# Patient Record
Sex: MALE | Race: WHITE | NOT HISPANIC OR LATINO
[De-identification: names, ages, dates, MRNs, and addresses within clinical notes are randomized per-mention and may not be internally consistent; named-entity substitution may affect disease eponyms.]

---

## 2017-02-13 ENCOUNTER — RX RENEWAL (OUTPATIENT)
Age: 67
End: 2017-02-13

## 2017-04-20 ENCOUNTER — APPOINTMENT (OUTPATIENT)
Dept: DERMATOLOGY | Facility: CLINIC | Age: 67
End: 2017-04-20

## 2017-07-11 ENCOUNTER — APPOINTMENT (OUTPATIENT)
Dept: INTERNAL MEDICINE | Facility: CLINIC | Age: 67
End: 2017-07-11

## 2017-07-11 ENCOUNTER — NON-APPOINTMENT (OUTPATIENT)
Age: 67
End: 2017-07-11

## 2017-07-11 VITALS
HEIGHT: 70 IN | BODY MASS INDEX: 22.9 KG/M2 | WEIGHT: 160 LBS | SYSTOLIC BLOOD PRESSURE: 108 MMHG | DIASTOLIC BLOOD PRESSURE: 60 MMHG | HEART RATE: 68 BPM | RESPIRATION RATE: 12 BRPM

## 2017-07-11 DIAGNOSIS — Z11.59 ENCOUNTER FOR SCREENING FOR OTHER VIRAL DISEASES: ICD-10-CM

## 2017-07-11 DIAGNOSIS — Z92.29 PERSONAL HISTORY OF OTHER DRUG THERAPY: ICD-10-CM

## 2017-07-13 LAB
ALBUMIN SERPL ELPH-MCNC: 4.3 G/DL
ALP BLD-CCNC: 61 U/L
ALT SERPL-CCNC: 20 U/L
ANION GAP SERPL CALC-SCNC: 16 MMOL/L
APPEARANCE: CLEAR
AST SERPL-CCNC: 21 U/L
BACTERIA: NEGATIVE
BASOPHILS # BLD AUTO: 0.02 K/UL
BASOPHILS NFR BLD AUTO: 0.4 %
BILIRUB SERPL-MCNC: 1 MG/DL
BILIRUBIN URINE: NEGATIVE
BLOOD URINE: NEGATIVE
BUN SERPL-MCNC: 25 MG/DL
CALCIUM SERPL-MCNC: 9.4 MG/DL
CHLORIDE SERPL-SCNC: 102 MMOL/L
CHOLEST SERPL-MCNC: 202 MG/DL
CHOLEST/HDLC SERPL: 2.3 RATIO
CO2 SERPL-SCNC: 24 MMOL/L
COLOR: YELLOW
CREAT SERPL-MCNC: 1.48 MG/DL
EOSINOPHIL # BLD AUTO: 0.08 K/UL
EOSINOPHIL NFR BLD AUTO: 1.4 %
GLUCOSE QUALITATIVE U: NORMAL MG/DL
GLUCOSE SERPL-MCNC: 103 MG/DL
HCT VFR BLD CALC: 41.4 %
HDLC SERPL-MCNC: 86 MG/DL
HGB BLD-MCNC: 14.2 G/DL
HYALINE CASTS: 1 /LPF
IMM GRANULOCYTES NFR BLD AUTO: 0.2 %
KETONES URINE: NEGATIVE
LDLC SERPL CALC-MCNC: 102 MG/DL
LEUKOCYTE ESTERASE URINE: NEGATIVE
LYMPHOCYTES # BLD AUTO: 1.29 K/UL
LYMPHOCYTES NFR BLD AUTO: 23.3 %
MAN DIFF?: NORMAL
MCHC RBC-ENTMCNC: 32.2 PG
MCHC RBC-ENTMCNC: 34.3 GM/DL
MCV RBC AUTO: 93.9 FL
MICROSCOPIC-UA: NORMAL
MONOCYTES # BLD AUTO: 0.6 K/UL
MONOCYTES NFR BLD AUTO: 10.8 %
NEUTROPHILS # BLD AUTO: 3.54 K/UL
NEUTROPHILS NFR BLD AUTO: 63.9 %
NITRITE URINE: NEGATIVE
PH URINE: 5
PLATELET # BLD AUTO: 281 K/UL
POTASSIUM SERPL-SCNC: 4.9 MMOL/L
PROT SERPL-MCNC: 6.6 G/DL
PROTEIN URINE: NEGATIVE MG/DL
RBC # BLD: 4.41 M/UL
RBC # FLD: 13 %
RED BLOOD CELLS URINE: 1 /HPF
RPR SER-TITR: NORMAL
SODIUM SERPL-SCNC: 142 MMOL/L
SPECIFIC GRAVITY URINE: 1.02
SQUAMOUS EPITHELIAL CELLS: 0 /HPF
TRIGL SERPL-MCNC: 69 MG/DL
TSH SERPL-ACNC: 1.29 UIU/ML
UROBILINOGEN URINE: NORMAL MG/DL
VIT B12 SERPL-MCNC: 665 PG/ML
WBC # FLD AUTO: 5.54 K/UL
WHITE BLOOD CELLS URINE: 0 /HPF

## 2017-07-20 ENCOUNTER — RX RENEWAL (OUTPATIENT)
Age: 67
End: 2017-07-20

## 2017-11-13 ENCOUNTER — APPOINTMENT (OUTPATIENT)
Dept: DERMATOLOGY | Facility: CLINIC | Age: 67
End: 2017-11-13
Payer: MEDICARE

## 2017-11-13 PROCEDURE — 17003 DESTRUCT PREMALG LES 2-14: CPT

## 2017-11-13 PROCEDURE — 99214 OFFICE O/P EST MOD 30 MIN: CPT | Mod: 25

## 2017-11-13 PROCEDURE — 17000 DESTRUCT PREMALG LESION: CPT

## 2018-01-10 ENCOUNTER — APPOINTMENT (OUTPATIENT)
Dept: INTERNAL MEDICINE | Facility: CLINIC | Age: 68
End: 2018-01-10
Payer: MEDICARE

## 2018-01-10 VITALS
OXYGEN SATURATION: 98 % | BODY MASS INDEX: 23.19 KG/M2 | DIASTOLIC BLOOD PRESSURE: 80 MMHG | HEIGHT: 70 IN | HEART RATE: 68 BPM | SYSTOLIC BLOOD PRESSURE: 120 MMHG | WEIGHT: 162 LBS

## 2018-01-10 PROCEDURE — 36415 COLL VENOUS BLD VENIPUNCTURE: CPT

## 2018-01-10 PROCEDURE — 99214 OFFICE O/P EST MOD 30 MIN: CPT | Mod: 25

## 2018-01-11 ENCOUNTER — RESULT REVIEW (OUTPATIENT)
Age: 68
End: 2018-01-11

## 2018-01-11 LAB
ALBUMIN SERPL ELPH-MCNC: 4.4 G/DL
ALP BLD-CCNC: 56 U/L
ALT SERPL-CCNC: 21 U/L
ANION GAP SERPL CALC-SCNC: 13 MMOL/L
AST SERPL-CCNC: 20 U/L
BASOPHILS # BLD AUTO: 0.03 K/UL
BASOPHILS NFR BLD AUTO: 0.5 %
BILIRUB SERPL-MCNC: 1 MG/DL
BUN SERPL-MCNC: 20 MG/DL
CALCIUM SERPL-MCNC: 9.4 MG/DL
CHLORIDE SERPL-SCNC: 100 MMOL/L
CHOLEST SERPL-MCNC: 215 MG/DL
CHOLEST/HDLC SERPL: 2.7 RATIO
CO2 SERPL-SCNC: 29 MMOL/L
CREAT SERPL-MCNC: 1.29 MG/DL
EOSINOPHIL # BLD AUTO: 0.06 K/UL
EOSINOPHIL NFR BLD AUTO: 1 %
GLUCOSE SERPL-MCNC: 93 MG/DL
HBA1C MFR BLD HPLC: 5.2 %
HCT VFR BLD CALC: 43.7 %
HDLC SERPL-MCNC: 80 MG/DL
HGB BLD-MCNC: 15.5 G/DL
IMM GRANULOCYTES NFR BLD AUTO: 0.2 %
LDLC SERPL CALC-MCNC: 116 MG/DL
LYMPHOCYTES # BLD AUTO: 1.36 K/UL
LYMPHOCYTES NFR BLD AUTO: 23.4 %
MAN DIFF?: NORMAL
MCHC RBC-ENTMCNC: 32.7 PG
MCHC RBC-ENTMCNC: 35.5 GM/DL
MCV RBC AUTO: 92.2 FL
MONOCYTES # BLD AUTO: 0.51 K/UL
MONOCYTES NFR BLD AUTO: 8.8 %
NEUTROPHILS # BLD AUTO: 3.85 K/UL
NEUTROPHILS NFR BLD AUTO: 66.1 %
PLATELET # BLD AUTO: 281 K/UL
POTASSIUM SERPL-SCNC: 4.4 MMOL/L
PROT SERPL-MCNC: 6.8 G/DL
RBC # BLD: 4.74 M/UL
RBC # FLD: 12.4 %
SODIUM SERPL-SCNC: 142 MMOL/L
TRIGL SERPL-MCNC: 94 MG/DL
WBC # FLD AUTO: 5.82 K/UL

## 2018-01-17 ENCOUNTER — RESULT REVIEW (OUTPATIENT)
Age: 68
End: 2018-01-17

## 2018-01-17 LAB
BSA DERIVED: 1.73 M2
CREAT 24H UR-MCNC: 1.4 G/24 H
CREAT 24H UR-MCNC: 1.4 G/24 H
CREAT ?TM UR-MCNC: 77 MG/DL
CREAT ?TM UR-MCNC: 77 MG/DL
CREAT CL 24H UR+SERPL-VRATE: 67 ML/MIN
CREAT SERPL-MCNC: 1.41 MG/DL
PROT 24H UR-MRATE: <4 MG/DL
PROT ?TM UR-MCNC: 24 HR
PROT ?TM UR-MCNC: 24 HR
PROT UR-MCNC: <71 MG/24 H
SPECIMEN VOL 24H UR: 1775 ML
SPECIMEN VOL 24H UR: 1775 ML

## 2018-06-15 ENCOUNTER — RX RENEWAL (OUTPATIENT)
Age: 68
End: 2018-06-15

## 2018-07-27 ENCOUNTER — TRANSCRIPTION ENCOUNTER (OUTPATIENT)
Age: 68
End: 2018-07-27

## 2018-07-27 ENCOUNTER — INPATIENT (INPATIENT)
Facility: HOSPITAL | Age: 68
LOS: 0 days | Discharge: HOME | DRG: 287 | End: 2018-07-27
Attending: INTERNAL MEDICINE | Admitting: INTERNAL MEDICINE
Payer: COMMERCIAL

## 2018-07-27 VITALS
TEMPERATURE: 99 F | RESPIRATION RATE: 18 BRPM | OXYGEN SATURATION: 97 % | SYSTOLIC BLOOD PRESSURE: 162 MMHG | HEART RATE: 105 BPM | DIASTOLIC BLOOD PRESSURE: 100 MMHG

## 2018-07-27 VITALS
DIASTOLIC BLOOD PRESSURE: 63 MMHG | SYSTOLIC BLOOD PRESSURE: 128 MMHG | OXYGEN SATURATION: 100 % | RESPIRATION RATE: 18 BRPM | HEART RATE: 63 BPM

## 2018-07-27 DIAGNOSIS — R07.9 CHEST PAIN, UNSPECIFIED: ICD-10-CM

## 2018-07-27 LAB
ALBUMIN SERPL ELPH-MCNC: 4.2 G/DL — SIGNIFICANT CHANGE UP (ref 3.3–5.2)
ALP SERPL-CCNC: 51 U/L — SIGNIFICANT CHANGE UP (ref 40–120)
ALT FLD-CCNC: 18 U/L — SIGNIFICANT CHANGE UP
ANION GAP SERPL CALC-SCNC: 12 MMOL/L — SIGNIFICANT CHANGE UP (ref 5–17)
APTT BLD: 30.4 SEC — SIGNIFICANT CHANGE UP (ref 27.5–37.4)
AST SERPL-CCNC: 21 U/L — SIGNIFICANT CHANGE UP
BASOPHILS # BLD AUTO: 0 K/UL — SIGNIFICANT CHANGE UP (ref 0–0.2)
BASOPHILS NFR BLD AUTO: 0.4 % — SIGNIFICANT CHANGE UP (ref 0–2)
BILIRUB SERPL-MCNC: 0.8 MG/DL — SIGNIFICANT CHANGE UP (ref 0.4–2)
BUN SERPL-MCNC: 20 MG/DL — SIGNIFICANT CHANGE UP (ref 8–20)
CALCIUM SERPL-MCNC: 9.1 MG/DL — SIGNIFICANT CHANGE UP (ref 8.6–10.2)
CHLORIDE SERPL-SCNC: 107 MMOL/L — SIGNIFICANT CHANGE UP (ref 98–107)
CO2 SERPL-SCNC: 23 MMOL/L — SIGNIFICANT CHANGE UP (ref 22–29)
CREAT SERPL-MCNC: 1.24 MG/DL — SIGNIFICANT CHANGE UP (ref 0.5–1.3)
EOSINOPHIL # BLD AUTO: 0 K/UL — SIGNIFICANT CHANGE UP (ref 0–0.5)
EOSINOPHIL NFR BLD AUTO: 0.7 % — SIGNIFICANT CHANGE UP (ref 0–5)
GLUCOSE SERPL-MCNC: 94 MG/DL — SIGNIFICANT CHANGE UP (ref 70–115)
HCT VFR BLD CALC: 42.2 % — SIGNIFICANT CHANGE UP (ref 42–52)
HGB BLD-MCNC: 14.3 G/DL — SIGNIFICANT CHANGE UP (ref 14–18)
INR BLD: 0.99 RATIO — SIGNIFICANT CHANGE UP (ref 0.88–1.16)
LYMPHOCYTES # BLD AUTO: 1 K/UL — SIGNIFICANT CHANGE UP (ref 1–4.8)
LYMPHOCYTES # BLD AUTO: 17.8 % — LOW (ref 20–55)
MCHC RBC-ENTMCNC: 32.1 PG — HIGH (ref 27–31)
MCHC RBC-ENTMCNC: 33.9 G/DL — SIGNIFICANT CHANGE UP (ref 32–36)
MCV RBC AUTO: 94.8 FL — HIGH (ref 80–94)
MONOCYTES # BLD AUTO: 0.6 K/UL — SIGNIFICANT CHANGE UP (ref 0–0.8)
MONOCYTES NFR BLD AUTO: 11.4 % — HIGH (ref 3–10)
NEUTROPHILS # BLD AUTO: 3.7 K/UL — SIGNIFICANT CHANGE UP (ref 1.8–8)
NEUTROPHILS NFR BLD AUTO: 69.5 % — SIGNIFICANT CHANGE UP (ref 37–73)
NT-PROBNP SERPL-SCNC: 102 PG/ML — SIGNIFICANT CHANGE UP (ref 0–300)
PLATELET # BLD AUTO: 265 K/UL — SIGNIFICANT CHANGE UP (ref 150–400)
POTASSIUM SERPL-MCNC: 4.4 MMOL/L — SIGNIFICANT CHANGE UP (ref 3.5–5.3)
POTASSIUM SERPL-SCNC: 4.4 MMOL/L — SIGNIFICANT CHANGE UP (ref 3.5–5.3)
PROT SERPL-MCNC: 6.5 G/DL — LOW (ref 6.6–8.7)
PROTHROM AB SERPL-ACNC: 10.9 SEC — SIGNIFICANT CHANGE UP (ref 9.8–12.7)
RBC # BLD: 4.45 M/UL — LOW (ref 4.6–6.2)
RBC # FLD: 12.6 % — SIGNIFICANT CHANGE UP (ref 11–15.6)
SODIUM SERPL-SCNC: 142 MMOL/L — SIGNIFICANT CHANGE UP (ref 135–145)
TROPONIN T SERPL-MCNC: <0.01 NG/ML — SIGNIFICANT CHANGE UP (ref 0–0.06)
WBC # BLD: 5.3 K/UL — SIGNIFICANT CHANGE UP (ref 4.8–10.8)
WBC # FLD AUTO: 5.3 K/UL — SIGNIFICANT CHANGE UP (ref 4.8–10.8)

## 2018-07-27 PROCEDURE — 71045 X-RAY EXAM CHEST 1 VIEW: CPT | Mod: 26

## 2018-07-27 PROCEDURE — 93010 ELECTROCARDIOGRAM REPORT: CPT

## 2018-07-27 PROCEDURE — 99152 MOD SED SAME PHYS/QHP 5/>YRS: CPT

## 2018-07-27 PROCEDURE — 99285 EMERGENCY DEPT VISIT HI MDM: CPT

## 2018-07-27 PROCEDURE — 93458 L HRT ARTERY/VENTRICLE ANGIO: CPT | Mod: 26

## 2018-07-27 PROCEDURE — 99284 EMERGENCY DEPT VISIT MOD MDM: CPT

## 2018-07-27 RX ORDER — RAMIPRIL 5 MG
40 CAPSULE ORAL
Qty: 0 | Refills: 0 | COMMUNITY

## 2018-07-27 RX ORDER — ASPIRIN/CALCIUM CARB/MAGNESIUM 324 MG
324 TABLET ORAL ONCE
Qty: 0 | Refills: 0 | Status: COMPLETED | OUTPATIENT
Start: 2018-07-27 | End: 2018-07-27

## 2018-07-27 RX ORDER — ATORVASTATIN CALCIUM 80 MG/1
80 TABLET, FILM COATED ORAL ONCE
Qty: 0 | Refills: 0 | Status: COMPLETED | OUTPATIENT
Start: 2018-07-27 | End: 2018-07-27

## 2018-07-27 RX ADMIN — ATORVASTATIN CALCIUM 80 MILLIGRAM(S): 80 TABLET, FILM COATED ORAL at 12:45

## 2018-07-27 RX ADMIN — Medication 324 MILLIGRAM(S): at 11:29

## 2018-07-27 NOTE — CONSULT NOTE ADULT - ASSESSMENT
68 yo male with HTN, new onset of CP, angina with exertion and today chest pain with minimal activity. Currently CP free.  We will check labs and CXR  He received aspirin already.   Plan for cardiac cath today. Spoke with Dr. Alonzo who will perform the procedure.  Lipitor 80mg x1 now.

## 2018-07-27 NOTE — DISCHARGE NOTE ADULT - PATIENT PORTAL LINK FT
You can access the [facility] Patient Portal, offered by [facility], by registering with the following website: [email or web address]

## 2018-07-27 NOTE — DISCHARGE NOTE ADULT - MEDICATION SUMMARY - MEDICATIONS TO TAKE
I will START or STAY ON the medications listed below when I get home from the hospital:    quinapril 40 mg oral tablet  -- 1 tab(s) by mouth once a day  -- Indication: For HTN (hypertension)

## 2018-07-27 NOTE — DISCHARGE NOTE ADULT - CARE PLAN
Principal Discharge DX:	Chest pain, unspecified type  Goal:	Maintain Cardiac Health  Assessment and plan of treatment:	Continue present medications follow up with Cardiologist in 2 weeks   Restricted use with no heavy lifting of affected arm for 48 hours.  No submerging the arm in water for 48 hours.  You may start showering today.  Call your doctor for any bleeding, swelling, loss of sensation in the hand or fingers, or fingers turning blue.  If heavy bleeding or large lumps form, hold pressure at the spot and come to the Emergency Room.

## 2018-07-27 NOTE — ED ADULT NURSE NOTE - OBJECTIVE STATEMENT
Patient stating chest pressure that started yesterday, intermittent, had 1 episode last night which lasted 15 minutes but resolved. Last episode was this morning, mid-sternal and non-radiating. Denies any SOB. Family at bedside, to go up to cathlab

## 2018-07-27 NOTE — CONSULT NOTE ADULT - SUBJECTIVE AND OBJECTIVE BOX
Patient is a 67y old  Male who presents with a chief complaint of chest discomfort.   Chris is well known to me. He has a history of hypertension and has a strong family history for premature CAD.  He exercises a few times per week. He developed epigastric discomfort at peak exercise. After resting 2 minutes symptoms resolved. There was no other associated symptoms or radiation of the pain. No lightheadeness, syncope or presyncope. He has never had such a pain before.   He walked to his car this am and felt slight discomfort again, went to gym and decided not to exercise. Currently CP free.     PAST MEDICAL & SURGICAL HISTORY:  As above.     Allergies:  No Known Allergies    MEDICATIONS  (STANDING):  Accupril    FAMILY HISTORY:  Father with MI at age 50.    SOCIAL HISTORY:  no tobacco or drug use. occasional etoh use.     REVIEW OF SYSTEMS:  CONSTITUTIONAL: No fever, weight loss, or fatigue  EYES: No eye pain, visual disturbances, or discharge  ENMT:  No difficulty hearing, tinnitus, vertigo; No sinus or throat pain  NECK: No pain or stiffness  RESPIRATORY: No cough, wheezing, chills or hemoptysis; No Shortness of Breath  CARDIOVASCULAR: See HPI.  GASTROINTESTINAL: No abdominal or epigastric pain. No nausea, vomiting, or hematemesis; No diarrhea or constipation. No melena or hematochezia.  GENITOURINARY: No dysuria, frequency, hematuria, or incontinence  NEUROLOGICAL: No headaches, memory loss, loss of strength, numbness, or tremors  SKIN: No itching, burning, rashes, or lesions   LYMPH Nodes: No enlarged glands  ENDOCRINE: No heat or cold intolerance; No hair loss  MUSCULOSKELETAL: No joint pain or swelling; No muscle, back, or extremity pain  PSYCHIATRIC: No depression, anxiety, mood swings, or difficulty sleeping  HEME/LYMPH: No easy bruising, or bleeding gums  ALLERY AND IMMUNOLOGIC: No hives or eczema	    Vital Signs Last 24 Hrs  T(C): 37.4 (27 Jul 2018 11:06), Max: 37.4 (27 Jul 2018 11:06)  T(F): 99.3 (27 Jul 2018 11:06), Max: 99.3 (27 Jul 2018 11:06)  HR: 76 (27 Jul 2018 11:06) (76 - 76)  BP: 126/73 (27 Jul 2018 11:06) (126/73 - 126/73)  RR: 20 (27 Jul 2018 11:06) (20 - 20)  SpO2: 100% (27 Jul 2018 11:06) (100% - 100%)    PHYSICAL EXAM:  Appearance: Normal, well nourished	  HEENT:   Normal oral mucosa, PERRL, EOMI, sclera non-icteric	  Lymphatic: No cervical lymphadenopathy  Cardiovascular: Normal S1 S2, No JVD, No cardiac murmurs, No carotid bruits, No peripheral edema  Respiratory: Lungs clear to auscultation	  Psychiatry: A & O x 3, Mood & affect appropriate  Gastrointestinal:  Soft, Non-tender, + BS, no bruits	  Skin: No rashes, No ecchymoses, No cyanosis  Neurologic: Grossly non-focal with full strength in all four extremities  Extremities: Normal range of motion, No clubbing, cyanosis or edema  Vascular: Peripheral pulses palpable 2+ bilaterally    ECG: NSR, 68 BPM, 1st degree AV block, no st/t changes     LABS: pending    CXR: Pending

## 2018-07-27 NOTE — ED PROVIDER NOTE - OBJECTIVE STATEMENT
68 y/o M with PMHx of HTN presents to ED c/o chest pressure onset yesterday. Notes pressure is intermittent, had 1 episode last night which lasted 15 minutes but resolved. Last episode was this morning, mid-sternal and non-radiating, with no associated symptoms. Denies fever. No diaphoresis. Denies HA or neck pain. No cough or sob. No abdominal pain, N/V/D or constipation. No urinary f/u/d. No back pain. No head trauma, LOC, visual changes, motor or sensory deficits. Denies recent travel or sick contacts. Family hx of early cardiac death, father had MI at 58. No further acute complaints at this time.    Cardiologist: Dr. Castro

## 2018-07-27 NOTE — DISCHARGE NOTE ADULT - HOSPITAL COURSE
67 y old  Male who presents with a chief complaint of chest discomfort. PMH includes hypertension and has a strong family history for premature CAD.  He exercises a few times per week. He developed chest pressure with exertion and exercise for several  minutes two times . Second incident associated with  radiation down left arm . Called his Cardiologist Dr Barrow  was recommend to go to  ER . Pt took asa at home was evaluated  in ED by Dr Castor and sent  to Cardiac cath   Currently pain free     First troponin negative (27 Jul 2018 15:40)  s/p Cardiac cath via RRA   angina class III s/p cath nonobstructive disease   Chest  x ray reported as normal by Radiologist   Reviewed  with Dr Castro and Dr Alonzo    continue present medications   -VS, labs, diet, activity as per post cath orders  -Encourage PO fluids  -Plan of care D/W pt. and MD  -Post cath instructions reviewed with pt., pt. verbalizes and understands instructions  -Follow-up with attending

## 2018-07-27 NOTE — ED STATDOCS - PROGRESS NOTE DETAILS
68 yo male pmashwini strauss comes to ed with chest pain on exertion while at the gym x 2 days; pt seen by Dr Castro of Cardiology for evaluation of chest pain ; pt to be scheduled for cardiac cath by Dr Alonzo

## 2018-07-27 NOTE — PROGRESS NOTE ADULT - SUBJECTIVE AND OBJECTIVE BOX
Nurse Practitioner Progress note:     HPI:  67 y old  Male who presents with a chief complaint of chest discomfort. PMH includes hypertension and has a strong family history for premature CAD.  He exercises a few times per week. He developed chest pressure with exertion and exercise for several  minutes two times . Second incident associated with  radiation down left arm . Called his Cardiologist Dr Barrow  was recommend to go to  ER . Pt took asa at home was evaluated  in ED by Dr Castro and sent  to Cardiac cath   Currently pain free     First troponin negative (27 Jul 2018 15:40)  s/p Cardiac cath via RRA           T(C): 36.7 (07-27-18 @ 12:48), Max: 37.4 (07-27-18 @ 11:06)  HR: 60 (07-27-18 @ 14:20) (60 - 76)  BP: 148/71 (07-27-18 @ 15:15) (116/55 - 148/71)  RR: 16 (07-27-18 @ 15:15) (16 - 20)  SpO2: 100% (07-27-18 @ 15:15) (100% - 100%)      PHYSICAL EXAM:  Neurologic: Non-focal, AxOx3.  No neuro deficits  Vascular: Peripheral pulses palpable 2+ bilaterally  Procedure Site: RRA band removed fingers warm and mobile good cap refill       PROCEDURE RESULTS:  s/p LHC   normal cors with EF 60 %     ASSESSMENT/PLAN:   angina class III s/p cath nonobstructive disease   Reviewed  with Dr Castro and Dr ramirez   continue present medications 	  -VS, labs, diet, activity as per post cath orders  -Encourage PO fluids  -Plan of care D/W pt. and MD  -Post cath instructions reviewed with pt., pt. verbalizes and understands instructions  -Follow-up with attending   -Ambulate after bedrest   - Discharge  home if stable vital and wrist

## 2018-07-27 NOTE — H&P PST ADULT - PROBLEM SELECTOR PLAN 1
PRE-PROCEDURE ASSESSMENT  [facility]   -Patient seen and examined  -Labs reviewed  -Pre-procedure teaching completed with patient and family  -Informed consent witnessed  -Questions answered

## 2018-07-27 NOTE — H&P PST ADULT - PROBLEM SELECTOR PLAN 1
PRE-PROCEDURE ASSESSMENT  [facility]   -Patient seen and examined  -Labs reviewed  -Pre-procedure teaching completed with patient and family  -Informed consent witnessed  -Questions answered

## 2018-07-27 NOTE — ED PROVIDER NOTE - MEDICAL DECISION MAKING DETAILS
Concern for ACS, contacted Dr. Castro (patient's cardiologist), who has not seen patient in office in over 4 years. Will admit for catheterization procedure today. Concern for ACS, contacted Dr. Castro (patient's cardiologist),. Will admit for catheterization procedure today.

## 2018-07-27 NOTE — H&P PST ADULT - DOES THIS PATIENT HAVE A HISTORY OF OR HAS BEEN DX WITH HEART FAILURE?
Patient needs to schedule nurse only visit for mantoux.  Instructions for scheduling appointment were left for daughter DimpleAlina Bower CMA February 1, 2017 1:10 PM    
Reason for Call:  Other call back    Detailed comments: PT daughter called and wants to know if pt has had TB test    Phone Number Patient can be reached at: Other phone number:  [phone]    Best Time: anytime    Can we leave a detailed message on this number? YES    Call taken on 2/1/2017 at 11:45 AM by Heena Ariza      
Schedule nurse only for PPD today or next week  Pt will need to return in 2 days after ppd placed to have this read by one of the nurses so can't come in tomorrow.  
Tana,     Patient needs to have TB test to move into senior living community.  Please advise.     Thank you,   LINNEA Bower CMA February 1, 2017 12:19 PM    
no
no

## 2018-07-27 NOTE — DISCHARGE NOTE ADULT - CARE PROVIDER_API CALL
Luis Alberto Castro), Cardiovascular Disease  [address] [place] [address]  Phone: [phone]  Fax: [phone]

## 2018-07-27 NOTE — H&P PST ADULT - ASSESSMENT
Angina class 3 with exertion and concerning FH and HTn   presents to CCL
Angina class 3 with exertion and concerning FH and HTn   presents to CCL

## 2018-08-05 PROCEDURE — 93005 ELECTROCARDIOGRAM TRACING: CPT

## 2018-08-05 PROCEDURE — 71045 X-RAY EXAM CHEST 1 VIEW: CPT

## 2018-08-05 PROCEDURE — C1769: CPT

## 2018-08-05 PROCEDURE — 82550 ASSAY OF CK (CPK): CPT

## 2018-08-05 PROCEDURE — 85730 THROMBOPLASTIN TIME PARTIAL: CPT

## 2018-08-05 PROCEDURE — C1894: CPT

## 2018-08-05 PROCEDURE — 99152 MOD SED SAME PHYS/QHP 5/>YRS: CPT

## 2018-08-05 PROCEDURE — 84484 ASSAY OF TROPONIN QUANT: CPT

## 2018-08-05 PROCEDURE — 80053 COMPREHEN METABOLIC PANEL: CPT

## 2018-08-05 PROCEDURE — 85610 PROTHROMBIN TIME: CPT

## 2018-08-05 PROCEDURE — 99285 EMERGENCY DEPT VISIT HI MDM: CPT

## 2018-08-05 PROCEDURE — C1887: CPT

## 2018-08-05 PROCEDURE — 36415 COLL VENOUS BLD VENIPUNCTURE: CPT

## 2018-08-05 PROCEDURE — 83880 ASSAY OF NATRIURETIC PEPTIDE: CPT

## 2018-08-05 PROCEDURE — 93458 L HRT ARTERY/VENTRICLE ANGIO: CPT

## 2018-08-05 PROCEDURE — 85027 COMPLETE CBC AUTOMATED: CPT

## 2018-08-07 ENCOUNTER — APPOINTMENT (OUTPATIENT)
Dept: INTERNAL MEDICINE | Facility: CLINIC | Age: 68
End: 2018-08-07
Payer: MEDICARE

## 2018-08-07 VITALS
HEART RATE: 70 BPM | DIASTOLIC BLOOD PRESSURE: 80 MMHG | RESPIRATION RATE: 11 BRPM | HEIGHT: 70 IN | SYSTOLIC BLOOD PRESSURE: 125 MMHG | BODY MASS INDEX: 22.48 KG/M2 | WEIGHT: 157 LBS

## 2018-08-07 DIAGNOSIS — Z85.828 PERSONAL HISTORY OF OTHER MALIGNANT NEOPLASM OF SKIN: ICD-10-CM

## 2018-08-07 PROBLEM — I10 ESSENTIAL (PRIMARY) HYPERTENSION: Chronic | Status: ACTIVE | Noted: 2018-07-27

## 2018-08-07 PROCEDURE — G0438: CPT

## 2018-08-07 PROCEDURE — 36415 COLL VENOUS BLD VENIPUNCTURE: CPT

## 2018-08-07 NOTE — ASSESSMENT
[FreeTextEntry1] : 67-year-old male currently medically stable with controlled hypertension on present medications without any indication of active medical issues.\par \par The patient is status post substernal chest pain with cardiac catheterization improving he has no CAD.\par \par Patient is to continue present medications and supplements along with his good diet and exercise.\par \par Colonoscopy August 2016 with followup in 5 years\par Urology followup as scheduled\par \par Prevnar vaccine and shingles vaccine discussed. Patient again declines\par \par Followup in 6 months

## 2018-08-07 NOTE — HEALTH RISK ASSESSMENT
[Good] : ~his/her~ current health as good [Very Good] : ~his/her~  mood as very good [No falls in past year] : Patient reported no falls in the past year [0] : 2) Feeling down, depressed, or hopeless: Not at all (0) [None] : None [With Significant Other] : lives with significant other [# of Members in Household ___] :  household currently consist of [unfilled] member(s) [Retired] : retired [College] : College [] :  [# Of Children ___] : has [unfilled] children [Sexually Active] : sexually active [Feels Safe at Home] : Feels safe at home [Fully functional (bathing, dressing, toileting, transferring, walking, feeding)] : Fully functional (bathing, dressing, toileting, transferring, walking, feeding) [Fully functional (using the telephone, shopping, preparing meals, housekeeping, doing laundry, using] : Fully functional and needs no help or supervision to perform IADLs (using the telephone, shopping, preparing meals, housekeeping, doing laundry, using transportation, managing medications and managing finances) [Smoke Detector] : smoke detector [Carbon Monoxide Detector] : carbon monoxide detector [Seat Belt] :  uses seat belt [Discussed at today's visit] : Advance Directives Discussed at today's visit [] : No [de-identified] : occ [[id number]] : 0 [Change in mental status noted] : No change in mental status noted [Reports changes in hearing] : Reports no changes in hearing [Reports changes in vision] : Reports no changes in vision [Reports changes in dental health] : Reports no changes in dental health [Guns at Home] : no guns at home [Sunscreen] : does not use sunscreen [de-identified] : recommended sunscreen

## 2018-08-07 NOTE — PHYSICAL EXAM
[General Appearance - Alert] : alert [General Appearance - In No Acute Distress] : in no acute distress [Sclera] : the sclera and conjunctiva were normal [PERRL With Normal Accommodation] : pupils were equal in size, round, and reactive to light [Extraocular Movements] : extraocular movements were intact [Outer Ear] : the ears and nose were normal in appearance [Oropharynx] : the oropharynx was normal [Neck Appearance] : the appearance of the neck was normal [Neck Cervical Mass (___cm)] : no neck mass was observed [Jugular Venous Distention Increased] : there was no jugular-venous distention [Thyroid Diffuse Enlargement] : the thyroid was not enlarged [Thyroid Nodule] : there were no palpable thyroid nodules [Auscultation Breath Sounds / Voice Sounds] : lungs were clear to auscultation bilaterally [Heart Rate And Rhythm] : heart rate was normal and rhythm regular [Heart Sounds] : normal S1 and S2 [Heart Sounds Gallop] : no gallops [Murmurs] : no murmurs [Heart Sounds Pericardial Friction Rub] : no pericardial rub [Arterial Pulses Carotid] : carotid pulses were normal with no bruits [Abdominal Aorta] : the abdominal aorta was normal [Arterial Pulses Femoral] : femoral pulses were normal without bruits [Full Pulse] : the pedal pulses are present [Edema] : there was no peripheral edema [Veins - Varicosity Changes] : there were no varicosital changes [Bowel Sounds] : normal bowel sounds [Abdomen Soft] : soft [Abdomen Tenderness] : non-tender [Abdomen Mass (___ Cm)] : no abdominal mass palpated [Cervical Lymph Nodes Enlarged Posterior Bilaterally] : posterior cervical [Cervical Lymph Nodes Enlarged Anterior Bilaterally] : anterior cervical [Supraclavicular Lymph Nodes Enlarged Bilaterally] : supraclavicular [Axillary Lymph Nodes Enlarged Bilaterally] : axillary [Femoral Lymph Nodes Enlarged Bilaterally] : femoral [Inguinal Lymph Nodes Enlarged Bilaterally] : inguinal [No Spinal Tenderness] : no spinal tenderness [Abnormal Walk] : normal gait [Nail Clubbing] : no clubbing  or cyanosis of the fingernails [Musculoskeletal - Swelling] : no joint swelling seen [Motor Tone] : muscle strength and tone were normal [Skin Color & Pigmentation] : normal skin color and pigmentation [Skin Turgor] : normal skin turgor [] : no rash [Cranial Nerves] : cranial nerves 2-12 were intact [No Focal Deficits] : no focal deficits [Oriented To Time, Place, And Person] : oriented to person, place, and time [Impaired Insight] : insight and judgment were intact [Affect] : the affect was normal [FreeTextEntry1] : By urology [Over the Past 2 Weeks, Have You Felt Down, Depressed, or Hopeless?] : 1.) Over the past 2 weeks, have you felt down, depressed, or hopeless? No [Over the Past 2 Weeks, Have You Felt Little Interest or Pleasure Doing Things?] : 2.) Over the past 2 weeks, have you felt little interest or pleasure doing things? No

## 2018-08-07 NOTE — COUNSELING
[None] : None [Good understanding] : Patient has a good understanding of lifestyle changes and the steps needed to achieve self management goals [de-identified] : continue diet and exercise

## 2018-08-07 NOTE — HISTORY OF PRESENT ILLNESS
[FreeTextEntry1] : 67-year-old male whose only chronic medical issues hypertension for which he takes Quinipril and BPH with erectile dysfunction for which he follows with urologist Dr. Santiago presents for his yearly physical.\par \par The patient does run a mildly elevated creatinine with 24-hour urine creatinine clearance January 2018= 67 mL/minute.\par \par The patient did have an episode about one week ago while he was in the gym he had substernal chest pain and pressure that lasted about 15 minutes while he was doing physical activity.\par The symptoms resolved on the ROM.\par No associated symptoms including radiation of pain, diaphoresis, nausea, dizziness, or syncope.\par The patient went home and now the direction of his wife went to the ED and was admitted and seen by cardiology.\par Cardiac enzyme was negative.\par Cardiac catheterization was normal with no CAD.\par The patient has since felt fine including back at the gym with no recurrence of his symptoms.\par \par He follows a good diet and exercises regularly and his weight is stable.\par \par At last years physical the patient reported his wife felt his memory was decreased but the patient did not feel so and feels this is elective issue. He feels no change in has been no worsening of his memory.\par

## 2018-08-08 LAB
ALBUMIN SERPL ELPH-MCNC: 4.7 G/DL
ALP BLD-CCNC: 57 U/L
ALT SERPL-CCNC: 20 U/L
ANION GAP SERPL CALC-SCNC: 12 MMOL/L
APPEARANCE: CLEAR
AST SERPL-CCNC: 21 U/L
BACTERIA: NEGATIVE
BASOPHILS # BLD AUTO: 0.04 K/UL
BASOPHILS NFR BLD AUTO: 0.5 %
BILIRUB SERPL-MCNC: 0.7 MG/DL
BILIRUBIN URINE: NEGATIVE
BLOOD URINE: ABNORMAL
BUN SERPL-MCNC: 28 MG/DL
CALCIUM SERPL-MCNC: 9.8 MG/DL
CHLORIDE SERPL-SCNC: 104 MMOL/L
CHOLEST SERPL-MCNC: 212 MG/DL
CHOLEST/HDLC SERPL: 2.2 RATIO
CO2 SERPL-SCNC: 24 MMOL/L
COLOR: YELLOW
CREAT SERPL-MCNC: 1.42 MG/DL
EOSINOPHIL # BLD AUTO: 0.05 K/UL
EOSINOPHIL NFR BLD AUTO: 0.7 %
GLUCOSE QUALITATIVE U: NEGATIVE MG/DL
GLUCOSE SERPL-MCNC: 89 MG/DL
HCT VFR BLD CALC: 44.6 %
HDLC SERPL-MCNC: 98 MG/DL
HGB BLD-MCNC: 14.9 G/DL
HYALINE CASTS: 0 /LPF
IMM GRANULOCYTES NFR BLD AUTO: 0 %
KETONES URINE: NEGATIVE
LDLC SERPL CALC-MCNC: 103 MG/DL
LEUKOCYTE ESTERASE URINE: NEGATIVE
LYMPHOCYTES # BLD AUTO: 1.47 K/UL
LYMPHOCYTES NFR BLD AUTO: 19.3 %
MAN DIFF?: NORMAL
MCHC RBC-ENTMCNC: 32.5 PG
MCHC RBC-ENTMCNC: 33.4 GM/DL
MCV RBC AUTO: 97.2 FL
MICROSCOPIC-UA: NORMAL
MONOCYTES # BLD AUTO: 0.66 K/UL
MONOCYTES NFR BLD AUTO: 8.7 %
NEUTROPHILS # BLD AUTO: 5.39 K/UL
NEUTROPHILS NFR BLD AUTO: 70.8 %
NITRITE URINE: NEGATIVE
PH URINE: 5
PLATELET # BLD AUTO: 321 K/UL
POTASSIUM SERPL-SCNC: 4.8 MMOL/L
PROT SERPL-MCNC: 7 G/DL
PROTEIN URINE: NEGATIVE MG/DL
RBC # BLD: 4.59 M/UL
RBC # FLD: 12.9 %
RED BLOOD CELLS URINE: 1 /HPF
SODIUM SERPL-SCNC: 140 MMOL/L
SPECIFIC GRAVITY URINE: 1.02
SQUAMOUS EPITHELIAL CELLS: 0 /HPF
TRIGL SERPL-MCNC: 57 MG/DL
UROBILINOGEN URINE: NEGATIVE MG/DL
WBC # FLD AUTO: 7.61 K/UL
WHITE BLOOD CELLS URINE: 0 /HPF

## 2018-08-09 ENCOUNTER — RESULT REVIEW (OUTPATIENT)
Age: 68
End: 2018-08-09

## 2018-11-13 ENCOUNTER — APPOINTMENT (OUTPATIENT)
Dept: DERMATOLOGY | Facility: CLINIC | Age: 68
End: 2018-11-13
Payer: MEDICARE

## 2018-11-13 PROCEDURE — 17000 DESTRUCT PREMALG LESION: CPT

## 2018-11-13 PROCEDURE — 99213 OFFICE O/P EST LOW 20 MIN: CPT | Mod: 25

## 2018-12-04 ENCOUNTER — RX RENEWAL (OUTPATIENT)
Age: 68
End: 2018-12-04

## 2019-02-05 ENCOUNTER — APPOINTMENT (OUTPATIENT)
Dept: INTERNAL MEDICINE | Facility: CLINIC | Age: 69
End: 2019-02-05

## 2019-02-05 DIAGNOSIS — R73.01 IMPAIRED FASTING GLUCOSE: ICD-10-CM

## 2019-02-18 NOTE — ED PROVIDER NOTE - HISTORY ATTESTATION, MLM
Last 5 Patient Entered Readings                                      Current 30 Day Average: 134/81     Recent Readings 2/11/2019 2/10/2019 2/7/2019 2/6/2019 2/5/2019    SBP (mmHg) 142 138 131 135 139    DBP (mmHg) 85 77 84 78 81    Pulse 87 92 83 83 85          HPI:  Called patient to follow up. She reports she was out of valsartan for a few days between prescriptions, states she has now picked up prescription. States she has been working more and exercising less. Patient denies hypotensive s/sx (lightheadedness, dizziness, nausea, fatigue); patient denies hypertensive s/sx (SOB, CP, severe headaches, changes in vision).     Assessment:  Reviewed recent readings. Per 2017 ACC/ AHA HTN guidelines (goal of BP < 130/80), current 30-day average needs to be addressed more thoroughly today.     Plan:  Continue current medication regimen. Suspect BP will improve with resumption of valsartan. Reminded patient I can assist with prescriptions for BP medications. Will notify health  of decreased exercise. I will continue to monitor regularly and will follow-up in 4 to 6 weeks, sooner if blood pressure begins to trend upward or downward.     Current medication regimen:  Hypertension Medications             amLODIPine (NORVASC) 5 MG tablet Take 1 tablet (5 mg total) by mouth once daily.    hydroCHLOROthiazide (HYDRODIURIL) 25 MG tablet Take 1 tablet (25 mg total) by mouth once daily.    metoprolol succinate (TOPROL-XL) 100 MG 24 hr tablet TAKE ONE TABLET BY MOUTH ONCE DAILY    valsartan (DIOVAN) 320 MG tablet TAKE 1 TABLET BY MOUTH ONCE DAILY          Patient denies having questions or concerns. Patient has my contact information and knows to call with any concerns or clinical changes.    
I have reviewed and confirmed nurses' notes...

## 2019-02-25 ENCOUNTER — RX RENEWAL (OUTPATIENT)
Age: 69
End: 2019-02-25

## 2019-03-01 ENCOUNTER — APPOINTMENT (OUTPATIENT)
Dept: INTERNAL MEDICINE | Facility: CLINIC | Age: 69
End: 2019-03-01
Payer: MEDICARE

## 2019-03-01 VITALS
DIASTOLIC BLOOD PRESSURE: 80 MMHG | BODY MASS INDEX: 22.9 KG/M2 | WEIGHT: 160 LBS | HEART RATE: 74 BPM | HEIGHT: 70 IN | OXYGEN SATURATION: 98 % | SYSTOLIC BLOOD PRESSURE: 122 MMHG

## 2019-03-01 PROCEDURE — 99214 OFFICE O/P EST MOD 30 MIN: CPT | Mod: 25

## 2019-03-01 PROCEDURE — 36415 COLL VENOUS BLD VENIPUNCTURE: CPT

## 2019-03-01 NOTE — HISTORY OF PRESENT ILLNESS
[FreeTextEntry1] : Patient presents for followup on hypertension/hyperlipidemia/med check. Patient is currently fasting.Patient is currently on quinapril for hypertension and on red yeast rice for hyperlipidemia.\par -Patient is complaining of memory difficulties lately, his wife notices an issue also. Patient states" it's not that bad".

## 2019-03-01 NOTE — PHYSICAL EXAM
[General Appearance - In No Acute Distress] : in no acute distress [] : no respiratory distress [Respiration, Rhythm And Depth] : normal respiratory rhythm and effort [Auscultation Breath Sounds / Voice Sounds] : lungs were clear to auscultation bilaterally [Heart Rate And Rhythm] : heart rate was normal and rhythm regular [Affect] : the affect was normal [Mood] : the mood was normal [No Focal Deficits] : no focal deficits [de-identified] : MMJOHN 27/30, missed 1/3 recall and confused o and r in the word WORLD

## 2019-03-01 NOTE — ASSESSMENT
[FreeTextEntry1] : Labs will be sent out/ further recommendations will be made based on lab results. Patient advised to continue present medications with diet/exercise and specialist followup.Mind activities encouraged i.e. word searches. Offered patient neurology evaluation, brain imaging or medication which he currently declines but will report any worsening. Patient will return to the office for complete physical exam in Aug\par \par \par \par discussed shingrix/Prevnar/flu vaccine=declines\par colonoscopy was 8/16 and was normal-followup in 5years-Dr. Stevens\par specialists include\par 1. cardiology prn-Dr. Ruano-2013\par 2. urologyQyear-Dr. Goldberg\par 3.dermatology q. year-Dr. Agustin\par 4.ophthalmology prn-Dr. Mcclelland\par 24-hour urine done 1/2018-container given to recheck protein/cc\par Hep C screening in past was negative\par \par \par \par \par

## 2019-03-05 ENCOUNTER — RESULT REVIEW (OUTPATIENT)
Age: 69
End: 2019-03-05

## 2019-03-05 LAB
ALBUMIN SERPL ELPH-MCNC: 4.7 G/DL
ALP BLD-CCNC: 59 U/L
ALT SERPL-CCNC: 18 U/L
ANION GAP SERPL CALC-SCNC: 21 MMOL/L
AST SERPL-CCNC: 19 U/L
BASOPHILS # BLD AUTO: 0.03 K/UL
BASOPHILS NFR BLD AUTO: 0.5 %
BILIRUB SERPL-MCNC: 0.6 MG/DL
BUN SERPL-MCNC: 20 MG/DL
CALCIUM SERPL-MCNC: 9.9 MG/DL
CHLORIDE SERPL-SCNC: 105 MMOL/L
CHOLEST SERPL-MCNC: 231 MG/DL
CHOLEST/HDLC SERPL: 2.7 RATIO
CO2 SERPL-SCNC: 19 MMOL/L
CREAT SERPL-MCNC: 1.25 MG/DL
EOSINOPHIL # BLD AUTO: 0.1 K/UL
EOSINOPHIL NFR BLD AUTO: 1.8 %
GLUCOSE SERPL-MCNC: 102 MG/DL
HCT VFR BLD CALC: 47.1 %
HDLC SERPL-MCNC: 86 MG/DL
HGB BLD-MCNC: 15.4 G/DL
IMM GRANULOCYTES NFR BLD AUTO: 0.2 %
LDLC SERPL CALC-MCNC: 124 MG/DL
LYMPHOCYTES # BLD AUTO: 1.44 K/UL
LYMPHOCYTES NFR BLD AUTO: 26 %
MAN DIFF?: NORMAL
MCHC RBC-ENTMCNC: 32.2 PG
MCHC RBC-ENTMCNC: 32.7 GM/DL
MCV RBC AUTO: 98.5 FL
MONOCYTES # BLD AUTO: 0.47 K/UL
MONOCYTES NFR BLD AUTO: 8.5 %
NEUTROPHILS # BLD AUTO: 3.49 K/UL
NEUTROPHILS NFR BLD AUTO: 63 %
PLATELET # BLD AUTO: 260 K/UL
POTASSIUM SERPL-SCNC: 4.3 MMOL/L
PROT SERPL-MCNC: 7 G/DL
RBC # BLD: 4.78 M/UL
RBC # FLD: 12.3 %
RPR SER-TITR: NORMAL
SODIUM SERPL-SCNC: 145 MMOL/L
TRIGL SERPL-MCNC: 106 MG/DL
TSH SERPL-ACNC: 1.34 UIU/ML
VIT B12 SERPL-MCNC: 995 PG/ML
WBC # FLD AUTO: 5.54 K/UL

## 2019-08-06 ENCOUNTER — NON-APPOINTMENT (OUTPATIENT)
Age: 69
End: 2019-08-06

## 2019-08-06 ENCOUNTER — APPOINTMENT (OUTPATIENT)
Dept: INTERNAL MEDICINE | Facility: CLINIC | Age: 69
End: 2019-08-06
Payer: MEDICARE

## 2019-08-06 ENCOUNTER — FORM ENCOUNTER (OUTPATIENT)
Age: 69
End: 2019-08-06

## 2019-08-06 VITALS
RESPIRATION RATE: 12 BRPM | WEIGHT: 154 LBS | DIASTOLIC BLOOD PRESSURE: 80 MMHG | BODY MASS INDEX: 22.05 KG/M2 | HEART RATE: 76 BPM | HEIGHT: 70 IN | SYSTOLIC BLOOD PRESSURE: 120 MMHG

## 2019-08-06 DIAGNOSIS — K63.5 POLYP OF COLON: ICD-10-CM

## 2019-08-06 DIAGNOSIS — N40.0 BENIGN PROSTATIC HYPERPLASIA WITHOUT LOWER URINARY TRACT SYMPMS: ICD-10-CM

## 2019-08-06 PROCEDURE — 93000 ELECTROCARDIOGRAM COMPLETE: CPT

## 2019-08-06 PROCEDURE — G0439: CPT

## 2019-08-06 PROCEDURE — 36415 COLL VENOUS BLD VENIPUNCTURE: CPT

## 2019-08-06 PROCEDURE — G0444 DEPRESSION SCREEN ANNUAL: CPT | Mod: 59

## 2019-08-06 PROCEDURE — G0442 ANNUAL ALCOHOL SCREEN 15 MIN: CPT | Mod: 59

## 2019-08-06 NOTE — ASSESSMENT
[FreeTextEntry1] : 68-year-old male currently medically stable with controlled hypertension on present medications without any indication of active medical issues.\par \par Patient with memory difficulties which are relatively mild but persistent therefore recommend MRI of the brain and neurology evaluation which the patient is reluctant to do. Referrals given for both\par \par The patient is status post substernal chest pain July 2018 with cardiac catheterization improving he has no CAD.\par \par Patient is to continue present medications and supplements along with his good diet and exercise.\par \par Colonoscopy August 2016 with followup in 5 years\par Urology followup as scheduled\par \par Prevnar vaccine and shingles vaccine discussed. Patient again declines\par \par Followup in 6 months

## 2019-08-06 NOTE — HEALTH RISK ASSESSMENT
[Good] : ~his/her~ current health as good [Very Good] : ~his/her~  mood as very good [Yes] : Yes [2 - 3 times a week (3 pts)] : 2 - 3  times a week (3 points) [1 or 2 (0 pts)] : 1 or 2 (0 points) [Never (0 pts)] : Never (0 points) [No falls in past year] : Patient reported no falls in the past year [0] : 2) Feeling down, depressed, or hopeless: Not at all (0) [None] : None [With Significant Other] : lives with significant other [# of Members in Household ___] :  household currently consist of [unfilled] member(s) [Retired] : retired [] :  [College] : College [Sexually Active] : sexually active [# Of Children ___] : has [unfilled] children [Feels Safe at Home] : Feels safe at home [Fully functional (using the telephone, shopping, preparing meals, housekeeping, doing laundry, using] : Fully functional and needs no help or supervision to perform IADLs (using the telephone, shopping, preparing meals, housekeeping, doing laundry, using transportation, managing medications and managing finances) [Fully functional (bathing, dressing, toileting, transferring, walking, feeding)] : Fully functional (bathing, dressing, toileting, transferring, walking, feeding) [Smoke Detector] : smoke detector [Carbon Monoxide Detector] : carbon monoxide detector [Seat Belt] :  uses seat belt [Reviewed no changes] : Reviewed no changes [Discussed at today's visit] : Advance Directives Discussed at today's visit [Designated Healthcare Proxy] : Designated healthcare proxy [Name: ___] : Health Care Proxy's Name: [unfilled]  [] : No [No] : In the past 12 months have you used drugs other than those required for medical reasons? No [Audit-CScore] : 3 [de-identified] : exercises [de-identified] : good [[id number]] : 0 [Change in mental status noted] : No change in mental status noted [Reports changes in hearing] : Reports no changes in hearing [Reports changes in vision] : Reports no changes in vision [Reports changes in dental health] : Reports no changes in dental health [Guns at Home] : no guns at home [Sunscreen] : does not use sunscreen [FreeTextEntry2] :  [de-identified] : recommended sunscreen [AdvancecareDate] : 08/19

## 2019-08-06 NOTE — HISTORY OF PRESENT ILLNESS
[FreeTextEntry1] : 68-year-old male whose only chronic medical issues hypertension for which he takes Quinipril and BPH with erectile dysfunction for which he follows with urologist Dr. Santiago presents for his yearly physical.\par \par The patient does run a mildly elevated creatinine with 24-hour urine creatinine clearance January 2018= 67 mL/minute.\par \par July 2018 the patient had chest pain while at the gym therefore went to the ER with evaluation showing negative cardiac enzymes.\par Cardiac catheterization was normal with no CAD.\par No issues since.\par \par He follows a good diet and exercises regularly and his weight is stable.\par \par For the past 2 years the patient's wife has been stating the patient has some memory difficulties which the patient feels she over states it.\par He does admit he feels his memory has gotten worse.\par Last blood work B12, RPR and TSH were checked all of which were normal/negative.\par

## 2019-08-06 NOTE — COUNSELING
[None] : None [Good understanding] : Patient has a good understanding of lifestyle changes and the steps needed to achieve self management goals [de-identified] : continue diet and exercise

## 2019-08-06 NOTE — PHYSICAL EXAM
[General Appearance - Alert] : alert [Sclera] : the sclera and conjunctiva were normal [General Appearance - In No Acute Distress] : in no acute distress [PERRL With Normal Accommodation] : pupils were equal in size, round, and reactive to light [Extraocular Movements] : extraocular movements were intact [Outer Ear] : the ears and nose were normal in appearance [Oropharynx] : the oropharynx was normal [Neck Cervical Mass (___cm)] : no neck mass was observed [Neck Appearance] : the appearance of the neck was normal [Thyroid Diffuse Enlargement] : the thyroid was not enlarged [Jugular Venous Distention Increased] : there was no jugular-venous distention [Thyroid Nodule] : there were no palpable thyroid nodules [Auscultation Breath Sounds / Voice Sounds] : lungs were clear to auscultation bilaterally [Heart Rate And Rhythm] : heart rate was normal and rhythm regular [Heart Sounds] : normal S1 and S2 [Heart Sounds Gallop] : no gallops [Heart Sounds Pericardial Friction Rub] : no pericardial rub [Murmurs] : no murmurs [Abdominal Aorta] : the abdominal aorta was normal [Arterial Pulses Carotid] : carotid pulses were normal with no bruits [Arterial Pulses Femoral] : femoral pulses were normal without bruits [Full Pulse] : the pedal pulses are present [Veins - Varicosity Changes] : there were no varicosital changes [Edema] : there was no peripheral edema [Abdomen Soft] : soft [Bowel Sounds] : normal bowel sounds [Abdomen Tenderness] : non-tender [Abdomen Mass (___ Cm)] : no abdominal mass palpated [Cervical Lymph Nodes Enlarged Posterior Bilaterally] : posterior cervical [Cervical Lymph Nodes Enlarged Anterior Bilaterally] : anterior cervical [Supraclavicular Lymph Nodes Enlarged Bilaterally] : supraclavicular [Inguinal Lymph Nodes Enlarged Bilaterally] : inguinal [Femoral Lymph Nodes Enlarged Bilaterally] : femoral [Axillary Lymph Nodes Enlarged Bilaterally] : axillary [No Spinal Tenderness] : no spinal tenderness [Abnormal Walk] : normal gait [Musculoskeletal - Swelling] : no joint swelling seen [Nail Clubbing] : no clubbing  or cyanosis of the fingernails [Skin Color & Pigmentation] : normal skin color and pigmentation [Motor Tone] : muscle strength and tone were normal [] : no rash [Skin Turgor] : normal skin turgor [No Focal Deficits] : no focal deficits [Cranial Nerves] : cranial nerves 2-12 were intact [Oriented To Time, Place, And Person] : oriented to person, place, and time [Affect] : the affect was normal [Impaired Insight] : insight and judgment were intact [FreeTextEntry1] : By urology [Over the Past 2 Weeks, Have You Felt Down, Depressed, or Hopeless?] : 1.) Over the past 2 weeks, have you felt down, depressed, or hopeless? No [Over the Past 2 Weeks, Have You Felt Little Interest or Pleasure Doing Things?] : 2.) Over the past 2 weeks, have you felt little interest or pleasure doing things? No

## 2019-08-07 ENCOUNTER — RESULT REVIEW (OUTPATIENT)
Age: 69
End: 2019-08-07

## 2019-08-07 ENCOUNTER — OUTPATIENT (OUTPATIENT)
Dept: OUTPATIENT SERVICES | Facility: HOSPITAL | Age: 69
LOS: 1 days | End: 2019-08-07
Payer: COMMERCIAL

## 2019-08-07 ENCOUNTER — APPOINTMENT (OUTPATIENT)
Dept: MRI IMAGING | Facility: CLINIC | Age: 69
End: 2019-08-07
Payer: MEDICARE

## 2019-08-07 DIAGNOSIS — Z00.00 ENCOUNTER FOR GENERAL ADULT MEDICAL EXAMINATION WITHOUT ABNORMAL FINDINGS: ICD-10-CM

## 2019-08-07 DIAGNOSIS — R41.3 OTHER AMNESIA: ICD-10-CM

## 2019-08-07 LAB
ALBUMIN SERPL ELPH-MCNC: 4.5 G/DL
ALP BLD-CCNC: 65 U/L
ALT SERPL-CCNC: 22 U/L
ANION GAP SERPL CALC-SCNC: 15 MMOL/L
APPEARANCE: CLEAR
AST SERPL-CCNC: 23 U/L
BACTERIA: ABNORMAL
BASOPHILS # BLD AUTO: 0.05 K/UL
BASOPHILS NFR BLD AUTO: 0.7 %
BILIRUB SERPL-MCNC: 0.4 MG/DL
BILIRUBIN URINE: NEGATIVE
BLOOD URINE: NEGATIVE
BUN SERPL-MCNC: 24 MG/DL
CALCIUM SERPL-MCNC: 9.7 MG/DL
CHLORIDE SERPL-SCNC: 106 MMOL/L
CHOLEST SERPL-MCNC: 200 MG/DL
CHOLEST/HDLC SERPL: 2.3 RATIO
CO2 SERPL-SCNC: 22 MMOL/L
COLOR: YELLOW
CREAT SERPL-MCNC: 1.45 MG/DL
EOSINOPHIL # BLD AUTO: 0.09 K/UL
EOSINOPHIL NFR BLD AUTO: 1.3 %
GLUCOSE QUALITATIVE U: NEGATIVE
GLUCOSE SERPL-MCNC: 97 MG/DL
HCT VFR BLD CALC: 42.7 %
HDLC SERPL-MCNC: 88 MG/DL
HGB BLD-MCNC: 14.3 G/DL
HYALINE CASTS: 0 /LPF
IMM GRANULOCYTES NFR BLD AUTO: 0.3 %
KETONES URINE: NEGATIVE
LDLC SERPL CALC-MCNC: 100 MG/DL
LEUKOCYTE ESTERASE URINE: NEGATIVE
LYMPHOCYTES # BLD AUTO: 1.54 K/UL
LYMPHOCYTES NFR BLD AUTO: 21.9 %
MAN DIFF?: NORMAL
MCHC RBC-ENTMCNC: 33.1 PG
MCHC RBC-ENTMCNC: 33.5 GM/DL
MCV RBC AUTO: 98.8 FL
MICROSCOPIC-UA: NORMAL
MONOCYTES # BLD AUTO: 0.69 K/UL
MONOCYTES NFR BLD AUTO: 9.8 %
NEUTROPHILS # BLD AUTO: 4.65 K/UL
NEUTROPHILS NFR BLD AUTO: 66 %
NITRITE URINE: NEGATIVE
PH URINE: 5.5
PLATELET # BLD AUTO: 280 K/UL
POTASSIUM SERPL-SCNC: 4.9 MMOL/L
PROT SERPL-MCNC: 6.6 G/DL
PROTEIN URINE: NEGATIVE
RBC # BLD: 4.32 M/UL
RBC # FLD: 12.6 %
RED BLOOD CELLS URINE: 1 /HPF
SODIUM SERPL-SCNC: 143 MMOL/L
SPECIFIC GRAVITY URINE: 1.02
SQUAMOUS EPITHELIAL CELLS: 2 /HPF
TRIGL SERPL-MCNC: 59 MG/DL
URIC ACID CRYSTALS: ABNORMAL
URINE COMMENTS: NORMAL
UROBILINOGEN URINE: NORMAL
WBC # FLD AUTO: 7.04 K/UL
WHITE BLOOD CELLS URINE: 1 /HPF

## 2019-08-07 PROCEDURE — 70551 MRI BRAIN STEM W/O DYE: CPT | Mod: 26

## 2019-08-07 PROCEDURE — 70551 MRI BRAIN STEM W/O DYE: CPT

## 2019-08-09 ENCOUNTER — RESULT REVIEW (OUTPATIENT)
Age: 69
End: 2019-08-09

## 2019-08-13 ENCOUNTER — APPOINTMENT (OUTPATIENT)
Dept: NEUROLOGY | Facility: CLINIC | Age: 69
End: 2019-08-13
Payer: MEDICARE

## 2019-08-13 VITALS
WEIGHT: 155 LBS | DIASTOLIC BLOOD PRESSURE: 70 MMHG | SYSTOLIC BLOOD PRESSURE: 118 MMHG | BODY MASS INDEX: 22.19 KG/M2 | HEIGHT: 70 IN

## 2019-08-13 PROCEDURE — 99204 OFFICE O/P NEW MOD 45 MIN: CPT

## 2019-08-13 NOTE — CONSULT LETTER
[Dear  ___] : Dear  [unfilled], [Consult Letter:] : I had the pleasure of evaluating your patient, [unfilled]. [Please see my note below.] : Please see my note below. [Consult Closing:] : Thank you very much for allowing me to participate in the care of this patient.  If you have any questions, please do not hesitate to contact me. [FreeTextEntry3] : Geo Mina M.D., Ph.D. DPN-N\par [facility]\par Neurology at [place]\par Medical Director of Stroke Services\par [facility]\par  [Sincerely,] : Sincerely,

## 2019-08-13 NOTE — DATA REVIEWED
[de-identified] : He had an MRI of his brain on August 7, 2019. A chance to review the images. There was mild small vessel ischemic changes. There was no evidence for acute stroke mass or bleed. There is generalized atrophy proportion to age. 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

## 2019-08-13 NOTE — ASSESSMENT
[FreeTextEntry1] : This is a 68-year-old man with progressive memory problems. He also is continent anxiety. His wife also states that he has some sort of increased oral response. At this time I would like to work him up with her repeating and labs for reversible causes of dementia. I would like to check an EEG to look for nonclinical seizure activity. Also to look for any characteristic changes of prion diseases. I would like to start a statin Lipitor 10 mg as he has a borderline elevated LDL although his HDL is excellent at 88. He did showed microvascular ischemic changes on the MRI and I would like to keep these to a minimum in the future. I will also send her for neuropsychologic testing to further define which domains of memory are deficient. I will see him back in 6 weeks, sooner should the need arise.

## 2019-08-13 NOTE — REVIEW OF SYSTEMS
[Anxiety] : anxiety [As Noted in HPI] : as noted in HPI [Memory Lapses or Loss] : memory loss [Confused or Disoriented] : confusion [Negative] : Heme/Lymph

## 2019-08-13 NOTE — PHYSICAL EXAM
[Person] : oriented to person [Place] : oriented to place [Time] : oriented to time [Remote Intact] : remote memory intact [Short Term Intact] : short term memory impaired [Registration Intact] : recent registration memory intact [Span Intact] : the attention span was normal [Concentration Intact] : normal concentrating ability [Visual Intact] : visual attention was ~T not ~L decreased [Repeating Phrases] : no difficulty repeating a phrase [Naming Objects] : no difficulty naming common objects [Fluency] : fluency intact [Comprehension] : comprehension intact [Past History] : adequate knowledge of personal past history [Current Events] : adequate knowledge of current events [Cranial Nerves Oculomotor (III)] : extraocular motion intact [Cranial Nerves Optic (II)] : visual acuity intact bilaterally,  visual fields full to confrontation, pupils equal round and reactive to light [Cranial Nerves Facial (VII)] : face symmetrical [Cranial Nerves Trigeminal (V)] : facial sensation intact symmetrically [Cranial Nerves Vestibulocochlear (VIII)] : hearing was intact bilaterally [Cranial Nerves Glossopharyngeal (IX)] : tongue and palate midline [Cranial Nerves Accessory (XI - Cranial And Spinal)] : head turning and shoulder shrug symmetric [Cranial Nerves Hypoglossal (XII)] : there was no tongue deviation with protrusion [Motor Strength] : muscle strength was normal in all four extremities [No Muscle Atrophy] : normal bulk in all four extremities [Paresis Pronator Drift Right-Sided] : no pronator drift on the right [Paresis Pronator Drift Left-Sided] : no pronator drift on the left [Sensation Tactile Decrease] : light touch was intact [Sensation Pain / Temperature Decrease] : pain and temperature was intact [Proprioception] : proprioception was intact [Sensation Vibration Decrease] : vibration was intact [Balance] : balance was intact [Past-pointing] : there was no past-pointing [Tremor] : no tremor present [2+] : Ankle jerk left 2+ [FreeTextEntry4] : 0/3 recall 2/3 with prompts [Sclera] : the sclera and conjunctiva were normal [Extraocular Movements] : extraocular movements were intact [PERRL With Normal Accommodation] : pupils were equal in size, round, reactive to light, with normal accommodation [Optic Disc Abnormality] : the optic disc were normal in size and color [No APD] : no afferent pupillary defect [Full Visual Field] : full visual field [No DENEEN] : no internuclear ophthalmoplegia [Papilledema Of Both Eyes] : no papilledema [Edema] : there was no peripheral edema [Abnormal Walk] : normal gait [Motor Tone] : muscle strength and tone were normal [Involuntary Movements] : no involuntary movements were seen

## 2019-08-13 NOTE — HISTORY OF PRESENT ILLNESS
[FreeTextEntry1] : Initial office visit August 13, 2019:\par This is a 68-year-old man who presents today for a violation of memory loss. He is here with his wife who helps provide collateral history. He's been having memory loss for at least a year if not longer. He is forgetful of what he told to him. He forgets conversations. Times were disco blank. He is having minimal difficulty with some keeping some of the finances but overall he appears to be doing that task fairly well. He is not getting lost her leaving the oven. He does have problems finding things at home especially the ones that he keeps in the same place every time period and his wife states that he gets increasingly stressed over not finding them. He does have an area as well especially over new situations. His wife also reports a decreased sleep may be averaging 4 or 5 hours per night. He had an MRI done which we refer to below. He is here today for neurologic evaluation.

## 2019-08-15 LAB
FOLATE SERPL-MCNC: >20 NG/ML
TSH SERPL-ACNC: 1.45 UIU/ML
VIT B12 SERPL-MCNC: 965 PG/ML

## 2019-08-19 LAB
METHYLMALONATE SERPL-SCNC: 280 NMOL/L
RPR SER-TITR: NORMAL

## 2019-09-03 ENCOUNTER — APPOINTMENT (OUTPATIENT)
Dept: PSYCHIATRY | Facility: CLINIC | Age: 69
End: 2019-09-03
Payer: MEDICARE

## 2019-09-03 PROCEDURE — 90791 PSYCH DIAGNOSTIC EVALUATION: CPT

## 2019-09-05 ENCOUNTER — APPOINTMENT (OUTPATIENT)
Dept: INTERNAL MEDICINE | Facility: CLINIC | Age: 69
End: 2019-09-05
Payer: MEDICARE

## 2019-09-05 ENCOUNTER — APPOINTMENT (OUTPATIENT)
Dept: NEUROLOGY | Facility: CLINIC | Age: 69
End: 2019-09-05
Payer: MEDICARE

## 2019-09-05 VITALS
SYSTOLIC BLOOD PRESSURE: 120 MMHG | BODY MASS INDEX: 22.19 KG/M2 | WEIGHT: 155 LBS | OXYGEN SATURATION: 90 % | HEIGHT: 70 IN | DIASTOLIC BLOOD PRESSURE: 75 MMHG | HEART RATE: 77 BPM

## 2019-09-05 PROCEDURE — 95819 EEG AWAKE AND ASLEEP: CPT

## 2019-09-05 PROCEDURE — 36415 COLL VENOUS BLD VENIPUNCTURE: CPT

## 2019-09-05 PROCEDURE — 99213 OFFICE O/P EST LOW 20 MIN: CPT | Mod: 25

## 2019-09-05 PROCEDURE — 93040 RHYTHM ECG WITH REPORT: CPT

## 2019-09-05 NOTE — PHYSICAL EXAM
[] : no respiratory distress [General Appearance - In No Acute Distress] : in no acute distress [Respiration, Rhythm And Depth] : normal respiratory rhythm and effort [Auscultation Breath Sounds / Voice Sounds] : lungs were clear to auscultation bilaterally [Affect] : the affect was normal [Heart Rate And Rhythm] : heart rate was normal and rhythm regular [Mood] : the mood was normal

## 2019-09-06 ENCOUNTER — RX CHANGE (OUTPATIENT)
Age: 69
End: 2019-09-06

## 2019-09-06 ENCOUNTER — RESULT REVIEW (OUTPATIENT)
Age: 69
End: 2019-09-06

## 2019-09-06 LAB
ANION GAP SERPL CALC-SCNC: 11 MMOL/L
BUN SERPL-MCNC: 20 MG/DL
CALCIUM SERPL-MCNC: 9.4 MG/DL
CHLORIDE SERPL-SCNC: 107 MMOL/L
CO2 SERPL-SCNC: 27 MMOL/L
CREAT SERPL-MCNC: 1.38 MG/DL
GLUCOSE SERPL-MCNC: 102 MG/DL
POTASSIUM SERPL-SCNC: 4.2 MMOL/L
SODIUM SERPL-SCNC: 145 MMOL/L

## 2019-09-06 RX ORDER — ATORVASTATIN CALCIUM 10 MG/1
10 TABLET, FILM COATED ORAL DAILY
Qty: 30 | Refills: 5 | Status: DISCONTINUED | COMMUNITY
Start: 2019-08-13 | End: 2019-09-06

## 2019-09-06 NOTE — HISTORY OF PRESENT ILLNESS
[FreeTextEntry1] : Patient presents for followup on hypertension/repeat labs.Patient is currently on quinapril for hypertension And was found to have BUN/creatinine elevated at 24/1.4 with previous blood work. Abnormal results discussed with patient and questions answered\par -Newly on Lipitor/aspirin as MRI showed microvascular changes  \par -Patient saw neuropsychiatry and had extensive testing done, results are pending

## 2019-09-06 NOTE — ASSESSMENT
[FreeTextEntry1] : Labs will be sent out/ further recommendations will be made based on lab results. Patient advised to continue present medications with diet/exercise and specialist followup. RTO as sched for reg check\par \par \par discussed shingrix/Prevnar/flu vaccine=declines\par colonoscopy was 8/16 and was normal-followup in 5years-Dr. Stevens\par specialists include\par 1. cardiology prn-Dr. Ruano\par 2. urologyQyear-Dr. Goldberg\par 3.dermatology q. year-Dr. Agustin\par 4.ophthalmology prn-Dr. Mcclelland\par 5.Neuro-Dr. Mina\par 6.Neuro-psych-Dr. Galaviz\par 24-hour urine done 1/2018-container given to recheck protein/cc\par Hep C screening in past was negative\par Cath 7/2018\par MRI brain 7/2019\par \par \par \par \par

## 2019-09-09 ENCOUNTER — LABORATORY RESULT (OUTPATIENT)
Age: 69
End: 2019-09-09

## 2019-09-10 ENCOUNTER — RESULT REVIEW (OUTPATIENT)
Age: 69
End: 2019-09-10

## 2019-09-10 LAB
BSA DERIVED: 1.73 M2
CREAT 24H UR-MCNC: 1.6 G/24 H
CREAT 24H UR-MCNC: 1.6 G/24 H
CREAT ?TM UR-MCNC: 67 MG/DL
CREAT ?TM UR-MCNC: 67 MG/DL
CREAT CL 24H UR+SERPL-VRATE: 81 ML/MIN
PROT 24H UR-MRATE: 4 MG/DL
PROT ?TM UR-MCNC: 24 HR
PROT ?TM UR-MCNC: 24 HR
PROT UR-MCNC: 93 MG/24 H
SPECIMEN VOL 24H UR: 2325 ML
SPECIMEN VOL 24H UR: 2325 ML

## 2019-09-24 ENCOUNTER — APPOINTMENT (OUTPATIENT)
Dept: PSYCHIATRY | Facility: CLINIC | Age: 69
End: 2019-09-24
Payer: MEDICARE

## 2019-09-24 PROCEDURE — 96132 NRPSYC TST EVAL PHYS/QHP 1ST: CPT

## 2019-10-21 ENCOUNTER — RX CHANGE (OUTPATIENT)
Age: 69
End: 2019-10-21

## 2019-10-21 RX ORDER — DONEPEZIL HYDROCHLORIDE 10 MG/1
10 TABLET ORAL
Qty: 30 | Refills: 5 | Status: DISCONTINUED | COMMUNITY
Start: 2019-09-27 | End: 2019-10-21

## 2019-10-25 ENCOUNTER — RX RENEWAL (OUTPATIENT)
Age: 69
End: 2019-10-25

## 2019-11-04 ENCOUNTER — APPOINTMENT (OUTPATIENT)
Dept: NEUROLOGY | Facility: CLINIC | Age: 69
End: 2019-11-04
Payer: MEDICARE

## 2019-11-04 VITALS
HEIGHT: 70 IN | SYSTOLIC BLOOD PRESSURE: 140 MMHG | DIASTOLIC BLOOD PRESSURE: 80 MMHG | WEIGHT: 158 LBS | BODY MASS INDEX: 22.62 KG/M2

## 2019-11-04 PROCEDURE — 99213 OFFICE O/P EST LOW 20 MIN: CPT

## 2019-11-04 RX ORDER — DONEPEZIL HYDROCHLORIDE 10 MG/1
10 TABLET ORAL
Qty: 90 | Refills: 2 | Status: DISCONTINUED | COMMUNITY
Start: 2019-10-21 | End: 2019-11-04

## 2019-11-04 RX ORDER — DONEPEZIL HYDROCHLORIDE 5 MG/1
5 TABLET ORAL
Qty: 30 | Refills: 2 | Status: DISCONTINUED | COMMUNITY
Start: 2019-09-27 | End: 2019-11-04

## 2019-11-04 NOTE — CONSULT LETTER
[Dear  ___] : Dear  [unfilled], [Courtesy Letter:] : I had the pleasure of seeing your patient, [unfilled], in my office today. [Please see my note below.] : Please see my note below. [Consult Closing:] : Thank you very much for allowing me to participate in the care of this patient.  If you have any questions, please do not hesitate to contact me. [Sincerely,] : Sincerely, [FreeTextEntry3] : Geo Mina M.D., Ph.D. DPN-N\par [facility]\par Neurology at [place]\par Medical Director of Stroke Services\par [facility]\par

## 2019-11-04 NOTE — HISTORY OF PRESENT ILLNESS
[FreeTextEntry1] : Initial office visit August 13, 2019:\par This is a 68-year-old man who presents today for a violation of memory loss. He is here with his wife who helps provide collateral history. He's been having memory loss for at least a year if not longer. He is forgetful of what he told to him. He forgets conversations. Times were disco blank. He is having minimal difficulty with some keeping some of the finances but overall he appears to be doing that task fairly well. He is not getting lost her leaving the oven. He does have problems finding things at home especially the ones that he keeps in the same place every time period and his wife states that he gets increasingly stressed over not finding them. He does have an area as well especially over new situations. His wife also reports a decreased sleep may be averaging 4 or 5 hours per night. He had an MRI done which we refer to below. He is here today for neurologic evaluation.\par \par Followup November 4, 2019:\par This is a 69-year-old man who presents today for neurologic followup of memory loss. He has seen neuropsychology and may have mild cognitive impairment. His MRI did show enlarged ventricles but there was a significant amount of atrophy. He does not have other signs or symptoms consistent with normal pressure hydrocephalus. Neuropsychology wants to followup in 12 months. He had been prescribed donepezil. He had GI symptoms and stopped taking it. He is here today for routine neurologic followup.

## 2019-11-04 NOTE — PHYSICAL EXAM
[Person] : oriented to person [Place] : oriented to place [Time] : oriented to time [Short Term Intact] : short term memory impaired [Remote Intact] : remote memory intact [Registration Intact] : recent registration memory intact [Span Intact] : the attention span was normal [Concentration Intact] : normal concentrating ability [Visual Intact] : visual attention was ~T not ~L decreased [Naming Objects] : no difficulty naming common objects [Repeating Phrases] : no difficulty repeating a phrase [Fluency] : fluency intact [Comprehension] : comprehension intact [Current Events] : adequate knowledge of current events [Past History] : adequate knowledge of personal past history [Cranial Nerves Optic (II)] : visual acuity intact bilaterally,  visual fields full to confrontation, pupils equal round and reactive to light [Cranial Nerves Oculomotor (III)] : extraocular motion intact [Cranial Nerves Trigeminal (V)] : facial sensation intact symmetrically [Cranial Nerves Facial (VII)] : face symmetrical [Cranial Nerves Vestibulocochlear (VIII)] : hearing was intact bilaterally [Cranial Nerves Glossopharyngeal (IX)] : tongue and palate midline [Cranial Nerves Accessory (XI - Cranial And Spinal)] : head turning and shoulder shrug symmetric [Cranial Nerves Hypoglossal (XII)] : there was no tongue deviation with protrusion [Motor Tone] : muscle tone was normal in all four extremities [Motor Strength] : muscle strength was normal in all four extremities [Involuntary Movements] : no involuntary movements were seen [No Muscle Atrophy] : normal bulk in all four extremities [Paresis Pronator Drift Right-Sided] : no pronator drift on the right [Paresis Pronator Drift Left-Sided] : no pronator drift on the left [Sensation Tactile Decrease] : light touch was intact [Sensation Pain / Temperature Decrease] : pain and temperature was intact [Sensation Vibration Decrease] : vibration was intact [Proprioception] : proprioception was intact [Abnormal Walk] : normal gait [Balance] : balance was intact [Tremor] : no tremor present [Coordination - Dysmetria Impaired Finger-to-Nose Bilateral] : not present [2+] : Patella left 2+ [FreeTextEntry4] : 0/3 recall 2/3 with prompts [Sclera] : the sclera and conjunctiva were normal [PERRL With Normal Accommodation] : pupils were equal in size, round, reactive to light, with normal accommodation [Extraocular Movements] : extraocular movements were intact [No APD] : no afferent pupillary defect [No DENEEN] : no internuclear ophthalmoplegia [Full Visual Field] : full visual field

## 2019-11-04 NOTE — ASSESSMENT
[FreeTextEntry1] : This is a 69-year-old man with cognitive impairment/dementia. At this time I will start Namenda. I will titrate from 5 mg up to 10 mg twice a day over the course of the next month. After that he will be on a standing dose of 10 mg twice a day. Donepezil was discontinued secondary to GI symptoms. I will see him back in 2 months for followup, sooner should the need arise.

## 2019-11-11 ENCOUNTER — APPOINTMENT (OUTPATIENT)
Dept: ORTHOPEDIC SURGERY | Facility: CLINIC | Age: 69
End: 2019-11-11
Payer: MEDICARE

## 2019-11-11 VITALS
HEART RATE: 65 BPM | HEIGHT: 70 IN | WEIGHT: 158 LBS | SYSTOLIC BLOOD PRESSURE: 121 MMHG | BODY MASS INDEX: 22.62 KG/M2 | DIASTOLIC BLOOD PRESSURE: 79 MMHG

## 2019-11-11 DIAGNOSIS — E78.00 PURE HYPERCHOLESTEROLEMIA, UNSPECIFIED: ICD-10-CM

## 2019-11-11 PROCEDURE — 99203 OFFICE O/P NEW LOW 30 MIN: CPT | Mod: 25

## 2019-11-11 PROCEDURE — 20610 DRAIN/INJ JOINT/BURSA W/O US: CPT | Mod: RT

## 2019-11-11 PROCEDURE — 73030 X-RAY EXAM OF SHOULDER: CPT | Mod: RT

## 2019-11-11 NOTE — DISCUSSION/SUMMARY
[de-identified] : The patient presents with acute on chronic right  shoulder pain for 2 weeks. The patient has tried conservative measures of treatment including rest. The patient has positive  testing on clinical evaluation that  consistent with rotator cuff tendinitis possible tear. I discussed the role of the rotator cuff in shoulder function including dynamic stability and range of motion, as well as pathology that causes shoulder pain including the proximal biceps tendon, subacromial impingement, bursitis and rotator cuff dysfunction/tendinopathy. On clinical evaluation, I believe that the patient's primary concern is the due to cuff tendinitis possible tear.\par \par Nonoperative modalities of treatment include physical therapy for range of motion therapy, rotator cuff strengthening/scapular stabilization, NSAID's (if able), and subacromial corticosteroid injection. Surgical intervention would include arthroscopic rotator cuff repair for tears that are not amenable to surgical nonoperative treatment or fail a trial of nonoperative management.\par \par Considering the patient's presentation, I've recommended a trial of nonoperative treatment that includes cortisone injection, physical therapy and an MRI. I will call the patient with the results . If they have any questions or concerns, I can be available at any time for further discussion.\par \par

## 2019-11-11 NOTE — HISTORY OF PRESENT ILLNESS
[Stable] : stable [6] : an average pain level of 6/10 [8] : a maximum pain level of 8/10 [Constant] : ~He/She~ states the symptoms seem to be constant [None] : No relieving factors are noted [NSAIDs] : relieved by nonsteroidal anti-inflammatory drugs [de-identified] : DARIUS is a 69 year old male who presents today for right shoulder pain.  His pain is sharp in nature.  He denies history of trauma or injury to the right UE in the past.  Patient sustained a fall on the tip of his shoulder while on a cruise several weeks ago.  He endorsed immediate pain to his GH joint.  While on the ship he obtained x-rays that were negative for fracture or dislocation.  He was administered an IM antiinflammatory injection.  He denies SC joint pain although he has a deformity present.  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 \par \par Currently, he has pain with arm abduction past 90 degrees.  Patient reports crepitus with arm elevation.  He endorses mild pain with reaching behind his head.  He denies numbness/tingling to the UE. Patient takes Aleve PRN.  He has not received prior treatment for this issue.\par  [de-identified] : lying down, activity in general [de-identified] : A

## 2019-11-11 NOTE — PROCEDURE
[de-identified] : Injection: Right shoulder (Subacromial).\par Indication: RTC tendonitis\par \par A discussion was had with the patient regarding this procedure and all questions were answered. All risks, benefits and alternatives were discussed. These included but were not limited to bleeding, infection, and allergic reaction. Alcohol was used to clean the skin, and betadine was used to sterilize and prep the area in the posterior aspect of the right shoulder. Ethyl chloride spray was then used as a topical anesthetic. A 22-gauge needle was used to inject 3cc 1% xylocaine, 2cc 0.25% bupivacaine and 1cc of 40mg/mL triamcinolone acetonide into the right subacromial space. A sterile bandage was then applied. The patient tolerated the procedure well and there were no complications.\par

## 2019-11-13 ENCOUNTER — APPOINTMENT (OUTPATIENT)
Dept: DERMATOLOGY | Facility: CLINIC | Age: 69
End: 2019-11-13
Payer: MEDICARE

## 2019-11-13 PROCEDURE — 99214 OFFICE O/P EST MOD 30 MIN: CPT | Mod: 25

## 2019-11-13 PROCEDURE — 17000 DESTRUCT PREMALG LESION: CPT

## 2019-11-14 ENCOUNTER — FORM ENCOUNTER (OUTPATIENT)
Age: 69
End: 2019-11-14

## 2019-11-15 ENCOUNTER — OUTPATIENT (OUTPATIENT)
Dept: OUTPATIENT SERVICES | Facility: HOSPITAL | Age: 69
LOS: 1 days | End: 2019-11-15
Payer: COMMERCIAL

## 2019-11-15 ENCOUNTER — APPOINTMENT (OUTPATIENT)
Dept: MRI IMAGING | Facility: CLINIC | Age: 69
End: 2019-11-15
Payer: MEDICARE

## 2019-11-15 DIAGNOSIS — Z00.00 ENCOUNTER FOR GENERAL ADULT MEDICAL EXAMINATION WITHOUT ABNORMAL FINDINGS: ICD-10-CM

## 2019-11-15 PROCEDURE — 73221 MRI JOINT UPR EXTREM W/O DYE: CPT | Mod: 26,RT

## 2019-11-15 PROCEDURE — 73221 MRI JOINT UPR EXTREM W/O DYE: CPT

## 2019-11-22 ENCOUNTER — RESULT REVIEW (OUTPATIENT)
Age: 69
End: 2019-11-22

## 2019-11-27 ENCOUNTER — RX CHANGE (OUTPATIENT)
Age: 69
End: 2019-11-27

## 2019-12-05 ENCOUNTER — RX RENEWAL (OUTPATIENT)
Age: 69
End: 2019-12-05

## 2019-12-27 ENCOUNTER — APPOINTMENT (OUTPATIENT)
Dept: ORTHOPEDIC SURGERY | Facility: CLINIC | Age: 69
End: 2019-12-27
Payer: MEDICARE

## 2019-12-27 VITALS
WEIGHT: 158 LBS | SYSTOLIC BLOOD PRESSURE: 174 MMHG | BODY MASS INDEX: 22.62 KG/M2 | HEIGHT: 70 IN | HEART RATE: 66 BPM | DIASTOLIC BLOOD PRESSURE: 97 MMHG

## 2019-12-27 PROCEDURE — 99213 OFFICE O/P EST LOW 20 MIN: CPT

## 2020-01-02 ENCOUNTER — RX RENEWAL (OUTPATIENT)
Age: 70
End: 2020-01-02

## 2020-01-06 ENCOUNTER — APPOINTMENT (OUTPATIENT)
Dept: INTERNAL MEDICINE | Facility: CLINIC | Age: 70
End: 2020-01-06
Payer: MEDICARE

## 2020-01-06 ENCOUNTER — NON-APPOINTMENT (OUTPATIENT)
Age: 70
End: 2020-01-06

## 2020-01-06 VITALS
BODY MASS INDEX: 22.33 KG/M2 | RESPIRATION RATE: 12 BRPM | HEART RATE: 67 BPM | SYSTOLIC BLOOD PRESSURE: 120 MMHG | WEIGHT: 156 LBS | HEIGHT: 70 IN | OXYGEN SATURATION: 97 % | DIASTOLIC BLOOD PRESSURE: 70 MMHG

## 2020-01-06 PROCEDURE — 36415 COLL VENOUS BLD VENIPUNCTURE: CPT

## 2020-01-06 PROCEDURE — 99214 OFFICE O/P EST MOD 30 MIN: CPT | Mod: 25

## 2020-01-06 PROCEDURE — 93000 ELECTROCARDIOGRAM COMPLETE: CPT

## 2020-01-06 NOTE — PHYSICAL EXAM
[Normal Sclera/Conjunctiva] : normal sclera/conjunctiva [No Acute Distress] : no acute distress [Well-Appearing] : well-appearing [No JVD] : no jugular venous distention [No Accessory Muscle Use] : no accessory muscle use [No Respiratory Distress] : no respiratory distress  [Normal Rate] : normal rate  [Clear to Auscultation] : lungs were clear to auscultation bilaterally [Regular Rhythm] : with a regular rhythm [No Murmur] : no murmur heard [Normal S1, S2] : normal S1 and S2 [No Carotid Bruits] : no carotid bruits [No Edema] : there was no peripheral edema [Soft] : abdomen soft [Non Tender] : non-tender [No Masses] : no abdominal mass palpated [Non-distended] : non-distended [Normal Gait] : normal gait [No HSM] : no HSM [Normal Affect] : the affect was normal

## 2020-01-07 DIAGNOSIS — Z80.8 FAMILY HISTORY OF MALIGNANT NEOPLASM OF OTHER ORGANS OR SYSTEMS: ICD-10-CM

## 2020-01-07 LAB
ABO + RH PNL BLD: NORMAL
ANION GAP SERPL CALC-SCNC: 13 MMOL/L
APPEARANCE: CLEAR
APTT BLD: 31.3 SEC
BACTERIA: NEGATIVE
BASOPHILS # BLD AUTO: 0.07 K/UL
BASOPHILS NFR BLD AUTO: 0.7 %
BILIRUBIN URINE: NEGATIVE
BLOOD URINE: NEGATIVE
BUN SERPL-MCNC: 16 MG/DL
CALCIUM SERPL-MCNC: 10 MG/DL
CHLORIDE SERPL-SCNC: 102 MMOL/L
CO2 SERPL-SCNC: 26 MMOL/L
COLOR: YELLOW
CREAT SERPL-MCNC: 1.16 MG/DL
EOSINOPHIL # BLD AUTO: 0.1 K/UL
EOSINOPHIL NFR BLD AUTO: 1 %
GLUCOSE QUALITATIVE U: NEGATIVE
GLUCOSE SERPL-MCNC: 86 MG/DL
HCT VFR BLD CALC: 43.6 %
HGB BLD-MCNC: 14.8 G/DL
HYALINE CASTS: 0 /LPF
IMM GRANULOCYTES NFR BLD AUTO: 0.3 %
INR PPP: 0.97 RATIO
KETONES URINE: NEGATIVE
LEUKOCYTE ESTERASE URINE: NEGATIVE
LYMPHOCYTES # BLD AUTO: 1.83 K/UL
LYMPHOCYTES NFR BLD AUTO: 17.9 %
MAN DIFF?: NORMAL
MCHC RBC-ENTMCNC: 32.7 PG
MCHC RBC-ENTMCNC: 33.9 GM/DL
MCV RBC AUTO: 96.2 FL
MICROSCOPIC-UA: NORMAL
MONOCYTES # BLD AUTO: 0.73 K/UL
MONOCYTES NFR BLD AUTO: 7.1 %
NEUTROPHILS # BLD AUTO: 7.48 K/UL
NEUTROPHILS NFR BLD AUTO: 73 %
NITRITE URINE: NEGATIVE
PH URINE: 5
PLATELET # BLD AUTO: 303 K/UL
POTASSIUM SERPL-SCNC: 4.1 MMOL/L
PROTEIN URINE: NEGATIVE
PT BLD: 11 SEC
RBC # BLD: 4.53 M/UL
RBC # FLD: 12.5 %
RED BLOOD CELLS URINE: 0 /HPF
SODIUM SERPL-SCNC: 141 MMOL/L
SPECIFIC GRAVITY URINE: 1.01
SQUAMOUS EPITHELIAL CELLS: 0 /HPF
UROBILINOGEN URINE: NORMAL
WBC # FLD AUTO: 10.24 K/UL
WHITE BLOOD CELLS URINE: 0 /HPF

## 2020-01-07 NOTE — RESULTS/DATA
[] : results reviewed [de-identified] : WNL [de-identified] : WNL [de-identified] : WNL [de-identified] : NSR, first degree AV block

## 2020-01-07 NOTE — HISTORY OF PRESENT ILLNESS
[No Pertinent Cardiac History] : no history of aortic stenosis, atrial fibrillation, coronary artery disease, recent myocardial infarction, or implantable device/pacemaker [No Pertinent Pulmonary History] : no history of asthma, COPD, sleep apnea, or smoking [No Adverse Anesthesia Reaction] : no adverse anesthesia reaction in self or family member [(Patient denies any chest pain, claudication, dyspnea on exertion, orthopnea, palpitations or syncope)] : Patient denies any chest pain, claudication, dyspnea on exertion, orthopnea, palpitations or syncope [Chronic Anticoagulation] : no chronic anticoagulation [Chronic Kidney Disease] : no chronic kidney disease [Diabetes] : no diabetes [FreeTextEntry1] : right rotator cuff tear repair [FreeTextEntry2] : January 10, 2020 [FreeTextEntry3] : Dr Levi [FreeTextEntry4] : 69-year-old male presents for evaluation prior to right rotator cuff tear repair.\par \par Medical history includes hypertension, hyperlipidemia and BPH.\par Patient also with mild cognitive impairment for which he is on Namenda.\par \par No cardiopulmonary, hepatorenal, hematological or diabetes history.\par \par Overall the patient feels well without chest pain, palpitations, shortness of breath or edema.

## 2020-01-07 NOTE — ASSESSMENT
[Patient Optimized for Surgery] : Patient optimized for surgery [Modify anti-platelet treatment prior to procedure] : Modify anti-platelet treatment prior to procedure [No Further Testing Recommended] : no further testing recommended [Continue medications as is] : Continue current medications [As per surgery] : as per surgery [FreeTextEntry4] : 69-year-old male currently medically stable with controlled hypertension and no evidence of medical decompensation.\par Therefore patient currently medically stable and cleared for scheduled low risk procedure.\par Patient advised to discontinue aspirin as well as all supplements. [FreeTextEntry6] : Discontinue aspirin and all supplements

## 2020-01-08 ENCOUNTER — MESSAGE (OUTPATIENT)
Age: 70
End: 2020-01-08

## 2020-01-08 RX ORDER — ONDANSETRON 4 MG/1
4 TABLET ORAL
Qty: 42 | Refills: 0 | Status: COMPLETED | COMMUNITY
Start: 2020-01-08 | End: 2020-01-15

## 2020-01-08 RX ORDER — OXYCODONE 5 MG/1
5 TABLET ORAL
Qty: 30 | Refills: 0 | Status: COMPLETED | COMMUNITY
Start: 2020-01-08 | End: 2020-01-15

## 2020-01-09 ENCOUNTER — TRANSCRIPTION ENCOUNTER (OUTPATIENT)
Age: 70
End: 2020-01-09

## 2020-01-09 RX ORDER — SODIUM CHLORIDE 9 MG/ML
3 INJECTION INTRAMUSCULAR; INTRAVENOUS; SUBCUTANEOUS ONCE
Refills: 0 | Status: DISCONTINUED | OUTPATIENT
Start: 2020-01-10 | End: 2020-02-02

## 2020-01-09 NOTE — ED PROVIDER NOTE - CHPI ED SYMPTOMS NEG
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

## 2020-01-10 ENCOUNTER — APPOINTMENT (OUTPATIENT)
Dept: ORTHOPEDIC SURGERY | Facility: HOSPITAL | Age: 70
End: 2020-01-10

## 2020-01-10 ENCOUNTER — OUTPATIENT (OUTPATIENT)
Dept: OUTPATIENT SERVICES | Facility: HOSPITAL | Age: 70
LOS: 1 days | End: 2020-01-10
Payer: MEDICARE

## 2020-01-10 VITALS
OXYGEN SATURATION: 100 % | HEART RATE: 69 BPM | TEMPERATURE: 99 F | DIASTOLIC BLOOD PRESSURE: 77 MMHG | SYSTOLIC BLOOD PRESSURE: 130 MMHG | WEIGHT: 153 LBS | HEIGHT: 70 IN | RESPIRATION RATE: 16 BRPM

## 2020-01-10 VITALS
RESPIRATION RATE: 15 BRPM | TEMPERATURE: 98 F | HEART RATE: 77 BPM | DIASTOLIC BLOOD PRESSURE: 97 MMHG | SYSTOLIC BLOOD PRESSURE: 164 MMHG

## 2020-01-10 DIAGNOSIS — M75.101 UNSPECIFIED ROTATOR CUFF TEAR OR RUPTURE OF RIGHT SHOULDER, NOT SPECIFIED AS TRAUMATIC: ICD-10-CM

## 2020-01-10 DIAGNOSIS — M75.21 BICIPITAL TENDINITIS, RIGHT SHOULDER: ICD-10-CM

## 2020-01-10 DIAGNOSIS — S46.011A STRAIN OF MUSCLE(S) AND TENDON(S) OF THE ROTATOR CUFF OF RIGHT SHOULDER, INITIAL ENCOUNTER: ICD-10-CM

## 2020-01-10 PROCEDURE — 23430 REPAIR BICEPS TENDON: CPT | Mod: AS,RT

## 2020-01-10 PROCEDURE — 29827 SHO ARTHRS SRG RT8TR CUF RPR: CPT | Mod: RT

## 2020-01-10 PROCEDURE — C1713: CPT

## 2020-01-10 PROCEDURE — 23430 REPAIR BICEPS TENDON: CPT | Mod: RT

## 2020-01-10 PROCEDURE — 29823 SHO ARTHRS SRG XTNSV DBRDMT: CPT | Mod: AS,RT

## 2020-01-10 PROCEDURE — 29823 SHO ARTHRS SRG XTNSV DBRDMT: CPT | Mod: RT

## 2020-01-10 PROCEDURE — 29828 SHO ARTHRS SRG BICP TENODSIS: CPT | Mod: RT

## 2020-01-10 PROCEDURE — 29826 SHO ARTHRS SRG DECOMPRESSION: CPT | Mod: RT

## 2020-01-10 PROCEDURE — 29827 SHO ARTHRS SRG RT8TR CUF RPR: CPT | Mod: AS,RT

## 2020-01-10 RX ORDER — OXYCODONE HYDROCHLORIDE 5 MG/1
10 TABLET ORAL
Refills: 0 | Status: DISCONTINUED | OUTPATIENT
Start: 2020-01-10 | End: 2020-01-10

## 2020-01-10 RX ORDER — ACETAMINOPHEN 500 MG
1000 TABLET ORAL ONCE
Refills: 0 | Status: COMPLETED | OUTPATIENT
Start: 2020-01-10 | End: 2020-01-10

## 2020-01-10 RX ORDER — KETOROLAC TROMETHAMINE 30 MG/ML
30 SYRINGE (ML) INJECTION ONCE
Refills: 0 | Status: DISCONTINUED | OUTPATIENT
Start: 2020-01-10 | End: 2020-01-10

## 2020-01-10 RX ORDER — FENTANYL CITRATE 50 UG/ML
50 INJECTION INTRAVENOUS
Refills: 0 | Status: DISCONTINUED | OUTPATIENT
Start: 2020-01-10 | End: 2020-01-10

## 2020-01-10 RX ORDER — CEFAZOLIN SODIUM 1 G
2000 VIAL (EA) INJECTION ONCE
Refills: 0 | Status: DISCONTINUED | OUTPATIENT
Start: 2020-01-10 | End: 2020-01-10

## 2020-01-10 RX ORDER — ONDANSETRON 8 MG/1
4 TABLET, FILM COATED ORAL ONCE
Refills: 0 | Status: DISCONTINUED | OUTPATIENT
Start: 2020-01-10 | End: 2020-01-10

## 2020-01-10 RX ORDER — SODIUM CHLORIDE 9 MG/ML
1000 INJECTION, SOLUTION INTRAVENOUS
Refills: 0 | Status: DISCONTINUED | OUTPATIENT
Start: 2020-01-10 | End: 2020-01-10

## 2020-01-10 RX ORDER — CEFAZOLIN SODIUM 1 G
2 VIAL (EA) INJECTION
Qty: 0 | Refills: 0 | DISCHARGE
Start: 2020-01-10

## 2020-01-10 RX ORDER — OXYCODONE HYDROCHLORIDE 5 MG/1
5 TABLET ORAL
Refills: 0 | Status: DISCONTINUED | OUTPATIENT
Start: 2020-01-10 | End: 2020-01-10

## 2020-01-10 RX ADMIN — Medication 30 MILLIGRAM(S): at 11:08

## 2020-01-10 RX ADMIN — FENTANYL CITRATE 50 MICROGRAM(S): 50 INJECTION INTRAVENOUS at 11:48

## 2020-01-10 RX ADMIN — FENTANYL CITRATE 50 MICROGRAM(S): 50 INJECTION INTRAVENOUS at 11:33

## 2020-01-10 RX ADMIN — Medication 1000 MILLIGRAM(S): at 11:23

## 2020-01-10 RX ADMIN — FENTANYL CITRATE 50 MICROGRAM(S): 50 INJECTION INTRAVENOUS at 11:23

## 2020-01-10 RX ADMIN — Medication 400 MILLIGRAM(S): at 11:08

## 2020-01-10 RX ADMIN — SODIUM CHLORIDE 100 MILLILITER(S): 9 INJECTION, SOLUTION INTRAVENOUS at 11:23

## 2020-01-10 RX ADMIN — Medication 30 MILLIGRAM(S): at 11:23

## 2020-01-10 NOTE — ASU DISCHARGE PLAN (ADULT/PEDIATRIC) - CALL YOUR DOCTOR IF YOU HAVE ANY OF THE FOLLOWING:
Nausea and vomiting that does not stop/Numbness, tingling, color or temperature change to extremity/Pain not relieved by Medications Nausea and vomiting that does not stop/Pain not relieved by Medications/Numbness, tingling, color or temperature change to extremity/Bleeding that does not stop

## 2020-01-10 NOTE — H&P ADULT - NSHPPHYSICALEXAM_GEN_ALL_CORE
Physical Exam:  General: Well appearing, no acute distress, A&O  Neurologic: A&Ox3, No focal deficits  Head: NCAT without abrasions, lacerations, or ecchymosis to head, face, or scalp  Eyes: No scleral icterus, no gross abnormalities  Respiratory: Equal chest wall expansion bilaterally, no accessory muscle use  Lymphatic: No lymphadenopathy palpated  Skin: Warm and dry  Psychiatric: Normal mood and affect  Right Shoulder  ·	Inspection/Palpation: no tenderness, swelling , deformity at the SC joint with anterior subluxated clavicle which is chornic  ·	Range of Motion: no crepitus with ROM; Active FF 90; ER at side 35; IR to back pocket; Passive FF same  ·	Strength: forward elevation in scapular plane [4/5], internal rotation [4/5], external rotation [4/5], adduction [4/5] and abduction [4/5]  ·	Stability: no joint instability on provocative testing  ·	Tests: Crooks test positive, Neer positive, positive drop arm test secondary to pain, bear hug test positive, Napolean sign negative, cross arm adduction negative, lift off sign positive, hornblowers sign negative, speeds test negative, Yergason's test negative, no bicipital groove tenderness, Winter's Active Compression test negative, whipple test positive, bicep's load II test negative    Left Shoulder  ·	Inspection/Palpation: no tenderness, swelling or deformities  ·	Range of Motion: full and painless in all planes, no crepitus  ·	Strength: forward elevation in scapular plane 5/5, internal rotation 5/5, external rotation 5/5, adduction 5/5 and abduction 5/5  ·	Stability: no joint instability on provocative testing  Tests: Crooks test negative, Neer sign negative, negative drop arm test secondary to pain, bear hug test negative, Napolean sign negative, cross arm adduction negative, lift off sign positive, hornblowers sign negative, speeds test negative, Yergason's test negative, no bicipital groove tenderness, Winter's Active Compression test negative

## 2020-01-10 NOTE — ASU DISCHARGE PLAN (ADULT/PEDIATRIC) - CARE PROVIDER_API CALL
Yimi Tang (DO)  [facility]  [address]  [place] [address]  Phone: [phone]  Fax: [phone]  Follow Up Time:

## 2020-01-10 NOTE — BRIEF OPERATIVE NOTE - NSICDXBRIEFPROCEDURE_GEN_ALL_CORE_FT
PROCEDURES:  Decompression, shoulder, arthroscopic, with rotator cuff repair 10-Ramesh-2020 07:05:25  Yimi Tang

## 2020-01-10 NOTE — H&P ADULT - ASSESSMENT
Chris comes in for follow-up of his right shoulder. He states he feels improvement in function but still has significant pain in his shoulder. Exam shows significant weakness as well. He attempted conservative measures over the past 3 months with me and prior as well with only moderate improvements. I reviewed the MRI with both him and his wife today. After thorough discussion with them we discussed the alternatives of care as well as the advantages and disadvantages of surgical fixation. After thorough discussion the patient elects to defer all other conservative care and move forward with operative intervention. All risks, benefits and alternatives to the proposed surgical procedure, right shoulder arthroscopy with rotator cuff repair possible biceps tenodesis and subacromial decompression with acromioplasty, were discussed in great detail with the patient. Risks include but are not limited to pain, bleeding, infection, stiffness, , medical complications (including DVT, PE, MI), and risks of anesthesia. The patient expressed understanding and all questions were answered. The patient is electing to proceed, and will have the patient scheduled accordingly.

## 2020-01-10 NOTE — H&P ADULT - HISTORY OF PRESENT ILLNESS
DARIUS is a 69 year old male who presents today for right shoulder pain. His pain is sharp in nature. He denies history of trauma or injury to the right UE in the past. Patient sustained a fall on the tip of his shoulder while on a cruise several weeks ago. He endorsed immediate pain to his GH joint. While on the ship he obtained x-rays that were negative for fracture or dislocation. He was administered an IM antiinflammatory injection. He denies SC joint pain although he has a deformity present. 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     Currently, he has pain with arm abduction past 90 degrees. Patient reports crepitus with arm elevation. He endorses mild pain with reaching behind his head. He denies numbness/tingling to the UE. Patient takes Aleve PRN. He has not received prior treatment for this issue.       He states the symptoms are stable. Pain levels include a current pain level of 6/10, an average pain level of 6/10 and a maximum pain level of 8/10.   He states the symptoms seem to be constant.     Modifying factors -. lying down, activity in general. Relieving factors include relieved by nonsteroidal anti-inflammatory drugs. No relieving factors are noted. The injection offered him mild relief but now he is completely refractory to consrvative measures

## 2020-01-13 ENCOUNTER — APPOINTMENT (OUTPATIENT)
Dept: NEUROLOGY | Facility: CLINIC | Age: 70
End: 2020-01-13

## 2020-01-24 ENCOUNTER — APPOINTMENT (OUTPATIENT)
Dept: ORTHOPEDIC SURGERY | Facility: CLINIC | Age: 70
End: 2020-01-24
Payer: MEDICARE

## 2020-01-24 PROCEDURE — 73030 X-RAY EXAM OF SHOULDER: CPT | Mod: RT

## 2020-01-24 PROCEDURE — 99024 POSTOP FOLLOW-UP VISIT: CPT

## 2020-02-18 ENCOUNTER — APPOINTMENT (OUTPATIENT)
Dept: INTERNAL MEDICINE | Facility: CLINIC | Age: 70
End: 2020-02-18
Payer: MEDICARE

## 2020-02-18 VITALS
HEIGHT: 70 IN | SYSTOLIC BLOOD PRESSURE: 120 MMHG | BODY MASS INDEX: 22.05 KG/M2 | HEART RATE: 60 BPM | WEIGHT: 154 LBS | DIASTOLIC BLOOD PRESSURE: 80 MMHG | OXYGEN SATURATION: 97 %

## 2020-02-18 DIAGNOSIS — Z01.818 ENCOUNTER FOR OTHER PREPROCEDURAL EXAMINATION: ICD-10-CM

## 2020-02-18 DIAGNOSIS — Z01.812 ENCOUNTER FOR PREPROCEDURAL LABORATORY EXAMINATION: ICD-10-CM

## 2020-02-18 PROCEDURE — 36415 COLL VENOUS BLD VENIPUNCTURE: CPT

## 2020-02-18 PROCEDURE — 99213 OFFICE O/P EST LOW 20 MIN: CPT | Mod: 25

## 2020-02-18 RX ORDER — MEMANTINE HYDROCHLORIDE 5 MG-10 MG
28 X 5 MG & KIT ORAL
Qty: 1 | Refills: 0 | Status: DISCONTINUED | COMMUNITY
Start: 2019-11-04 | End: 2020-02-18

## 2020-02-18 NOTE — PHYSICAL EXAM
[] : no respiratory distress [General Appearance - In No Acute Distress] : in no acute distress [Heart Rate And Rhythm] : heart rate was normal and rhythm regular [Auscultation Breath Sounds / Voice Sounds] : lungs were clear to auscultation bilaterally [Respiration, Rhythm And Depth] : normal respiratory rhythm and effort [Affect] : the affect was normal [Mood] : the mood was normal

## 2020-02-18 NOTE — HISTORY OF PRESENT ILLNESS
[FreeTextEntry1] : Patient presents for followup on hypertension/hyperlipidemia/med check. Patient is currently fasting/no new issues.Patient is currently on quinapril for hypertension and on red yeast rice/lipitor for hyperlipidemia.\par -Status post right rotator cuff surgery, went well and currently doing PT

## 2020-02-18 NOTE — ASSESSMENT
[FreeTextEntry1] : Venipuncture done in our office, Labs sent out/ further recommendations will be made based on lab results. Patient advised to continue present medications with diet/exercise and specialist followup. RTO in aug for CP\par \par \par discussed shingrix/Prevnar/flu vaccine=declines\par colonoscopy was 8/16 and was normal-followup in 5years-Dr. Stevens\par specialists include\par 1. cardiology prn-Dr. Ruano\par 2. urologyQyear-Dr. Goldberg\par 3.dermatology q. year-Dr. Agustin\par 4.ophthalmology prn-Dr. Mcclelland\par 5.Neuro-Dr. Mina\par 6.Neuro-psych-Dr. Galaviz\par 7.Ortho-Dr. Tang\par 24-hour urine done 9/2019\par Hep C screening in past was negative\par Cath 7/2018\par MRI brain 7/2019\par \par \par \par \par

## 2020-02-20 LAB
ALBUMIN SERPL ELPH-MCNC: 4.5 G/DL
ALP BLD-CCNC: 71 U/L
ALT SERPL-CCNC: 22 U/L
ANION GAP SERPL CALC-SCNC: 16 MMOL/L
AST SERPL-CCNC: 21 U/L
BASOPHILS # BLD AUTO: 0.04 K/UL
BASOPHILS NFR BLD AUTO: 0.5 %
BILIRUB SERPL-MCNC: 0.9 MG/DL
BUN SERPL-MCNC: 21 MG/DL
CALCIUM SERPL-MCNC: 10.2 MG/DL
CHLORIDE SERPL-SCNC: 102 MMOL/L
CHOLEST SERPL-MCNC: 189 MG/DL
CHOLEST/HDLC SERPL: 2.3 RATIO
CO2 SERPL-SCNC: 24 MMOL/L
CREAT SERPL-MCNC: 1.18 MG/DL
EOSINOPHIL # BLD AUTO: 0.06 K/UL
EOSINOPHIL NFR BLD AUTO: 0.7 %
GLUCOSE SERPL-MCNC: 77 MG/DL
HCT VFR BLD CALC: 44.1 %
HDLC SERPL-MCNC: 83 MG/DL
HGB BLD-MCNC: 14.5 G/DL
IMM GRANULOCYTES NFR BLD AUTO: 0.2 %
LDLC SERPL CALC-MCNC: 93 MG/DL
LYMPHOCYTES # BLD AUTO: 1.67 K/UL
LYMPHOCYTES NFR BLD AUTO: 20 %
MAN DIFF?: NORMAL
MCHC RBC-ENTMCNC: 31.9 PG
MCHC RBC-ENTMCNC: 32.9 GM/DL
MCV RBC AUTO: 97.1 FL
MONOCYTES # BLD AUTO: 0.62 K/UL
MONOCYTES NFR BLD AUTO: 7.4 %
NEUTROPHILS # BLD AUTO: 5.96 K/UL
NEUTROPHILS NFR BLD AUTO: 71.2 %
PLATELET # BLD AUTO: 307 K/UL
POTASSIUM SERPL-SCNC: 4.3 MMOL/L
PROT SERPL-MCNC: 7 G/DL
RBC # BLD: 4.54 M/UL
RBC # FLD: 12.7 %
SODIUM SERPL-SCNC: 142 MMOL/L
TRIGL SERPL-MCNC: 63 MG/DL
WBC # FLD AUTO: 8.37 K/UL

## 2020-03-06 ENCOUNTER — APPOINTMENT (OUTPATIENT)
Dept: ORTHOPEDIC SURGERY | Facility: CLINIC | Age: 70
End: 2020-03-06
Payer: MEDICARE

## 2020-03-06 PROCEDURE — 99024 POSTOP FOLLOW-UP VISIT: CPT

## 2020-04-14 ENCOUNTER — APPOINTMENT (OUTPATIENT)
Dept: ORTHOPEDIC SURGERY | Facility: CLINIC | Age: 70
End: 2020-04-14
Payer: MEDICARE

## 2020-04-14 PROCEDURE — 99213 OFFICE O/P EST LOW 20 MIN: CPT | Mod: 95

## 2020-04-14 NOTE — PHYSICAL EXAM
[de-identified] : Physical Exam:\par General: Well appearing, no acute distress, A&O\par Neurologic: A&Ox3, No focal deficits\par Head: NCAT without abrasions, lacerations, or ecchymosis to head, face, or scalp\par Eyes: No scleral icterus, no gross abnormalities\par Respiratory: Equal chest wall expansion bilaterally, no accessory muscle use\par Skin: no erythema\par Psychiatric: Normal mood and affect\par \par Right shoulder: Forward flexion 160 degrees, external rotation 35 degrees, internal rotation to L5.  Abduction 140 degrees.  He does complain of some mild pain with terminal flexion and abduction.  Subjective weakness with isometric abduction forward flexion and external rotation against the wall.  Negative painful arc.  Negative drop arm side.  Negative Jennifer empty can test.  Negative Hornblower sign.  No external rotation lag noted.\par \par Left shoulder:Forward flexion 170 degrees, external rotation 45 degrees, internal rotation to L1.  Abduction 150 degrees.  Negative painful arc.  Negative drop arm side.  Negative Jennifer empty can test.  Negative Hornblower sign.  No external rotation lag noted.

## 2020-04-14 NOTE — HISTORY OF PRESENT ILLNESS
[Home] : at home, [unfilled] , at the time of the visit. 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  He is currently 13 weeks status post right shoulder arthroscopy with massive rotator cuff repair.  Overall he is feeling very well.  He denies pain in his shoulder.  He has almost complete range of motion of his right shoulder and only complains of pain with terminal forward flexion and abduction.  Is he does have subjective weakness although he feels his strength is improving daily.  He denies symptoms at night.  Denies inability to sleep.  He is not taking any medications.  At an average his pain is a 1 out of 10 with certain activities and 0 of 10 with no activity. [Pain Location] : pain [] : right shoulder [Stable] : stable [___ mths] : [unfilled] month(s) ago [1] : an average pain level of 1/10 [0] : a minimum pain level of 0/10 [2] : a maximum pain level of 2/10

## 2020-04-14 NOTE — DISCUSSION/SUMMARY
[de-identified] : Chris is a 69-year-old male status post right shoulder arthroscopy with rotator cuff repair.  Overall he is doing extremely well 3 months postop.  At this time given the COVID pandemic I recommend continued home exercise program.  One was given to him on the last visit.  He does have bands and a bar in order to perform these exercises.  He also has dumbbells.  He will continue to do home excise program until the COVID pandemic is resolved.  At that time we will see him back in the office and I will better assess where he has weakness.  This will dictate the necessity for further physical therapy versus continue home exercise program.  All questions were answered by both him and his wife and they agree with the above plan.

## 2020-04-17 ENCOUNTER — APPOINTMENT (OUTPATIENT)
Dept: ORTHOPEDIC SURGERY | Facility: CLINIC | Age: 70
End: 2020-04-17

## 2020-05-22 NOTE — H&P PST ADULT - NS NEC GEN PE MLT EXAM PC
No bruits; no thyromegaly or nodules
No bruits; no thyromegaly or nodules
Receive pt. in Intake room 12 alert and oriented x 4 presenting to the ER with complaints of abdominal pain. Pt. have a history of Bipolar and Major depressive disorder. Pt. stated " I have pain in the upper part of my abdomen and it goes to the right side". No nausea or vomiting at present, labs sent will continue to monitor.

## 2020-05-30 ENCOUNTER — RX RENEWAL (OUTPATIENT)
Age: 70
End: 2020-05-30

## 2020-06-02 ENCOUNTER — RX RENEWAL (OUTPATIENT)
Age: 70
End: 2020-06-02

## 2020-07-20 ENCOUNTER — RX RENEWAL (OUTPATIENT)
Age: 70
End: 2020-07-20

## 2020-07-22 ENCOUNTER — RX RENEWAL (OUTPATIENT)
Age: 70
End: 2020-07-22

## 2020-07-23 ENCOUNTER — RX RENEWAL (OUTPATIENT)
Age: 70
End: 2020-07-23

## 2020-09-01 ENCOUNTER — NON-APPOINTMENT (OUTPATIENT)
Age: 70
End: 2020-09-01

## 2020-09-01 ENCOUNTER — APPOINTMENT (OUTPATIENT)
Dept: INTERNAL MEDICINE | Facility: CLINIC | Age: 70
End: 2020-09-01
Payer: MEDICARE

## 2020-09-01 VITALS
TEMPERATURE: 97.9 F | RESPIRATION RATE: 14 BRPM | HEIGHT: 70 IN | WEIGHT: 153 LBS | HEART RATE: 65 BPM | SYSTOLIC BLOOD PRESSURE: 120 MMHG | DIASTOLIC BLOOD PRESSURE: 70 MMHG | BODY MASS INDEX: 21.9 KG/M2

## 2020-09-01 PROCEDURE — 36415 COLL VENOUS BLD VENIPUNCTURE: CPT

## 2020-09-01 PROCEDURE — 93000 ELECTROCARDIOGRAM COMPLETE: CPT

## 2020-09-01 PROCEDURE — G0442 ANNUAL ALCOHOL SCREEN 15 MIN: CPT | Mod: 59

## 2020-09-01 PROCEDURE — G0439: CPT

## 2020-09-02 LAB
ALBUMIN SERPL ELPH-MCNC: 4.4 G/DL
ALP BLD-CCNC: 68 U/L
ALT SERPL-CCNC: 23 U/L
ANION GAP SERPL CALC-SCNC: 13 MMOL/L
AST SERPL-CCNC: 27 U/L
BASOPHILS # BLD AUTO: 0.04 K/UL
BASOPHILS NFR BLD AUTO: 0.5 %
BILIRUB SERPL-MCNC: 1 MG/DL
BUN SERPL-MCNC: 18 MG/DL
CALCIUM SERPL-MCNC: 9.8 MG/DL
CHLORIDE SERPL-SCNC: 104 MMOL/L
CHOLEST SERPL-MCNC: 179 MG/DL
CHOLEST/HDLC SERPL: 2 RATIO
CO2 SERPL-SCNC: 23 MMOL/L
CREAT SERPL-MCNC: 1.2 MG/DL
EOSINOPHIL # BLD AUTO: 0.11 K/UL
EOSINOPHIL NFR BLD AUTO: 1.2 %
GLUCOSE SERPL-MCNC: 93 MG/DL
HCT VFR BLD CALC: 44.8 %
HDLC SERPL-MCNC: 89 MG/DL
HGB BLD-MCNC: 14.6 G/DL
IMM GRANULOCYTES NFR BLD AUTO: 0.2 %
LDLC SERPL CALC-MCNC: 80 MG/DL
LYMPHOCYTES # BLD AUTO: 1.45 K/UL
LYMPHOCYTES NFR BLD AUTO: 16.4 %
MAN DIFF?: NORMAL
MCHC RBC-ENTMCNC: 32.3 PG
MCHC RBC-ENTMCNC: 32.6 GM/DL
MCV RBC AUTO: 99.1 FL
MONOCYTES # BLD AUTO: 0.58 K/UL
MONOCYTES NFR BLD AUTO: 6.6 %
NEUTROPHILS # BLD AUTO: 6.62 K/UL
NEUTROPHILS NFR BLD AUTO: 75.1 %
PLATELET # BLD AUTO: 327 K/UL
POTASSIUM SERPL-SCNC: 4.7 MMOL/L
PROT SERPL-MCNC: 6.3 G/DL
PSA SERPL-MCNC: 3.71 NG/ML
RBC # BLD: 4.52 M/UL
RBC # FLD: 13 %
SARS-COV-2 IGG SERPL IA-ACNC: <3.8 AU/ML
SARS-COV-2 IGG SERPL QL IA: NEGATIVE
SODIUM SERPL-SCNC: 140 MMOL/L
TRIGL SERPL-MCNC: 54 MG/DL
WBC # FLD AUTO: 8.82 K/UL

## 2020-09-02 NOTE — HISTORY OF PRESENT ILLNESS
[FreeTextEntry1] : 69-year-old male whose only chronic medical issues hypertension for which he takes Quinipril and BPH with erectile dysfunction for which he follows with urologist Dr. Santiago presents for his yearly physical.\par \par The patient does run a mildly elevated creatinine with 24-hour urine creatinine clearance September 2019 = 81 mL/minute.\par \par July 2018 the patient had chest pain while at the gym therefore went to the ER with evaluation showing negative cardiac enzymes.\par Cardiac catheterization was normal with no CAD.\par No issues since.\par \par Patient with BPH and elevated PSA following with urology last seen March 2019. He states he may or may not followup with him here\par \par He follows a good diet and exercises regularly and his weight is stable.\par \par the patient has been having memory difficulties for about 3 years and saw neurology last year with diagnosis of memory  which was mild.\par Workup included blood work which was normal an MRI showing microvascular changes.\par the patient started on Memantine which he has been on for about a year and feels his memory is about the same without any improvement or worsening.\par Also on 81 mg aspirin daily and atorvastatin.\par \par The patient relates that his wife complains that he gets too anxious and the patient agrees that this occurs. She feels the patient should be treated. No depression.\par \par  the patient had right shoulder rotator cuff tear and arthroscopic surgery January 2020 with good results.

## 2020-09-02 NOTE — REVIEW OF SYSTEMS
[Negative] : Heme/Lymph [see HPI] : see HPI [Anxiety] : anxiety [Depression] : no depression [Sleep Disturbances] : no sleep disturbances [Suicidal] : not suicidal [Change In Personality] : no personality change [Emotional Problems] : no emotional problems

## 2020-09-02 NOTE — ASSESSMENT
[FreeTextEntry1] : 69-year-old male currently medically stable with controlled hypertension on present medications without any indication of active medical issues.\par \par Patient with mild memory dysfunction status post neurology evaluation continuing Memantine, ASA and atorvastatin.\par patient told to followup with neurology.\par \par The patient is status post substernal chest pain July 2018 with cardiac catheterization improving he has no CAD.\par \par Patient is to continue present medications and supplements along with his good diet and exercise.\par \par Patient with chronic/increased anxiety and agrees to treatment therefore start BuSpar 5 mg b.i.d.\par \par Colonoscopy August 2016 with followup in 5 years\par Urology followup recommended\par \par Prevnar vaccine and shingles vaccine discussed. Patient again declines\par also declines influenza vaccine\par Venipuncture done today in the office\par Followup in 3-4 weeks to assess BuSpar

## 2020-09-02 NOTE — COUNSELING
[None] : None [Good understanding] : Patient has a good understanding of lifestyle changes and the steps needed to achieve self management goals [de-identified] : continue diet and exercise

## 2020-09-02 NOTE — HEALTH RISK ASSESSMENT
[Good] : ~his/her~ current health as good [Very Good] : ~his/her~  mood as very good [Yes] : Yes [2 - 3 times a week (3 pts)] : 2 - 3  times a week (3 points) [1 or 2 (0 pts)] : 1 or 2 (0 points) [Never (0 pts)] : Never (0 points) [No] : In the past 12 months have you used drugs other than those required for medical reasons? No [No falls in past year] : Patient reported no falls in the past year [0] : 2) Feeling down, depressed, or hopeless: Not at all (0) [None] : None [With Significant Other] : lives with significant other [# of Members in Household ___] :  household currently consist of [unfilled] member(s) [Retired] : retired [College] : College [] :  [# Of Children ___] : has [unfilled] children [Sexually Active] : sexually active [Feels Safe at Home] : Feels safe at home [Fully functional (bathing, dressing, toileting, transferring, walking, feeding)] : Fully functional (bathing, dressing, toileting, transferring, walking, feeding) [Fully functional (using the telephone, shopping, preparing meals, housekeeping, doing laundry, using] : Fully functional and needs no help or supervision to perform IADLs (using the telephone, shopping, preparing meals, housekeeping, doing laundry, using transportation, managing medications and managing finances) [Smoke Detector] : smoke detector [Carbon Monoxide Detector] : carbon monoxide detector [Seat Belt] :  uses seat belt [Reviewed no changes] : Reviewed no changes [Discussed at today's visit] : Advance Directives Discussed at today's visit [Designated Healthcare Proxy] : Designated healthcare proxy [Name: ___] : Health Care Proxy's Name: [unfilled]  [] : No [de-identified] : good [Audit-CScore] : 3 [de-identified] : exercises [[id number]] : 0 [Change in mental status noted] : No change in mental status noted [Reports changes in hearing] : Reports no changes in hearing [Guns at Home] : no guns at home [Reports changes in dental health] : Reports no changes in dental health [Reports changes in vision] : Reports no changes in vision [FreeTextEntry2] :  [de-identified] : recommended sunscreen [Sunscreen] : does not use sunscreen [AdvancecareDate] : 09/20

## 2020-09-02 NOTE — PHYSICAL EXAM
[General Appearance - Alert] : alert [General Appearance - In No Acute Distress] : in no acute distress [Sclera] : the sclera and conjunctiva were normal [PERRL With Normal Accommodation] : pupils were equal in size, round, and reactive to light [Extraocular Movements] : extraocular movements were intact [Outer Ear] : the ears and nose were normal in appearance [Oropharynx] : the oropharynx was normal [Neck Appearance] : the appearance of the neck was normal [Neck Cervical Mass (___cm)] : no neck mass was observed [Jugular Venous Distention Increased] : there was no jugular-venous distention [Thyroid Diffuse Enlargement] : the thyroid was not enlarged [Thyroid Nodule] : there were no palpable thyroid nodules [Auscultation Breath Sounds / Voice Sounds] : lungs were clear to auscultation bilaterally [Heart Rate And Rhythm] : heart rate was normal and rhythm regular [Heart Sounds] : normal S1 and S2 [Heart Sounds Gallop] : no gallops [Murmurs] : no murmurs [Heart Sounds Pericardial Friction Rub] : no pericardial rub [Arterial Pulses Carotid] : carotid pulses were normal with no bruits [Abdominal Aorta] : the abdominal aorta was normal [Arterial Pulses Femoral] : femoral pulses were normal without bruits [Full Pulse] : the pedal pulses are present [Edema] : there was no peripheral edema [Veins - Varicosity Changes] : there were no varicosital changes [Bowel Sounds] : normal bowel sounds [Abdomen Soft] : soft [Abdomen Tenderness] : non-tender [Abdomen Mass (___ Cm)] : no abdominal mass palpated [Cervical Lymph Nodes Enlarged Posterior Bilaterally] : posterior cervical [Cervical Lymph Nodes Enlarged Anterior Bilaterally] : anterior cervical [Supraclavicular Lymph Nodes Enlarged Bilaterally] : supraclavicular [Axillary Lymph Nodes Enlarged Bilaterally] : axillary [Femoral Lymph Nodes Enlarged Bilaterally] : femoral [Inguinal Lymph Nodes Enlarged Bilaterally] : inguinal [No Spinal Tenderness] : no spinal tenderness [Abnormal Walk] : normal gait [Nail Clubbing] : no clubbing  or cyanosis of the fingernails [Musculoskeletal - Swelling] : no joint swelling seen [Motor Tone] : muscle strength and tone were normal [Skin Color & Pigmentation] : normal skin color and pigmentation [Skin Turgor] : normal skin turgor [] : no rash [Cranial Nerves] : cranial nerves 2-12 were intact [No Focal Deficits] : no focal deficits [Oriented To Time, Place, And Person] : oriented to person, place, and time [Impaired Insight] : insight and judgment were intact [Affect] : the affect was normal [Patient Refused] : rectal exam was refused by the patient [FreeTextEntry1] : By urology [Over the Past 2 Weeks, Have You Felt Down, Depressed, or Hopeless?] : 1.) Over the past 2 weeks, have you felt down, depressed, or hopeless? No [Over the Past 2 Weeks, Have You Felt Little Interest or Pleasure Doing Things?] : 2.) Over the past 2 weeks, have you felt little interest or pleasure doing things? No

## 2020-09-22 ENCOUNTER — APPOINTMENT (OUTPATIENT)
Dept: INTERNAL MEDICINE | Facility: CLINIC | Age: 70
End: 2020-09-22

## 2020-09-24 ENCOUNTER — RX CHANGE (OUTPATIENT)
Age: 70
End: 2020-09-24

## 2020-09-28 ENCOUNTER — RX CHANGE (OUTPATIENT)
Age: 70
End: 2020-09-28

## 2020-09-29 ENCOUNTER — APPOINTMENT (OUTPATIENT)
Dept: INTERNAL MEDICINE | Facility: CLINIC | Age: 70
End: 2020-09-29
Payer: MEDICARE

## 2020-09-29 VITALS
RESPIRATION RATE: 14 BRPM | TEMPERATURE: 97.7 F | BODY MASS INDEX: 22.19 KG/M2 | HEART RATE: 71 BPM | SYSTOLIC BLOOD PRESSURE: 120 MMHG | HEIGHT: 70 IN | DIASTOLIC BLOOD PRESSURE: 80 MMHG | WEIGHT: 155 LBS | OXYGEN SATURATION: 98 %

## 2020-09-29 PROCEDURE — 99213 OFFICE O/P EST LOW 20 MIN: CPT | Mod: 25

## 2020-09-29 PROCEDURE — 36415 COLL VENOUS BLD VENIPUNCTURE: CPT

## 2020-09-29 RX ORDER — BUSPIRONE HYDROCHLORIDE 5 MG/1
5 TABLET ORAL TWICE DAILY
Qty: 60 | Refills: 2 | Status: DISCONTINUED | COMMUNITY
Start: 2020-09-01 | End: 2020-09-29

## 2020-09-29 NOTE — PHYSICAL EXAM
[No Acute Distress] : no acute distress [Well-Appearing] : well-appearing [No Respiratory Distress] : no respiratory distress  [No Accessory Muscle Use] : no accessory muscle use [Clear to Auscultation] : lungs were clear to auscultation bilaterally [Normal Rate] : normal rate  [Regular Rhythm] : with a regular rhythm [Normal Gait] : normal gait [Normal Affect] : the affect was normal

## 2020-09-29 NOTE — ASSESSMENT
[FreeTextEntry1] : chronic anxiety currently on BuSpar 5 mg twice a day without benefit and with no side effects.\par Plan is to increase BuSpar to 7.5 mg twice a day.\par Patient and his wife instructed to call me in 3-4 weeks and inform me of results.\par otherwise followup in 3-4 months.\par \par Followup with urology as scheduled.

## 2020-09-29 NOTE — HISTORY OF PRESENT ILLNESS
[FreeTextEntry1] : Followup anxiety on new medication and elevated PSA. [de-identified] : The patient was seen September 1, 2020 for yearly physical with continued but increased anxiety and in discussion with the patient's wife anxiety was getting in the way of his normal life.\par The patient was quite reluctant for it to be treated but did agree therefore since has been on BuSpar 5 mg twice a day.\par The patient states she's had no side effects but it hasn't helped.\par I also called his wife who stated the same.\par \par Also found patient's PSA was higher at 3.7 with results faxed to patient's urologistand recommended patient followup with him.\par He did followup with urology and the wife states PSA was repeated and was lower at 3.5 with plan followup in 6 months.

## 2020-10-23 ENCOUNTER — RX CHANGE (OUTPATIENT)
Age: 70
End: 2020-10-23

## 2020-10-28 ENCOUNTER — RX CHANGE (OUTPATIENT)
Age: 70
End: 2020-10-28

## 2020-10-28 ENCOUNTER — RX RENEWAL (OUTPATIENT)
Age: 70
End: 2020-10-28

## 2020-11-10 NOTE — ED ADULT NURSE NOTE - DISCHARGE DATE/TIME
Subjective:   Maria Esther Hector is a 39 y.o. female who presents for Cough (runny nose, sore throat, exposed to someone positive with Covid x 2 days)      HPI  39 y.o. female presents to urgent care with new problem to provider of cough, mild sore throat, no runny nose, headache, body ache, and chills onset about 2 days ago.  Patient presents with her sons who are being evaluated in urgent care today for similar symptoms.  She reports confirmed exposure to COVID-19.  She denies fevers, shortness of breath, or chest pain.  She denies history of chronic lung disease. Denies other associated aggravating or alleviating factors.     Review of Systems   Constitutional: Positive for chills and malaise/fatigue. Negative for fever.   HENT: Positive for congestion and sore throat. Negative for ear pain.    Eyes: Negative for pain, discharge and redness.   Respiratory: Positive for cough and sputum production. Negative for shortness of breath and wheezing.    Cardiovascular: Negative for chest pain and palpitations.   Gastrointestinal: Negative for abdominal pain, diarrhea, nausea and vomiting.   Musculoskeletal: Positive for myalgias. Negative for neck pain.   Skin: Negative for rash.   Neurological: Positive for headaches. Negative for dizziness.   Endo/Heme/Allergies: Negative for environmental allergies.   All other systems reviewed and are negative.      Patient Active Problem List   Diagnosis   • Indication for care in labor or delivery     Past Surgical History:   Procedure Laterality Date   • CARPAL TUNNEL ENDOSCOPIC Left 12/20/2017    Procedure: CARPAL TUNNEL ENDOSCOPIC;  Surgeon: Alessandro Chaney M.D.;  Location: SURGERY SAME DAY [facility];  Service: Orthopedics   • TRIGGER FINGER RELEASE Left 12/20/2017    Procedure: TRIGGER FINGER RELEASE/LONG;  Surgeon: Alessandro Chaney M.D.;  Location: SURGERY SAME DAY [facility];  Service: Orthopedics   • GANGLION EXCISION Left 12/20/2017    Procedure: GANGLION  "EXCISION/PULLY;  Surgeon: Alessandro Chaney M.D.;  Location: SURGERY SAME DAY [facility];  Service: Orthopedics   • OTHER      kidney     Social History     Tobacco Use   • Smoking status: Never Smoker   • Smokeless tobacco: Never Used   Substance Use Topics   • Alcohol use: Not Currently   • Drug use: No      History reviewed. No pertinent family history.   (Allergies, Medications, & Tobacco/Substance Use were reconciled by the Medical Assistant and reviewed by myself. The family history is prepopulated)     Objective:     /70 (BP Location: Right arm, Patient Position: Sitting, BP Cuff Size: Adult)   Pulse 64   Temp 36.7 °C (98.1 °F) (Temporal)   Resp 16   Ht 1.702 m (5' 7\")   Wt 65.8 kg (145 lb)   SpO2 98%   BMI 22.71 kg/m²     Physical Exam  Vitals signs reviewed.   Constitutional:       General: She is not in acute distress.     Appearance: Normal appearance. She is well-developed. She is not ill-appearing or diaphoretic.   HENT:      Head: Normocephalic and atraumatic.      Nose: Congestion present. No rhinorrhea.      Mouth/Throat:      Mouth: Mucous membranes are moist.      Pharynx: Oropharynx is clear. No oropharyngeal exudate or posterior oropharyngeal erythema.   Eyes:      Conjunctiva/sclera: Conjunctivae normal.   Neck:      Musculoskeletal: Normal range of motion and neck supple.      Comments: Minimal cervical adenopathy  Cardiovascular:      Rate and Rhythm: Normal rate and regular rhythm.      Heart sounds: Normal heart sounds.   Pulmonary:      Effort: Pulmonary effort is normal. No respiratory distress.      Breath sounds: Normal breath sounds. No wheezing, rhonchi or rales.   Musculoskeletal: Normal range of motion.   Lymphadenopathy:      Cervical: Cervical adenopathy present.   Skin:     General: Skin is warm and dry.   Neurological:      General: No focal deficit present.      Mental Status: She is alert and oriented to person, place, and time.   Psychiatric:         Mood and " Affect: Mood normal.         Behavior: Behavior normal.         Thought Content: Thought content normal.         Judgment: Judgment normal.         Assessment/Plan:     1. Cough with exposure to COVID-19 virus  COVID/SARS COV-2 PCR   2. Pharyngitis, unspecified etiology  COVID/SARS COV-2 PCR     Patient instructed to self-isolate/quarantine per CDC guidelines.  I will follow-up pending COVID-19 testing. Discussed with patient may obtain hard copy of results on [facility].   Advised patient symptoms are most likely viral in etiology. Increased fluids and rest. Discussed use of OTC cough and cold medication and Tylenol/Motrin for symptomatic relief.  Return for reevaluation or proceed to ED if symptoms persist or worsen. Supportive care, differential diagnoses, and indications for immediate follow-up discussed with patient. Patient should to proceed to ED for development of symptoms including but not limited to shortness of breath breath, difficulty breathing, or worsening symptoms not manageable at home.   The patient demonstrated a good understanding and agreed with the treatment plan and has no further questions regarding care.   Vital signs stable, patient in no acute respiratory distress.  Discussed with patient at length importance of communal effort to help decrease the infection rate of COVID 19. Patient advised to avoid large gatherings of people and practice good hand hygiene and respiratory precautions. COVID-19 discharge instructions and CDC guidelines provided to patient in AVS.      This patient is evaluated under [facility] isolation protocols in urgent care.  Out of an abundance of caution I am wearing a N95 mask, protective eye gear, gloves and gown through all interaction with patient.    Please note that this dictation was created using voice recognition software. I have made a reasonable attempt to correct obvious errors, but I expect that there are errors of grammar and possibly content that I did not  discover before finalizing the note.    This note was electronically signed by Bozena Dubois PA-C   27-Jul-2018 12:38

## 2020-11-11 ENCOUNTER — APPOINTMENT (OUTPATIENT)
Dept: DERMATOLOGY | Facility: CLINIC | Age: 70
End: 2020-11-11

## 2020-12-23 ENCOUNTER — RX RENEWAL (OUTPATIENT)
Age: 70
End: 2020-12-23

## 2021-01-18 ENCOUNTER — APPOINTMENT (OUTPATIENT)
Dept: INTERNAL MEDICINE | Facility: CLINIC | Age: 71
End: 2021-01-18
Payer: MEDICARE

## 2021-01-18 VITALS
HEIGHT: 70 IN | TEMPERATURE: 97.8 F | RESPIRATION RATE: 13 BRPM | WEIGHT: 155 LBS | SYSTOLIC BLOOD PRESSURE: 120 MMHG | DIASTOLIC BLOOD PRESSURE: 80 MMHG | HEART RATE: 78 BPM | BODY MASS INDEX: 22.19 KG/M2 | OXYGEN SATURATION: 98 %

## 2021-01-18 LAB
ALBUMIN SERPL ELPH-MCNC: 4.7 G/DL
ALP BLD-CCNC: 76 U/L
ALT SERPL-CCNC: 23 U/L
ANION GAP SERPL CALC-SCNC: 13 MMOL/L
AST SERPL-CCNC: 23 U/L
BASOPHILS # BLD AUTO: 0.04 K/UL
BASOPHILS NFR BLD AUTO: 0.6 %
BILIRUB SERPL-MCNC: 0.5 MG/DL
BUN SERPL-MCNC: 19 MG/DL
CALCIUM SERPL-MCNC: 9.4 MG/DL
CHLORIDE SERPL-SCNC: 101 MMOL/L
CHOLEST SERPL-MCNC: 243 MG/DL
CO2 SERPL-SCNC: 26 MMOL/L
CREAT SERPL-MCNC: 1.34 MG/DL
EOSINOPHIL # BLD AUTO: 0.06 K/UL
EOSINOPHIL NFR BLD AUTO: 0.9 %
GLUCOSE SERPL-MCNC: 91 MG/DL
HCT VFR BLD CALC: 49.3 %
HDLC SERPL-MCNC: 102 MG/DL
HGB BLD-MCNC: 16.2 G/DL
IMM GRANULOCYTES NFR BLD AUTO: 0.3 %
LDLC SERPL CALC-MCNC: 131 MG/DL
LYMPHOCYTES # BLD AUTO: 1.12 K/UL
LYMPHOCYTES NFR BLD AUTO: 16.8 %
MAN DIFF?: NORMAL
MCHC RBC-ENTMCNC: 31.8 PG
MCHC RBC-ENTMCNC: 32.9 GM/DL
MCV RBC AUTO: 96.9 FL
MONOCYTES # BLD AUTO: 0.57 K/UL
MONOCYTES NFR BLD AUTO: 8.5 %
NEUTROPHILS # BLD AUTO: 4.86 K/UL
NEUTROPHILS NFR BLD AUTO: 72.9 %
NONHDLC SERPL-MCNC: 141 MG/DL
PLATELET # BLD AUTO: 291 K/UL
POTASSIUM SERPL-SCNC: 5.3 MMOL/L
PROT SERPL-MCNC: 7.1 G/DL
RBC # BLD: 5.09 M/UL
RBC # FLD: 12.4 %
SODIUM SERPL-SCNC: 140 MMOL/L
TRIGL SERPL-MCNC: 49 MG/DL
WBC # FLD AUTO: 6.67 K/UL

## 2021-01-18 PROCEDURE — 99213 OFFICE O/P EST LOW 20 MIN: CPT | Mod: 25

## 2021-01-18 PROCEDURE — 90732 PPSV23 VACC 2 YRS+ SUBQ/IM: CPT

## 2021-01-18 PROCEDURE — G0009: CPT

## 2021-01-18 PROCEDURE — 36415 COLL VENOUS BLD VENIPUNCTURE: CPT

## 2021-01-18 NOTE — ASSESSMENT
[FreeTextEntry1] : Pneumococcal vaccine given without reaction.Venipuncture done in our office, Labs sent out/ further recommendations will be made based on lab results. Patient advised to continue present medications with diet/exercise and specialist followup. RTO in 4months\par \par \par discussed shingrix/covid vaccine\par colonoscopy was 8/16 and was normal-followup in 5years-Dr. Stevens\par specialists include\par 1. cardiology prn-Dr. Ruano\par 2. urologyQyear-Dr. Goldberg\par 3.dermatology q. year-Dr. Agustin\par 4.ophthalmology prn-Dr. Mcclelland\par 5.Neuro-Dr. Mina\par 6.Neuro-psych-Dr. Galaviz\par 7.Ortho-Dr. Tang\par 24-hour urine done 9/2019=monitor serum creatinine\par Hep C screening in past was negative\par Cath 7/2018\par MRI brain 7/2019\par \par \par \par \par

## 2021-01-18 NOTE — HISTORY OF PRESENT ILLNESS
[FreeTextEntry1] : Patient presents for followup on hypertension/hyperlipidemia/med check. Patient is currently fasting/no new issues.Patient is currently on quinapril for hypertension and on red yeast rice/lipitor for hyperlipidemia.\par -buspar was increased last visit, pt reports his anxiety is better

## 2021-01-25 ENCOUNTER — RX RENEWAL (OUTPATIENT)
Age: 71
End: 2021-01-25

## 2021-03-04 ENCOUNTER — APPOINTMENT (OUTPATIENT)
Dept: ORTHOPEDIC SURGERY | Facility: CLINIC | Age: 71
End: 2021-03-04
Payer: MEDICARE

## 2021-03-04 DIAGNOSIS — M75.21 BICIPITAL TENDINITIS, RIGHT SHOULDER: ICD-10-CM

## 2021-03-04 PROCEDURE — 99214 OFFICE O/P EST MOD 30 MIN: CPT

## 2021-03-07 NOTE — HISTORY OF PRESENT ILLNESS
[de-identified] : DARIUS is a 70 year male who presents today for follow up for 1 year status post right shoulder arthroscopy with massive rotator cuff repair.  Overall he is feeling very well.  He denies pain in his shoulder.  He has almost complete range of motion of his right shoulder and only complains of pain with terminal forward flexion and abduction.  Is he does have subjective weakness although he feels his strength is improving daily.  He denies symptoms at night.  Denies inability to sleep.  He is not taking any medications.  At an average his pain is a 1 out of 10 with certain activities and 0 of 10 with no activity.

## 2021-03-07 NOTE — PHYSICAL EXAM
[de-identified] : Physical Exam:\par General: Well appearing, no acute distress, A&O\par Neurologic: A&Ox3, No focal deficits\par Head: NCAT without abrasions, lacerations, or ecchymosis to head, face, or scalp\par Eyes: No scleral icterus, no gross abnormalities\par Respiratory: Equal chest wall expansion bilaterally, no accessory muscle use\par Skin: no erythema\par Psychiatric: Normal mood and affect\par \par Right shoulder: Forward flexion 160 degrees, external rotation 35 degrees, internal rotation to L5.  Abduction 140 degrees.  He does complain of some mild pain with terminal flexion and abduction.  Subjective weakness with isometric abduction forward flexion and external rotation against the wall.  Negative painful arc.  Negative drop arm side.  Negative Jennifer empty can test.  Negative Hornblower sign.  No external rotation lag noted.\par \par Left shoulder:Forward flexion 170 degrees, external rotation 45 degrees, internal rotation to L1.  Abduction 150 degrees.  Negative painful arc.  Negative drop arm side.  Negative Jennifer empty can test.  Negative Hornblower sign.  No external rotation lag noted.

## 2021-03-07 NOTE — DISCUSSION/SUMMARY
[de-identified] : Chris is a 69-year-old male status post right shoulder arthroscopy with rotator cuff repair.  Overall he is doing extremely well 12 months postop.  At this time given the COVID pandemic I recommend continued home exercise program.  One was given to him on the last visit.  He does have bands and a bar in order to perform these exercises.  He also has dumbbells.  He will continue to do home excise program until the COVID pandemic is resolved.  We will see him at the 2 year chan following his surgery. All questions were answered by both him and his wife and they agree with the above plan.]

## 2021-03-25 ENCOUNTER — RX CHANGE (OUTPATIENT)
Age: 71
End: 2021-03-25

## 2021-04-12 ENCOUNTER — RX RENEWAL (OUTPATIENT)
Age: 71
End: 2021-04-12

## 2021-04-15 ENCOUNTER — NON-APPOINTMENT (OUTPATIENT)
Age: 71
End: 2021-04-15

## 2021-04-15 ENCOUNTER — APPOINTMENT (OUTPATIENT)
Dept: CARDIOLOGY | Facility: CLINIC | Age: 71
End: 2021-04-15
Payer: MEDICARE

## 2021-04-15 VITALS
HEIGHT: 70 IN | RESPIRATION RATE: 16 BRPM | SYSTOLIC BLOOD PRESSURE: 142 MMHG | BODY MASS INDEX: 23.05 KG/M2 | DIASTOLIC BLOOD PRESSURE: 72 MMHG | WEIGHT: 161 LBS | HEART RATE: 70 BPM | TEMPERATURE: 97.9 F | OXYGEN SATURATION: 99 %

## 2021-04-15 VITALS — DIASTOLIC BLOOD PRESSURE: 88 MMHG | SYSTOLIC BLOOD PRESSURE: 124 MMHG

## 2021-04-15 DIAGNOSIS — I65.23 OCCLUSION AND STENOSIS OF BILATERAL CAROTID ARTERIES: ICD-10-CM

## 2021-04-15 PROCEDURE — 99214 OFFICE O/P EST MOD 30 MIN: CPT

## 2021-04-15 PROCEDURE — 93000 ELECTROCARDIOGRAM COMPLETE: CPT

## 2021-04-15 RX ORDER — ASPIRIN ENTERIC COATED TABLETS 81 MG 81 MG/1
81 TABLET, DELAYED RELEASE ORAL
Qty: 30 | Refills: 0 | Status: DISCONTINUED | COMMUNITY
Start: 2020-01-06 | End: 2021-04-15

## 2021-04-17 PROBLEM — I65.23 BILATERAL CAROTID ARTERY STENOSIS: Status: ACTIVE | Noted: 2021-04-15

## 2021-04-17 NOTE — REASON FOR VISIT
[Consultation] : a consultation regarding [FreeTextEntry2] : based on a recommendation from Dr Guzmán.

## 2021-04-17 NOTE — HISTORY OF PRESENT ILLNESS
[FreeTextEntry1] : 70-year-old male who is here for cardiology evaluation due to his risk factors of hypertension and hyperlipidemia.  I had seen him in the past and presented to the hospital about 4 years ago with chest discomfort.  At that time to perform cardiac catheterization which demonstrated normal coronary arteries.\par He is not retired and has some memory difficulties.  Is followed by neurologist.  He exercises on a daily basis without having any chest pain or breathing difficulties.  There is no complaint of palpitations, syncope or presyncope.  He is compliant with his medications.  \par \par EKG with normal sinus rhythm heart rate of 67 bpm, normal axis and intervals, no ST-T changes.\par

## 2021-04-17 NOTE — ASSESSMENT
[FreeTextEntry1] : This is a very pleasant 70-year-old gentleman accompanied by his wife Desirae.  Patient has no acute cardiac symptoms.  Prior imaging had indicated presence of bilateral carotid artery plaque.\par He is on quinapril, blood pressure at home and here are within normal limit.  He will continue with the medication.\par Is on medication for hyperlipidemia most recent blood work from January 2021 shows LDL to be 131 mg/dL with total cholesterol of 243 mg/dL.  No side effects from Zetia.\par Echocardiogram is ordered due to hypertension as well as a murmur heard on exam.\par He had a cardiac cath in 2018 with normal coronaries\par Carotid duplex with history of carotid plaque\par Follow-up with neurologist with memory difficulties.\par Advised to follow-up with Dr. Guzmán closely.\par patient was advised to see us in 6 months if stable and certainly earlier with any new symptoms.\par \par \par

## 2021-04-17 NOTE — REVIEW OF SYSTEMS
[Recent Weight Gain (___ Lbs)] : no recent weight gain [Feeling Fatigued] : not feeling fatigued [Recent Weight Loss (___ Lbs)] : no recent weight loss [Seeing Double (Diplopia)] : no diplopia [Eyeglasses] : currently wearing eyeglasses [Earache] : no earache [Mouth Sores] : no mouth sores [Sore Throat] : no sore throat [Shortness Of Breath] : no shortness of breath [Dyspnea on exertion] : not dyspnea during exertion [Chest Pain] : no chest pain [Lower Ext Edema] : no extremity edema [Cough] : no cough [Wheezing] : no wheezing [Abdominal Pain] : no abdominal pain [Nausea] : no nausea [Urinary Frequency] : no change in urinary frequency [Hematuria] : no hematuria [Joint Pain] : joint pain [Muscle Cramps] : no muscle cramps [Skin: A Rash] : no rash: [Skin Lesions] : no skin lesions [Dizziness] : no dizziness [Tremor] : no tremor was seen [Tingling (Paresthesia)] : no tingling [Confusion] : no confusion was observed [Memory Lapses Or Loss] : no memory lapses or loss [Excessive Thirst] : no polydipsia [Easy Bleeding] : no tendency for easy bleeding [Easy Bruising] : no tendency for easy bruising

## 2021-04-17 NOTE — PHYSICAL EXAM
[Well Groomed] : well groomed [General Appearance - In No Acute Distress] : no acute distress [Normal Conjunctiva] : the conjunctiva exhibited no abnormalities [Normal Oral Mucosa] : normal oral mucosa [No Oral Pallor] : no oral pallor [Normal Jugular Venous V Waves Present] : normal jugular venous V waves present [Heart Rate And Rhythm] : heart rate and rhythm were normal [Heart Sounds] : normal S1 and S2 [Arterial Pulses Normal] : the arterial pulses were normal [Edema] : no peripheral edema present [FreeTextEntry1] : 3/6 ASMITA PLASENCIA [Respiration, Rhythm And Depth] : normal respiratory rhythm and effort [Auscultation Breath Sounds / Voice Sounds] : lungs were clear to auscultation bilaterally [Bowel Sounds] : normal bowel sounds [Abdomen Soft] : soft [Abdomen Tenderness] : non-tender [Abnormal Walk] : normal gait [Nail Clubbing] : no clubbing of the fingernails [Petechial Hemorrhages (___cm)] : no petechial hemorrhages [Skin Turgor] : normal skin turgor [] : no rash [Oriented To Time, Place, And Person] : oriented to person, place, and time [Impaired Insight] : insight and judgment were intact [Memory Remote] : remote memory was not impaired

## 2021-04-21 ENCOUNTER — APPOINTMENT (OUTPATIENT)
Dept: DERMATOLOGY | Facility: CLINIC | Age: 71
End: 2021-04-21
Payer: MEDICARE

## 2021-04-21 PROCEDURE — 99213 OFFICE O/P EST LOW 20 MIN: CPT

## 2021-04-22 RX ORDER — BUSPIRONE HYDROCHLORIDE 5 MG/1
5 TABLET ORAL
Refills: 0 | Status: DISCONTINUED | COMMUNITY
Start: 2021-04-15 | End: 2021-04-22

## 2021-04-25 LAB
CHOLEST SERPL-MCNC: 200 MG/DL
HDLC SERPL-MCNC: 86 MG/DL
LDLC SERPL CALC-MCNC: 100 MG/DL
NONHDLC SERPL-MCNC: 115 MG/DL
TRIGL SERPL-MCNC: 72 MG/DL

## 2021-04-26 ENCOUNTER — RX CHANGE (OUTPATIENT)
Age: 71
End: 2021-04-26

## 2021-05-07 ENCOUNTER — APPOINTMENT (OUTPATIENT)
Dept: CARDIOLOGY | Facility: CLINIC | Age: 71
End: 2021-05-07
Payer: MEDICARE

## 2021-05-07 PROCEDURE — 93306 TTE W/DOPPLER COMPLETE: CPT

## 2021-05-07 PROCEDURE — 93880 EXTRACRANIAL BILAT STUDY: CPT

## 2021-05-11 ENCOUNTER — APPOINTMENT (OUTPATIENT)
Dept: INTERNAL MEDICINE | Facility: CLINIC | Age: 71
End: 2021-05-11
Payer: MEDICARE

## 2021-05-11 VITALS
DIASTOLIC BLOOD PRESSURE: 80 MMHG | TEMPERATURE: 97 F | OXYGEN SATURATION: 94 % | WEIGHT: 162 LBS | RESPIRATION RATE: 14 BRPM | HEART RATE: 71 BPM | BODY MASS INDEX: 23.24 KG/M2 | SYSTOLIC BLOOD PRESSURE: 120 MMHG

## 2021-05-11 PROCEDURE — 36415 COLL VENOUS BLD VENIPUNCTURE: CPT

## 2021-05-11 PROCEDURE — 99214 OFFICE O/P EST MOD 30 MIN: CPT | Mod: 25

## 2021-05-11 NOTE — ASSESSMENT
[FreeTextEntry1] : Venipuncture done in our office, Labs sent out/ further recommendations will be made based on lab results. Patient advised to continue present medications with diet/exercise and specialist followup. RTO in sept for CP\par \par discussed shingrix vaccine, +got covid vaccine\par colonoscopy was 8/16 and was normal-followup in 5years-Dr. Stevens\par specialists include\par 1. cardiology prn-Dr. Ruano\par 2. urologyQyear-Dr. Goldberg\par 3.dermatology q. year-Dr. Agustin\par 4.ophthalmology prn-Dr. Mcclelland\par 5.Neuro-Dr. iMna\par 6.Neuro-psych-Dr. Galaviz\par 7.Ortho-Dr. Tang\par 24-hour urine done 9/2019=monitor serum creatinine\par Hep C screening in past was negative\par Cath 7/2018\par MRI brain 7/2019\par \par \par \par \par

## 2021-05-11 NOTE — HISTORY OF PRESENT ILLNESS
[FreeTextEntry1] : Patient presents for followup on hypertension/hyperlipidemia/med check. Patient is currently fasting/no new issues.Patient is currently on quinapril for hypertension and on zetia for hyperlipidemia.\par -feels well\par -+got covid vaccines\par

## 2021-05-12 LAB
ALBUMIN SERPL ELPH-MCNC: 4.3 G/DL
ALP BLD-CCNC: 64 U/L
ALT SERPL-CCNC: 24 U/L
ANION GAP SERPL CALC-SCNC: 11 MMOL/L
AST SERPL-CCNC: 24 U/L
BASOPHILS # BLD AUTO: 0.05 K/UL
BASOPHILS NFR BLD AUTO: 0.9 %
BILIRUB SERPL-MCNC: 0.9 MG/DL
BUN SERPL-MCNC: 18 MG/DL
CALCIUM SERPL-MCNC: 9.5 MG/DL
CHLORIDE SERPL-SCNC: 104 MMOL/L
CHOLEST SERPL-MCNC: 213 MG/DL
CO2 SERPL-SCNC: 26 MMOL/L
CREAT SERPL-MCNC: 1.29 MG/DL
EOSINOPHIL # BLD AUTO: 0.08 K/UL
EOSINOPHIL NFR BLD AUTO: 1.5 %
GLUCOSE SERPL-MCNC: 93 MG/DL
HCT VFR BLD CALC: 45.4 %
HDLC SERPL-MCNC: 91 MG/DL
HGB BLD-MCNC: 15 G/DL
IMM GRANULOCYTES NFR BLD AUTO: 0.2 %
LDLC SERPL CALC-MCNC: 111 MG/DL
LYMPHOCYTES # BLD AUTO: 1.23 K/UL
LYMPHOCYTES NFR BLD AUTO: 22.6 %
MAN DIFF?: NORMAL
MCHC RBC-ENTMCNC: 32.3 PG
MCHC RBC-ENTMCNC: 33 GM/DL
MCV RBC AUTO: 97.6 FL
MONOCYTES # BLD AUTO: 0.54 K/UL
MONOCYTES NFR BLD AUTO: 9.9 %
NEUTROPHILS # BLD AUTO: 3.53 K/UL
NEUTROPHILS NFR BLD AUTO: 64.9 %
NONHDLC SERPL-MCNC: 122 MG/DL
PLATELET # BLD AUTO: 312 K/UL
POTASSIUM SERPL-SCNC: 4.2 MMOL/L
PROT SERPL-MCNC: 6.7 G/DL
RBC # BLD: 4.65 M/UL
RBC # FLD: 12.9 %
SODIUM SERPL-SCNC: 141 MMOL/L
TRIGL SERPL-MCNC: 54 MG/DL
WBC # FLD AUTO: 5.44 K/UL

## 2021-05-13 ENCOUNTER — NON-APPOINTMENT (OUTPATIENT)
Age: 71
End: 2021-05-13

## 2021-06-01 ENCOUNTER — RX CHANGE (OUTPATIENT)
Age: 71
End: 2021-06-01

## 2021-06-07 ENCOUNTER — APPOINTMENT (OUTPATIENT)
Dept: ORTHOPEDIC SURGERY | Facility: CLINIC | Age: 71
End: 2021-06-07
Payer: MEDICARE

## 2021-06-07 VITALS
BODY MASS INDEX: 23.19 KG/M2 | HEART RATE: 65 BPM | HEIGHT: 70 IN | DIASTOLIC BLOOD PRESSURE: 68 MMHG | WEIGHT: 162 LBS | SYSTOLIC BLOOD PRESSURE: 126 MMHG

## 2021-06-07 DIAGNOSIS — M24.811 OTHER SPECIFIC JOINT DERANGEMENTS OF RIGHT SHOULDER, NOT ELSEWHERE CLASSIFIED: ICD-10-CM

## 2021-06-07 PROCEDURE — 99214 OFFICE O/P EST MOD 30 MIN: CPT | Mod: 25

## 2021-06-07 PROCEDURE — 20610 DRAIN/INJ JOINT/BURSA W/O US: CPT | Mod: RT

## 2021-06-07 NOTE — PHYSICAL EXAM
[de-identified] : Physical Exam:\par General: Well appearing, no acute distress, A&O\par Neurologic: A&Ox3, No focal deficits\par Head: NCAT without abrasions, lacerations, or ecchymosis to head, face, or scalp\par Eyes: No scleral icterus, no gross abnormalities\par Respiratory: Equal chest wall expansion bilaterally, no accessory muscle use\par Skin: no erythema\par Psychiatric: Normal mood and affect\par \par Right shoulder: Forward flexion 150 degrees with hitching, external rotation 35 degrees w/ resistance, internal rotation to L5.  Abduction 140 degrees.  He does complain of some mild pain with terminal flexion and abduction.  Subjective weakness with isometric abduction forward flexion and external rotation against the wall.  Negative painful arc.  Negative drop arm side.  Negative Jennifer empty can test.  Negative Hornblower sign.  No external rotation lag noted.\par \par Left shoulder:Forward flexion 170 degrees, external rotation 45 degrees, internal rotation to L1.  Abduction 150 degrees.  Negative painful arc.  Negative drop arm side.  Negative Jennifer empty can test.  Negative Hornblower sign.  No external rotation lag noted.

## 2021-06-07 NOTE — PROCEDURE
[de-identified] : Injection: Right shoulder (Subacromial). \par Indication: shoulder stiffness \par \par A discussion was had with the patient regarding this procedure and all questions were answered. All risks, benefits and alternatives were discussed. These included but were not limited to bleeding, infection, and allergic reaction. Alcohol was used to clean the skin, and betadine was used to sterilize and prep the area in the posterior aspect of the right shoulder. Ethyl chloride spray was then used as a topical anesthetic. A 22-gauge needle was used to inject 3cc 1% xylocaine, 2cc 0.25% bupivacaine and 1cc of 40mg/mL triamcinolone acetonide into the right subacromial space. A sterile bandage was then applied. The patient tolerated the procedure well and there were no complications.

## 2021-06-07 NOTE — ADDENDUM
[FreeTextEntry1] : Documented by Jay Jay Chacon acting as a scribe for Dr. Tang on 06/07/2021. \par \par All medical record entries made by the Scribe were at my, Dr. Tang's, direction and\par personally dictated by me on 06/07/2021. I have reviewed the chart and agree that the record\par accurately reflects my personal performance of the history, physical exam, procedure and imaging.

## 2021-06-07 NOTE — HISTORY OF PRESENT ILLNESS
[Worsening] : worsening [2] : an average pain level of 2/10 [de-identified] : DARIUS is a 70 year male who presents today for follow up for 18 months status post right shoulder arthroscopy with massive rotator cuff repair.  Overall he is feeling very well.  He endorses mild pain in his shoulder.  He has almost complete range of motion of his right shoulder and only complains of pain with terminal forward flexion and abduction.  Is he does have subjective weakness although he feels his strength is improving daily.  He states symptoms at night, but denies inability to sleep.  He reports pain is around trapezius muscle, and around biceps that radiates upward into neck.

## 2021-06-07 NOTE — REVIEW OF SYSTEMS
[Joint Stiffness] : joint stiffness [Negative] : Heme/Lymph [Joint Pain] : joint pain [FreeTextEntry9] : right shoulder

## 2021-06-07 NOTE — DISCUSSION/SUMMARY
[de-identified] : DARIUS is a 70 year male who presents today for follow up for 18 months status post right shoulder arthroscopy with massive rotator cuff repair.  Overall he is feeling very well.  He endorses mild pain in his shoulder.  He has almost complete range of motion of his right shoulder and only complains of pain with terminal forward flexion and abduction.  Is he does have subjective weakness although he feels his strength is improving daily.  He states symptoms at night, but denies inability to sleep.  He reports pain is around trapezius muscle, and around biceps that radiates upward into neck.  \par \par We had a thorough discussion regarding the nature of his pain, the pathophysiology, as well as all treatment options. Pt due to his shoulder pain and stiffness 18 months post op, elected for a corticosteroid injection at today's visit and tolerated the procedure well. He should take it easy for the next 2-3 days while icing the joint. Conservative measures of treatment include rest until asymptomatic, activity avoidance, NSAID's PRN, application to ice to the area 2-3x daily for 20 minutes, with gradual return to activities. Patient will follow up in 6-8 wks for repeat clinical assessment or on prn basis. All questions were answered and the patient verbalized understanding. The patient is in agreement with this treatment plan.  \par \par

## 2021-07-02 ENCOUNTER — APPOINTMENT (OUTPATIENT)
Dept: NEUROLOGY | Facility: CLINIC | Age: 71
End: 2021-07-02
Payer: MEDICARE

## 2021-07-02 VITALS
WEIGHT: 160 LBS | SYSTOLIC BLOOD PRESSURE: 118 MMHG | HEIGHT: 70 IN | BODY MASS INDEX: 22.9 KG/M2 | HEART RATE: 62 BPM | DIASTOLIC BLOOD PRESSURE: 61 MMHG

## 2021-07-02 DIAGNOSIS — R48.2 APRAXIA: ICD-10-CM

## 2021-07-02 PROCEDURE — 99215 OFFICE O/P EST HI 40 MIN: CPT | Mod: 25

## 2021-07-02 PROCEDURE — 96116 NUBHVL XM PHYS/QHP 1ST HR: CPT | Mod: 59

## 2021-07-02 RX ORDER — ASPIRIN 81 MG
81 TABLET, DELAYED RELEASE (ENTERIC COATED) ORAL
Refills: 0 | Status: ACTIVE | COMMUNITY

## 2021-07-07 LAB
TSH SERPL-ACNC: 1.55 UIU/ML
VIT B12 SERPL-MCNC: 611 PG/ML

## 2021-07-12 ENCOUNTER — RX RENEWAL (OUTPATIENT)
Age: 71
End: 2021-07-12

## 2021-07-12 LAB — VIT B1 SERPL-MCNC: 163.9 NMOL/L

## 2021-07-19 ENCOUNTER — APPOINTMENT (OUTPATIENT)
Dept: NUCLEAR MEDICINE | Facility: IMAGING CENTER | Age: 71
End: 2021-07-19
Payer: MEDICARE

## 2021-07-19 ENCOUNTER — OUTPATIENT (OUTPATIENT)
Dept: OUTPATIENT SERVICES | Facility: HOSPITAL | Age: 71
LOS: 1 days | End: 2021-07-19
Payer: MEDICARE

## 2021-07-19 ENCOUNTER — NON-APPOINTMENT (OUTPATIENT)
Age: 71
End: 2021-07-19

## 2021-07-19 ENCOUNTER — TRANSCRIPTION ENCOUNTER (OUTPATIENT)
Age: 71
End: 2021-07-19

## 2021-07-19 DIAGNOSIS — F03.90 UNSPECIFIED DEMENTIA WITHOUT BEHAVIORAL DISTURBANCE: ICD-10-CM

## 2021-07-19 PROCEDURE — 78608 BRAIN IMAGING (PET): CPT

## 2021-07-19 PROCEDURE — 78608 BRAIN IMAGING (PET): CPT | Mod: 26,MH

## 2021-07-19 PROCEDURE — A9552: CPT

## 2021-07-20 ENCOUNTER — TRANSCRIPTION ENCOUNTER (OUTPATIENT)
Age: 71
End: 2021-07-20

## 2021-07-21 ENCOUNTER — NON-APPOINTMENT (OUTPATIENT)
Age: 71
End: 2021-07-21

## 2021-09-21 ENCOUNTER — APPOINTMENT (OUTPATIENT)
Dept: INTERNAL MEDICINE | Facility: CLINIC | Age: 71
End: 2021-09-21
Payer: MEDICARE

## 2021-09-21 VITALS
RESPIRATION RATE: 12 BRPM | SYSTOLIC BLOOD PRESSURE: 115 MMHG | HEART RATE: 72 BPM | TEMPERATURE: 97.2 F | HEIGHT: 70 IN | DIASTOLIC BLOOD PRESSURE: 70 MMHG | WEIGHT: 156 LBS | BODY MASS INDEX: 22.33 KG/M2

## 2021-09-21 DIAGNOSIS — Z13.31 ENCOUNTER FOR SCREENING FOR DEPRESSION: ICD-10-CM

## 2021-09-21 DIAGNOSIS — R79.89 OTHER SPECIFIED ABNORMAL FINDINGS OF BLOOD CHEMISTRY: ICD-10-CM

## 2021-09-21 PROCEDURE — G0444 DEPRESSION SCREEN ANNUAL: CPT | Mod: 59

## 2021-09-21 PROCEDURE — 90662 IIV NO PRSV INCREASED AG IM: CPT

## 2021-09-21 PROCEDURE — 36415 COLL VENOUS BLD VENIPUNCTURE: CPT

## 2021-09-21 PROCEDURE — G0439: CPT

## 2021-09-21 PROCEDURE — G0008: CPT

## 2021-09-21 RX ORDER — AZELASTINE HYDROCHLORIDE 137 UG/1
0.1 SPRAY, METERED NASAL
Qty: 30 | Refills: 0 | Status: DISCONTINUED | COMMUNITY
Start: 2021-07-20

## 2021-09-21 RX ORDER — BENZONATATE 100 MG/1
100 CAPSULE ORAL
Qty: 15 | Refills: 0 | Status: DISCONTINUED | COMMUNITY
Start: 2021-07-20

## 2021-09-21 RX ORDER — ATORVASTATIN CALCIUM 10 MG/1
10 TABLET, FILM COATED ORAL
Refills: 0 | Status: DISCONTINUED | COMMUNITY
End: 2021-09-21

## 2021-09-21 NOTE — ASSESSMENT
[FreeTextEntry1] : 70-year-old male currently medically stable with controlled hypertension on present medications without any indication of active medical issues.\par \par Patient with progressive memory issues with neurological evaluation diagnosed with Alzheimer's disease.\par Continue present treatment with followup with neurology.\par \par The patient is status post substernal chest pain July 2018 with cardiac catheterization improving he has no CAD\par Echocardiogram April 2021 essentially normal.\par carotid duplex with plaque without stenosis..\par \par Patient is to continue present medications and supplements along with his good diet and exercise.\par \par chronic anxiety benefitted by BuSpar BuSpar \par \par Colonoscopy August 2016 with followup in 5 years. Referral given\par Urology followup recommended\par \par Pneumococcal vaccine received\par High-dose influenza vaccine given left deltoid\par Recieved the COVID vaccine\par Venipuncture done today in the office\par Followup in 4 moths

## 2021-09-21 NOTE — PHYSICAL EXAM
[General Appearance - Alert] : alert [General Appearance - In No Acute Distress] : in no acute distress [Sclera] : the sclera and conjunctiva were normal [PERRL With Normal Accommodation] : pupils were equal in size, round, and reactive to light [Extraocular Movements] : extraocular movements were intact [Outer Ear] : the ears and nose were normal in appearance [Oropharynx] : the oropharynx was normal [Neck Appearance] : the appearance of the neck was normal [Neck Cervical Mass (___cm)] : no neck mass was observed [Jugular Venous Distention Increased] : there was no jugular-venous distention [Thyroid Diffuse Enlargement] : the thyroid was not enlarged [Thyroid Nodule] : there were no palpable thyroid nodules [Auscultation Breath Sounds / Voice Sounds] : lungs were clear to auscultation bilaterally [Heart Rate And Rhythm] : heart rate was normal and rhythm regular [Heart Sounds] : normal S1 and S2 [Heart Sounds Gallop] : no gallops [Murmurs] : no murmurs [Heart Sounds Pericardial Friction Rub] : no pericardial rub [Arterial Pulses Carotid] : carotid pulses were normal with no bruits [Abdominal Aorta] : the abdominal aorta was normal [Arterial Pulses Femoral] : femoral pulses were normal without bruits [Full Pulse] : the pedal pulses are present [Edema] : there was no peripheral edema [Veins - Varicosity Changes] : there were no varicosital changes [Bowel Sounds] : normal bowel sounds [Abdomen Soft] : soft [Abdomen Tenderness] : non-tender [Abdomen Mass (___ Cm)] : no abdominal mass palpated [Patient Refused] : rectal exam was refused by the patient [Cervical Lymph Nodes Enlarged Posterior Bilaterally] : posterior cervical [Cervical Lymph Nodes Enlarged Anterior Bilaterally] : anterior cervical [Supraclavicular Lymph Nodes Enlarged Bilaterally] : supraclavicular [Axillary Lymph Nodes Enlarged Bilaterally] : axillary [Femoral Lymph Nodes Enlarged Bilaterally] : femoral [Inguinal Lymph Nodes Enlarged Bilaterally] : inguinal [No Spinal Tenderness] : no spinal tenderness [Abnormal Walk] : normal gait [Nail Clubbing] : no clubbing  or cyanosis of the fingernails [Musculoskeletal - Swelling] : no joint swelling seen [Motor Tone] : muscle strength and tone were normal [Skin Color & Pigmentation] : normal skin color and pigmentation [Skin Turgor] : normal skin turgor [] : no rash [Cranial Nerves] : cranial nerves 2-12 were intact [No Focal Deficits] : no focal deficits [Oriented To Time, Place, And Person] : oriented to person, place, and time [Impaired Insight] : insight and judgment were intact [Affect] : the affect was normal [FreeTextEntry1] : By urology [Over the Past 2 Weeks, Have You Felt Down, Depressed, or Hopeless?] : 1.) Over the past 2 weeks, have you felt down, depressed, or hopeless? No [Over the Past 2 Weeks, Have You Felt Little Interest or Pleasure Doing Things?] : 2.) Over the past 2 weeks, have you felt little interest or pleasure doing things? No

## 2021-09-21 NOTE — REVIEW OF SYSTEMS
[Anxiety] : anxiety [Negative] : Heme/Lymph [see HPI] : see HPI [Suicidal] : not suicidal [Sleep Disturbances] : no sleep disturbances [Depression] : no depression [Change In Personality] : no personality change [Emotional Problems] : no emotional problems

## 2021-09-21 NOTE — DATA REVIEWED
[FreeTextEntry1] : Echocardiogram April 2021 with normal LVEF with aortic sclerosis without stenosis.\par Carotid duplex with plaque without stenosis.\par

## 2021-09-21 NOTE — COUNSELING
[None] : None [Good understanding] : Patient has a good understanding of lifestyle changes and the steps needed to achieve self management goals [de-identified] : continue diet and exercise

## 2021-09-21 NOTE — HEALTH RISK ASSESSMENT
[Good] : ~his/her~ current health as good [Very Good] : ~his/her~  mood as very good [Yes] : Yes [2 - 3 times a week (3 pts)] : 2 - 3  times a week (3 points) [1 or 2 (0 pts)] : 1 or 2 (0 points) [Never (0 pts)] : Never (0 points) [No] : In the past 12 months have you used drugs other than those required for medical reasons? No [No falls in past year] : Patient reported no falls in the past year [0] : 2) Feeling down, depressed, or hopeless: Not at all (0) [None] : None [With Significant Other] : lives with significant other [# of Members in Household ___] :  household currently consist of [unfilled] member(s) [Retired] : retired [College] : College [] :  [# Of Children ___] : has [unfilled] children [Sexually Active] : sexually active [Feels Safe at Home] : Feels safe at home [Fully functional (using the telephone, shopping, preparing meals, housekeeping, doing laundry, using] : Fully functional and needs no help or supervision to perform IADLs (using the telephone, shopping, preparing meals, housekeeping, doing laundry, using transportation, managing medications and managing finances) [Smoke Detector] : smoke detector [Carbon Monoxide Detector] : carbon monoxide detector [Seat Belt] :  uses seat belt [Discussed at today's visit] : Advance Directives Discussed at today's visit [Designated Healthcare Proxy] : Designated healthcare proxy [Name: ___] : Health Care Proxy's Name: [unfilled]  [Fully functional (bathing, dressing, toileting, transferring, walking, feeding)] : Fully functional (bathing, dressing, toileting, transferring, walking, feeding) [Independent] : doing laundry [Some assistance needed] : preparing meals [Full assistance needed] : managing finances [] : No [Audit-CScore] : 3 [de-identified] : exercises [de-identified] : good [[id number]] : 0 [Change in mental status noted] : No change in mental status noted [Reports changes in hearing] : Reports no changes in hearing [Reports changes in vision] : Reports no changes in vision [Reports changes in dental health] : Reports no changes in dental health [Guns at Home] : no guns at home [Sunscreen] : does not use sunscreen [FreeTextEntry2] :  [de-identified] : recommended sunscreen

## 2021-09-21 NOTE — HISTORY OF PRESENT ILLNESS
[FreeTextEntry1] : 70-year-old male whose only chronic medical issues including hypertension for which he takes Quinipril and BPH with erectile dysfunction for which he follows with urologist Dr. Santiago presents for his yearly physical.\par \par The patient does run a mildly elevated creatinine with 24-hour urine creatinine clearance September 2019 = 81 mL/minute.\par \par July 2018 the patient had chest pain while at the gym therefore went to the ER with evaluation showing negative cardiac enzymes.\par Cardiac catheterization was normal with no CAD.\par No issues since.\par \par Patient with BPH and elevated PSA following with urology last seen March 2019. He states he may or may not followup with him \par \par He follows a good diet and exercises regularly and his weight is stable.\par \par the patient has been having memory difficulties starting about 2017 and saw neurology 2019 with diagnosis of memory impairment which was mild.\par Workup included blood work which was normal an MRI showing microvascular changes.\par the patient started on Memantine which he has been on since 2019 and feels his memory is about the same without any improvement or worsening.\par He has since followed up with neurology with testing positive for Alzheimer's disease with added Aricept.\par The patient's wife feels his memory has actually gotten worse \par Also on 81 mg aspirin daily and atorvastatin.\par \par patient with some reactive anxiety for which he is on BuSpar with benefit.\par \par  the patient had right shoulder rotator cuff tear and arthroscopic surgery January 2020 with good results.

## 2021-09-22 ENCOUNTER — NON-APPOINTMENT (OUTPATIENT)
Age: 71
End: 2021-09-22

## 2021-09-22 LAB
ALBUMIN SERPL ELPH-MCNC: 4.3 G/DL
ALP BLD-CCNC: 72 U/L
ALT SERPL-CCNC: 17 U/L
ANION GAP SERPL CALC-SCNC: 13 MMOL/L
APPEARANCE: CLEAR
AST SERPL-CCNC: 16 U/L
BACTERIA: NEGATIVE
BASOPHILS # BLD AUTO: 0.04 K/UL
BASOPHILS NFR BLD AUTO: 0.6 %
BILIRUB SERPL-MCNC: 0.5 MG/DL
BILIRUBIN URINE: NEGATIVE
BLOOD URINE: NEGATIVE
BUN SERPL-MCNC: 21 MG/DL
CALCIUM SERPL-MCNC: 9.4 MG/DL
CHLORIDE SERPL-SCNC: 103 MMOL/L
CHOLEST SERPL-MCNC: 182 MG/DL
CO2 SERPL-SCNC: 24 MMOL/L
COLOR: NORMAL
CREAT SERPL-MCNC: 1.27 MG/DL
EOSINOPHIL # BLD AUTO: 0.1 K/UL
EOSINOPHIL NFR BLD AUTO: 1.4 %
GLUCOSE QUALITATIVE U: NEGATIVE
GLUCOSE SERPL-MCNC: 95 MG/DL
HCT VFR BLD CALC: 43.3 %
HDLC SERPL-MCNC: 70 MG/DL
HGB BLD-MCNC: 14.3 G/DL
HYALINE CASTS: 0 /LPF
IMM GRANULOCYTES NFR BLD AUTO: 0.3 %
KETONES URINE: NEGATIVE
LDLC SERPL CALC-MCNC: 100 MG/DL
LEUKOCYTE ESTERASE URINE: NEGATIVE
LYMPHOCYTES # BLD AUTO: 1.23 K/UL
LYMPHOCYTES NFR BLD AUTO: 17.8 %
MAN DIFF?: NORMAL
MCHC RBC-ENTMCNC: 32.5 PG
MCHC RBC-ENTMCNC: 33 GM/DL
MCV RBC AUTO: 98.4 FL
MICROSCOPIC-UA: NORMAL
MONOCYTES # BLD AUTO: 0.64 K/UL
MONOCYTES NFR BLD AUTO: 9.3 %
NEUTROPHILS # BLD AUTO: 4.88 K/UL
NEUTROPHILS NFR BLD AUTO: 70.6 %
NITRITE URINE: NEGATIVE
NONHDLC SERPL-MCNC: 113 MG/DL
PH URINE: 5
PLATELET # BLD AUTO: 341 K/UL
POTASSIUM SERPL-SCNC: 4.6 MMOL/L
PROT SERPL-MCNC: 6.5 G/DL
PROTEIN URINE: NEGATIVE
PSA SERPL-MCNC: 3.08 NG/ML
RBC # BLD: 4.4 M/UL
RBC # FLD: 12.5 %
RED BLOOD CELLS URINE: 1 /HPF
SODIUM SERPL-SCNC: 141 MMOL/L
SPECIFIC GRAVITY URINE: 1.02
SQUAMOUS EPITHELIAL CELLS: 0 /HPF
TRIGL SERPL-MCNC: 64 MG/DL
UROBILINOGEN URINE: NORMAL
WBC # FLD AUTO: 6.91 K/UL
WHITE BLOOD CELLS URINE: 1 /HPF

## 2021-09-30 ENCOUNTER — NON-APPOINTMENT (OUTPATIENT)
Age: 71
End: 2021-09-30

## 2021-10-14 ENCOUNTER — RX RENEWAL (OUTPATIENT)
Age: 71
End: 2021-10-14

## 2021-11-03 ENCOUNTER — TRANSCRIPTION ENCOUNTER (OUTPATIENT)
Age: 71
End: 2021-11-03

## 2021-11-05 ENCOUNTER — NON-APPOINTMENT (OUTPATIENT)
Age: 71
End: 2021-11-05

## 2021-12-10 ENCOUNTER — NON-APPOINTMENT (OUTPATIENT)
Age: 71
End: 2021-12-10

## 2021-12-10 ENCOUNTER — APPOINTMENT (OUTPATIENT)
Dept: GERIATRICS | Facility: CLINIC | Age: 71
End: 2021-12-10
Payer: MEDICARE

## 2021-12-10 VITALS
SYSTOLIC BLOOD PRESSURE: 142 MMHG | HEIGHT: 70 IN | OXYGEN SATURATION: 98 % | TEMPERATURE: 97.7 F | BODY MASS INDEX: 27.11 KG/M2 | DIASTOLIC BLOOD PRESSURE: 90 MMHG | RESPIRATION RATE: 16 BRPM | HEART RATE: 75 BPM | WEIGHT: 189.38 LBS

## 2021-12-10 DIAGNOSIS — Z87.828 PERSONAL HISTORY OF OTHER (HEALED) PHYSICAL INJURY AND TRAUMA: ICD-10-CM

## 2021-12-10 PROCEDURE — G0444 DEPRESSION SCREEN ANNUAL: CPT | Mod: 59

## 2021-12-10 PROCEDURE — 99205 OFFICE O/P NEW HI 60 MIN: CPT | Mod: 25

## 2021-12-10 RX ORDER — MEMANTINE HYDROCHLORIDE 10 MG/1
10 TABLET, FILM COATED ORAL
Qty: 180 | Refills: 3 | Status: DISCONTINUED | COMMUNITY
Start: 2019-11-04 | End: 2021-12-10

## 2021-12-13 ENCOUNTER — APPOINTMENT (OUTPATIENT)
Dept: ORTHOPEDIC SURGERY | Facility: CLINIC | Age: 71
End: 2021-12-13
Payer: MEDICARE

## 2021-12-13 VITALS
SYSTOLIC BLOOD PRESSURE: 129 MMHG | WEIGHT: 158 LBS | HEIGHT: 70 IN | HEART RATE: 70 BPM | DIASTOLIC BLOOD PRESSURE: 70 MMHG | BODY MASS INDEX: 22.62 KG/M2

## 2021-12-13 PROCEDURE — 99214 OFFICE O/P EST MOD 30 MIN: CPT | Mod: 25

## 2021-12-13 PROCEDURE — 20610 DRAIN/INJ JOINT/BURSA W/O US: CPT | Mod: RT

## 2021-12-14 NOTE — DISCUSSION/SUMMARY
[de-identified] : DARIUS is a 70 year male who presents today for follow up for 2 year status post right shoulder arthroscopy with massive rotator cuff repair.  At last appt on 06/07/2021 patient received R shoulder subacromial cortisone injection. He presents today requesting R shoulder cortisone injection. \par \par We had a thorough discussion regarding the nature of his pain, the pathophysiology, as well as all treatment options. I discussed operative and non-operative treatment modalities. Pt due to his acute pain elected for a corticosteroid injection at today's visit and tolerated the procedure well. He should take it easy for the next 2-3 days while icing the joint. Conservative measures of treatment include rest until asymptomatic, activity avoidance, NSAID's PRN, application to ice to the area 2-3x daily for 20 minutes, with gradual return to activities. Patient will follow up on prn basis for repeat clinical assessment. All questions were answered and the patient verbalized understanding. The patient is in agreement with this treatment plan.

## 2021-12-14 NOTE — ADDENDUM
[FreeTextEntry1] : Documented by Jay Jay Chacon acting as a scribe for Dr. Tang on 12/13/2021. \par \par All medical record entries made by the Scribe were at my, Dr. Tang's, direction and\par personally dictated by me on 12/13/2021. I have reviewed the chart and agree that the record\par accurately reflects my personal performance of the history, physical exam, procedure and imaging.

## 2021-12-14 NOTE — PHYSICAL EXAM
[de-identified] : Physical Exam:\par General: Well appearing, no acute distress, A&O\par Neurologic: A&Ox3, No focal deficits\par Head: NCAT without abrasions, lacerations, or ecchymosis to head, face, or scalp\par Eyes: No scleral icterus, no gross abnormalities\par Respiratory: Equal chest wall expansion bilaterally, no accessory muscle use\par Skin: no erythema\par Psychiatric: Normal mood and affect\par \par Right shoulder: Forward flexion 150 degrees with hitching, external rotation 35 degrees w/ resistance, internal rotation to L5.  Abduction 140 degrees.  He does complain of some mild pain with terminal flexion and abduction.  Subjective weakness with isometric abduction forward flexion and external rotation against the wall.  Negative painful arc.  Negative drop arm side.  Negative Jennifer empty can test.  Negative Hornblower sign.  No external rotation lag noted.\par \par Left shoulder:Forward flexion 170 degrees, external rotation 45 degrees, internal rotation to L1.  Abduction 150 degrees.  Negative painful arc.  Negative drop arm side.  Negative Jennifer empty can test.  Negative Hornblower sign.  No external rotation lag noted.

## 2021-12-14 NOTE — HISTORY OF PRESENT ILLNESS
[Worsening] : worsening [2] : an average pain level of 2/10 [de-identified] : DARIUS is a 70 year male who presents today for follow up for 2 year status post right shoulder arthroscopy with massive rotator cuff repair.  At last appt on 06/07/2021 patient received R shoulder subacromial cortisone injection. He presents today requesting R shoulder cortisone injection.

## 2021-12-14 NOTE — PROCEDURE
[de-identified] : Injection: Right shoulder (Subacromial). \par Indication: shoulder stiffness \par \par A discussion was had with the patient regarding this procedure and all questions were answered. All risks, benefits and alternatives were discussed. These included but were not limited to bleeding, infection, and allergic reaction. Alcohol was used to clean the skin, and betadine was used to sterilize and prep the area in the posterior aspect of the right shoulder. Ethyl chloride spray was then used as a topical anesthetic. A 22-gauge needle was used to inject 3cc 1% xylocaine, 2cc 0.25% bupivacaine and 1cc of 40mg/mL triamcinolone acetonide into the right subacromial space. A sterile bandage was then applied. The patient tolerated the procedure well and there were no complications.

## 2021-12-17 ENCOUNTER — APPOINTMENT (OUTPATIENT)
Dept: INTERNAL MEDICINE | Facility: CLINIC | Age: 71
End: 2021-12-17
Payer: MEDICARE

## 2021-12-17 VITALS
HEIGHT: 70 IN | TEMPERATURE: 97.8 F | SYSTOLIC BLOOD PRESSURE: 130 MMHG | HEART RATE: 77 BPM | BODY MASS INDEX: 21.9 KG/M2 | RESPIRATION RATE: 12 BRPM | WEIGHT: 153 LBS | DIASTOLIC BLOOD PRESSURE: 80 MMHG

## 2021-12-17 PROCEDURE — 99213 OFFICE O/P EST LOW 20 MIN: CPT

## 2021-12-19 PROBLEM — Z87.828 HISTORY OF HEAD INJURY: Status: ACTIVE | Noted: 2021-07-02

## 2021-12-19 LAB
APPEARANCE: CLEAR
BACTERIA: NEGATIVE
BILIRUBIN URINE: NEGATIVE
BLOOD URINE: NEGATIVE
COLOR: NORMAL
GLUCOSE QUALITATIVE U: NEGATIVE
HYALINE CASTS: 0 /LPF
KETONES URINE: NEGATIVE
LEUKOCYTE ESTERASE URINE: NEGATIVE
MICROSCOPIC-UA: NORMAL
NITRITE URINE: NEGATIVE
PH URINE: 5
PROTEIN URINE: NEGATIVE
RED BLOOD CELLS URINE: 0 /HPF
SPECIFIC GRAVITY URINE: 1.01
SQUAMOUS EPITHELIAL CELLS: 0 /HPF
UROBILINOGEN URINE: NORMAL
WHITE BLOOD CELLS URINE: 0 /HPF

## 2021-12-19 NOTE — REASON FOR VISIT
[Initial Evaluation] : an initial evaluation [Spouse] : spouse [FreeTextEntry1] : memory loss [FreeTextEntry3] :  keely Merrill [phone] [FreeTextEntry2] : who assist with history d/t pt with baseline memory loss

## 2021-12-19 NOTE — PHYSICAL EXAM
[Normal] : normal skin color and pigmentation [Normal Insight/Judgment] : insight and judgment were not intact [de-identified] : alert,  [de-identified] : withdrawn

## 2021-12-19 NOTE — HISTORY OF PRESENT ILLNESS
[Patient declined hearing screen] : Patient declined hearing screen [Patient declined vision screening] : Patient declined vision screening [No falls in past year] : Patient reported no falls in the past year [Patient is independent with] : bathing [Independent] : transferring/mobility [Full assistance needed] : Assistance needed managing medications [] : Assistance needed managing finances. [Smoke Detector] : smoke detector [Carbon Monoxide Detector] : carbon monoxide detector [Grab Bars] : grab bars [0] : 1) Little interest or pleasure doing things: Not at all (0) [1] : 2) Feeling down, depressed, or hopeless for several days (1) [PHQ-2 Negative - No further assessment needed] : PHQ-2 Negative - No further assessment needed [With Patient/Caregiver] : , with patient/caregiver [FreeTextEntry1] : \par Dx w/ Dementia recently. Alot of anxiety in new/unfamiliar situations - IBS acts up. \par Wife goes out sometimes and pt home alone. Pt goes for walk alone in the [place] \par \par COGNITIVE CHANGES / [x ] MEMORY LOSS\par - Initial symptoms / Dx: First symptoms noted were memory loss approx 2017. STM progressively worse. Sometimes walks around the kitchen looking for the refrigerator. Stopped driving in Summer '21 per neuro recommendations. Reflexes are slower. Having hard time figuring out new remote. Was always a quiet person but now more quiet. \par \par - Appetite: If wife wouldn't make meals "he probably wouldn't eat".  \par - Motor Syx: denies\par Last fall on wet ground in [place] approx 2019 - slipped and hurt shoulder requiring shoulder sx - shoulder still bothers him and gets inj occasionally. Started acupuncture for shoulder - unclear if it's helping. \par \par - BPSD: sleeps approx 7hrs/night \par        [ ] Insomnia  \par        [ ] Fragmented sleep \par        [ ] Hypersomnia  \par        [ ] Sleep-wake cycle reversal \par \par       [ ] Depressed mood \par       [x ] Anxiety - mainly in new situations - when goes somewhere without wife e.g. with a friend, gets frustrated when can't find something.  Prior to memory problem onset was always anxious about finding a bathroom b/c of IBS problem. \par       [ ] Apathy/indifference  \par \par       [ ] Disinhibition  \par       [ ] Easily irritable  \par       [ ] Repetitive behavior / thoughts / speech\par       [x ] Difficulty learning new things\par       [ ] Wandering \par       [ ] Poor judgment\par       [ ] Self-neglect \par       [ ] Delusions\par       [ ] Hallucinations \par       [ ] Verbal Aggression  \par       [ ] Physical Aggression \par       [ ] Substance Abuse \par \par - Previous cognitive testing:  per neuro Dr. Bhat on 7/2/21: [facility] 8/30 \par - Labs: reviewe available labs in EMR \par - PET Brain FDG 7/19/21: s/o AD\par \par [ x] Neuro Dr. Bhat - visit note appreciated in EMR \par [ ] Psych\par \par - Previously on Namenda - stopped in July '21 wife states per neuro recs\par - Back on Aricept since July '21 - wife states makes sure he takes it after eating now and seems to be tolerating fine. Previously on Aricept in 2019 - was having GI symptoms so stopped. \par - On Buspirone since 9/20 with some improvement in anxiety. \par \par h/o CP \par - Follows w/ cards Dr. Dos Santos\par \par PMD Dr. Guzmán \par  [TextBox_31] : denies hearing problems  [TextBox_37] : Doesn't follow w/ optho, denies vision problems  [Guns at Home] : no guns at home [Night Light] : no night light [Driving Concerns] : not driving or driving without noted concerns [Getupandgo] : <12  [de-identified] : difficulty learning new cell phone, uses house phone ok  [de-identified] : wife does most of the cooking, pt bbqs sometimes  [de-identified] : sometimes needs help putting the right setting on the dryer  [FreeTextEntry6] : stopped driving in summer '21  [FreeTextEntry7] : wife fills pill box and reminds which day it is  [de-identified] : Wife manages finances alone since 2020  [de-identified] : stopped driving in summer '21  [SOC6Xwpzv] : 1 [AdvancecareDate] : 12/10/21  [FreeTextEntry4] : Wife states she is HCP and will bring previously completed form to next visit\par Has living will - wife will bring\par MOLST not on file - pending discussion

## 2021-12-19 NOTE — ASSESSMENT
[FreeTextEntry1] : - Education, counseling, and support provided\par - Dementia diagnosis and progression/prognosis discussed \par - Medications reviewed with CG and reconciled\par - Adv regularly stimulating activity - including cognitive and physical, and regular socialization\par - Would benefit from inc supervision for safety and help with ADLs\par - Safety concerns discussed \par - ADC program discussed and reading material about program provided. f/u with NITIN Lira as soon as able advised \par - Referred to  for additional counseling, education, support, resources\par \par Further cognitive and mood eval at next visit \par Will consider adjusting anxiety meds\par \par Rest as per PMD\par \par f/u with me PRN\par

## 2021-12-19 NOTE — REVIEW OF SYSTEMS
[As Noted in HPI] : as noted in HPI [Negative] : Heme/Lymph [Eyesight Problems] : no eyesight problems

## 2021-12-20 ENCOUNTER — NON-APPOINTMENT (OUTPATIENT)
Age: 71
End: 2021-12-20

## 2021-12-20 NOTE — ADDENDUM
[FreeTextEntry1] : Urinalysis is negative and the patient's wife states he is feeling much better and urinating better being on tamsulosin

## 2021-12-20 NOTE — HISTORY OF PRESENT ILLNESS
[FreeTextEntry8] : The patient presents with his wife with difficulty with urination for the past 2 days.\par He does have a history of BPH with LUTS previously been followed by urologist but not bad for about 2 years. He did have chronic symptoms but now worse over the past 2 days.\par Patient describes feeling the need to void but minimal urine coming out but at times the flow is better. Denies dysuria, hematuria, suprapubic pain, fever, chills or back pain.

## 2021-12-20 NOTE — PHYSICAL EXAM
[No Acute Distress] : no acute distress [Well-Appearing] : well-appearing [No Respiratory Distress] : no respiratory distress  [No Accessory Muscle Use] : no accessory muscle use [Clear to Auscultation] : lungs were clear to auscultation bilaterally [Normal Rate] : normal rate  [Regular Rhythm] : with a regular rhythm [No Edema] : there was no peripheral edema [Soft] : abdomen soft [Non Tender] : non-tender [Non-distended] : non-distended [No Masses] : no abdominal mass palpated [No HSM] : no HSM [No CVA Tenderness] : no CVA  tenderness [No Focal Deficits] : no focal deficits [Normal Affect] : the affect was normal [de-identified] : No suprapubic fullness or tenderness

## 2021-12-20 NOTE — PLAN
[FreeTextEntry1] : Signs and symptoms more compatible with worsening BPH with LUTS was a difficulty voiding but no sign of urinary retention.\par Must rule out UTI therefore UA and culture will be done. Patient unable to do it today therefore order put in for him to do it at the lab.\par Start 10 Zosyn 0.4 mg q h.s.

## 2021-12-21 LAB — BACTERIA UR CULT: NORMAL

## 2021-12-31 ENCOUNTER — RX RENEWAL (OUTPATIENT)
Age: 71
End: 2021-12-31

## 2022-01-06 ENCOUNTER — APPOINTMENT (OUTPATIENT)
Dept: GERIATRICS | Facility: CLINIC | Age: 72
End: 2022-01-06
Payer: MEDICARE

## 2022-01-06 VITALS
OXYGEN SATURATION: 99 % | BODY MASS INDEX: 22.67 KG/M2 | WEIGHT: 158 LBS | TEMPERATURE: 97.5 F | SYSTOLIC BLOOD PRESSURE: 108 MMHG | HEART RATE: 81 BPM | DIASTOLIC BLOOD PRESSURE: 70 MMHG | RESPIRATION RATE: 16 BRPM

## 2022-01-06 PROCEDURE — 99355: CPT

## 2022-01-06 PROCEDURE — 99354: CPT

## 2022-01-06 PROCEDURE — 99483 ASSMT & CARE PLN PT COG IMP: CPT

## 2022-01-07 ENCOUNTER — NON-APPOINTMENT (OUTPATIENT)
Age: 72
End: 2022-01-07

## 2022-01-08 ENCOUNTER — NON-APPOINTMENT (OUTPATIENT)
Age: 72
End: 2022-01-08

## 2022-01-11 ENCOUNTER — APPOINTMENT (OUTPATIENT)
Dept: GERIATRICS | Facility: CLINIC | Age: 72
End: 2022-01-11
Payer: MEDICARE

## 2022-01-11 PROCEDURE — 99443: CPT | Mod: 95

## 2022-01-13 ENCOUNTER — LABORATORY RESULT (OUTPATIENT)
Age: 72
End: 2022-01-13

## 2022-02-03 ENCOUNTER — APPOINTMENT (OUTPATIENT)
Dept: UROLOGY | Facility: CLINIC | Age: 72
End: 2022-02-03
Payer: MEDICARE

## 2022-02-03 VITALS
HEART RATE: 83 BPM | BODY MASS INDEX: 22.9 KG/M2 | DIASTOLIC BLOOD PRESSURE: 78 MMHG | HEIGHT: 70 IN | SYSTOLIC BLOOD PRESSURE: 123 MMHG | RESPIRATION RATE: 16 BRPM | WEIGHT: 160 LBS

## 2022-02-03 PROCEDURE — 99202 OFFICE O/P NEW SF 15 MIN: CPT

## 2022-02-03 PROCEDURE — A4218: CPT | Mod: NC

## 2022-02-03 PROCEDURE — 51700 IRRIGATION OF BLADDER: CPT

## 2022-02-05 ENCOUNTER — NON-APPOINTMENT (OUTPATIENT)
Age: 72
End: 2022-02-05

## 2022-02-06 NOTE — HISTORY OF PRESENT ILLNESS
[FreeTextEntry1] : Went into acute urinary retention while on vacation in [place].  Known BPH, Followed by Drs. Goldberg/ Magali  currently on tamsulosin .8.   [Urinary Retention] : urinary retention

## 2022-02-07 ENCOUNTER — NON-APPOINTMENT (OUTPATIENT)
Age: 72
End: 2022-02-07

## 2022-02-11 LAB
ANION GAP SERPL CALC-SCNC: 13 MMOL/L
BUN SERPL-MCNC: 18 MG/DL
CALCIUM SERPL-MCNC: 9.6 MG/DL
CHLORIDE SERPL-SCNC: 103 MMOL/L
CO2 SERPL-SCNC: 27 MMOL/L
CREAT SERPL-MCNC: 1.44 MG/DL
GLUCOSE SERPL-MCNC: 80 MG/DL
POTASSIUM SERPL-SCNC: 4.3 MMOL/L
SODIUM SERPL-SCNC: 143 MMOL/L

## 2022-02-14 ENCOUNTER — NON-APPOINTMENT (OUTPATIENT)
Age: 72
End: 2022-02-14

## 2022-02-15 ENCOUNTER — NON-APPOINTMENT (OUTPATIENT)
Age: 72
End: 2022-02-15

## 2022-02-15 ENCOUNTER — APPOINTMENT (OUTPATIENT)
Dept: GERIATRICS | Facility: CLINIC | Age: 72
End: 2022-02-15
Payer: MEDICARE

## 2022-02-15 PROCEDURE — 99443: CPT | Mod: 95

## 2022-02-16 ENCOUNTER — NON-APPOINTMENT (OUTPATIENT)
Age: 72
End: 2022-02-16

## 2022-02-18 ENCOUNTER — NON-APPOINTMENT (OUTPATIENT)
Age: 72
End: 2022-02-18

## 2022-02-18 ENCOUNTER — APPOINTMENT (OUTPATIENT)
Dept: CARDIOLOGY | Facility: CLINIC | Age: 72
End: 2022-02-18
Payer: MEDICARE

## 2022-02-18 VITALS
BODY MASS INDEX: 22.9 KG/M2 | TEMPERATURE: 98.1 F | WEIGHT: 160 LBS | HEART RATE: 77 BPM | OXYGEN SATURATION: 99 % | SYSTOLIC BLOOD PRESSURE: 100 MMHG | HEIGHT: 70 IN | DIASTOLIC BLOOD PRESSURE: 60 MMHG

## 2022-02-18 DIAGNOSIS — R07.89 OTHER CHEST PAIN: ICD-10-CM

## 2022-02-18 DIAGNOSIS — I65.23 OCCLUSION AND STENOSIS OF BILATERAL CAROTID ARTERIES: ICD-10-CM

## 2022-02-18 PROCEDURE — 93000 ELECTROCARDIOGRAM COMPLETE: CPT

## 2022-02-18 PROCEDURE — 99214 OFFICE O/P EST MOD 30 MIN: CPT

## 2022-02-18 RX ORDER — BUSPIRONE HYDROCHLORIDE 7.5 MG/1
7.5 TABLET ORAL
Qty: 180 | Refills: 1 | Status: DISCONTINUED | COMMUNITY
Start: 2020-09-29 | End: 2022-02-18

## 2022-02-23 ENCOUNTER — APPOINTMENT (OUTPATIENT)
Dept: CARDIOLOGY | Facility: CLINIC | Age: 72
End: 2022-02-23
Payer: MEDICARE

## 2022-02-23 PROCEDURE — 93306 TTE W/DOPPLER COMPLETE: CPT

## 2022-02-24 ENCOUNTER — NON-APPOINTMENT (OUTPATIENT)
Age: 72
End: 2022-02-24

## 2022-03-03 ENCOUNTER — APPOINTMENT (OUTPATIENT)
Dept: NEUROLOGY | Facility: CLINIC | Age: 72
End: 2022-03-03
Payer: MEDICARE

## 2022-03-03 ENCOUNTER — TRANSCRIPTION ENCOUNTER (OUTPATIENT)
Age: 72
End: 2022-03-03

## 2022-03-03 VITALS
SYSTOLIC BLOOD PRESSURE: 106 MMHG | WEIGHT: 160 LBS | HEART RATE: 73 BPM | DIASTOLIC BLOOD PRESSURE: 61 MMHG | HEIGHT: 70 IN | BODY MASS INDEX: 22.9 KG/M2

## 2022-03-03 DIAGNOSIS — R41.842 VISUOSPATIAL DEFICIT: ICD-10-CM

## 2022-03-03 PROCEDURE — 96116 NUBHVL XM PHYS/QHP 1ST HR: CPT | Mod: 59

## 2022-03-03 PROCEDURE — 99215 OFFICE O/P EST HI 40 MIN: CPT | Mod: 25

## 2022-03-14 ENCOUNTER — RX CHANGE (OUTPATIENT)
Age: 72
End: 2022-03-14

## 2022-03-22 ENCOUNTER — APPOINTMENT (OUTPATIENT)
Dept: INTERNAL MEDICINE | Facility: CLINIC | Age: 72
End: 2022-03-22
Payer: MEDICARE

## 2022-03-22 ENCOUNTER — NON-APPOINTMENT (OUTPATIENT)
Age: 72
End: 2022-03-22

## 2022-03-22 VITALS
WEIGHT: 166 LBS | SYSTOLIC BLOOD PRESSURE: 120 MMHG | DIASTOLIC BLOOD PRESSURE: 80 MMHG | OXYGEN SATURATION: 95 % | BODY MASS INDEX: 23.77 KG/M2 | TEMPERATURE: 97 F | RESPIRATION RATE: 13 BRPM | HEIGHT: 70 IN | HEART RATE: 84 BPM

## 2022-03-22 DIAGNOSIS — Z23 ENCOUNTER FOR IMMUNIZATION: ICD-10-CM

## 2022-03-22 PROCEDURE — 99214 OFFICE O/P EST MOD 30 MIN: CPT | Mod: 25

## 2022-03-22 PROCEDURE — 36415 COLL VENOUS BLD VENIPUNCTURE: CPT

## 2022-03-22 NOTE — ASSESSMENT
[FreeTextEntry1] : Venipuncture done in our office, Labs sent out/ further recommendations will be made based on lab results. Patient advised to continue present medications with diet/exercise and specialist followup. RTO in sept for CP\par \par discussed shingrix vaccine, +got covid vaccines X3\par colonoscopy was 8/16 and was normal-followup in 5years-Dr. Stevens=declines so FIT test given\par specialists include\par 1. cardiology prn-Dr. Ruano\par 2. urology-Dr. Gloria\par 3.dermatology q. year-Dr. Agustin\par 4.ophthalmology prn-Dr. Mcclelland\par 5.Neuro--Dr. Bhat\par 6.Ortho-Dr. Tang\par 7.Geriatrics NP-Dawna\par 24-hour urine done 9/2019=monitor serum creatinine\par Hep C screening in past was negative\par Cath 7/2018\par echo 2/2022\par \par \par \par \par

## 2022-03-22 NOTE — HISTORY OF PRESENT ILLNESS
[FreeTextEntry1] : Patient presents for followup on hypertension/hyperlipidemia/med check. Patient is currently fasting/no new issues.Patient is currently on quinapril for hypertension and on zetia for hyperlipidemia.\par -feels well\par -+got covid vaccines\par -on Zoloft for anxiety, had urinary retention with BuSpar\par - Following with neurology for dementia\par  Bilobed Transposition Flap Text: The defect edges were debeveled with a #15 scalpel blade.  Given the location of the defect and the proximity to free margins a bilobed transposition flap was deemed most appropriate.  Using a sterile surgical marker, an appropriate bilobe flap drawn around the defect.    The area thus outlined was incised deep to adipose tissue with a #15 scalpel blade.  The skin margins were undermined to an appropriate distance in all directions utilizing iris scissors.

## 2022-03-23 LAB
ALBUMIN SERPL ELPH-MCNC: 4.4 G/DL
ALP BLD-CCNC: 68 U/L
ALT SERPL-CCNC: 18 U/L
ANION GAP SERPL CALC-SCNC: 12 MMOL/L
AST SERPL-CCNC: 15 U/L
BASOPHILS # BLD AUTO: 0.04 K/UL
BASOPHILS NFR BLD AUTO: 0.8 %
BILIRUB SERPL-MCNC: 0.8 MG/DL
BUN SERPL-MCNC: 16 MG/DL
CALCIUM SERPL-MCNC: 9.2 MG/DL
CHLORIDE SERPL-SCNC: 102 MMOL/L
CHOLEST SERPL-MCNC: 224 MG/DL
CO2 SERPL-SCNC: 26 MMOL/L
CREAT SERPL-MCNC: 1.25 MG/DL
EGFR: 62 ML/MIN/1.73M2
EOSINOPHIL # BLD AUTO: 0.1 K/UL
EOSINOPHIL NFR BLD AUTO: 1.9 %
GLUCOSE SERPL-MCNC: 91 MG/DL
HCT VFR BLD CALC: 44.1 %
HDLC SERPL-MCNC: 88 MG/DL
HGB BLD-MCNC: 14.5 G/DL
IMM GRANULOCYTES NFR BLD AUTO: 0.2 %
LDLC SERPL CALC-MCNC: 122 MG/DL
LYMPHOCYTES # BLD AUTO: 1.3 K/UL
LYMPHOCYTES NFR BLD AUTO: 24.7 %
MAN DIFF?: NORMAL
MCHC RBC-ENTMCNC: 32.1 PG
MCHC RBC-ENTMCNC: 32.9 GM/DL
MCV RBC AUTO: 97.6 FL
MONOCYTES # BLD AUTO: 0.47 K/UL
MONOCYTES NFR BLD AUTO: 8.9 %
NEUTROPHILS # BLD AUTO: 3.34 K/UL
NEUTROPHILS NFR BLD AUTO: 63.5 %
NONHDLC SERPL-MCNC: 136 MG/DL
PLATELET # BLD AUTO: 329 K/UL
POTASSIUM SERPL-SCNC: 4.4 MMOL/L
PROT SERPL-MCNC: 6.6 G/DL
RBC # BLD: 4.52 M/UL
RBC # FLD: 12.8 %
SODIUM SERPL-SCNC: 140 MMOL/L
TRIGL SERPL-MCNC: 72 MG/DL
WBC # FLD AUTO: 5.26 K/UL

## 2022-03-24 ENCOUNTER — NON-APPOINTMENT (OUTPATIENT)
Age: 72
End: 2022-03-24

## 2022-04-13 ENCOUNTER — RX RENEWAL (OUTPATIENT)
Age: 72
End: 2022-04-13

## 2022-04-15 ENCOUNTER — APPOINTMENT (OUTPATIENT)
Dept: GERIATRICS | Facility: CLINIC | Age: 72
End: 2022-04-15
Payer: MEDICARE

## 2022-04-15 PROCEDURE — 99443: CPT | Mod: 95

## 2022-04-16 ENCOUNTER — RX RENEWAL (OUTPATIENT)
Age: 72
End: 2022-04-16

## 2022-04-26 ENCOUNTER — APPOINTMENT (OUTPATIENT)
Dept: DERMATOLOGY | Facility: CLINIC | Age: 72
End: 2022-04-26
Payer: MEDICARE

## 2022-04-26 PROCEDURE — 99213 OFFICE O/P EST LOW 20 MIN: CPT

## 2022-06-08 ENCOUNTER — NON-APPOINTMENT (OUTPATIENT)
Age: 72
End: 2022-06-08

## 2022-06-21 ENCOUNTER — APPOINTMENT (OUTPATIENT)
Dept: NEUROLOGY | Facility: CLINIC | Age: 72
End: 2022-06-21

## 2022-07-11 ENCOUNTER — RX RENEWAL (OUTPATIENT)
Age: 72
End: 2022-07-11

## 2022-08-15 ENCOUNTER — APPOINTMENT (OUTPATIENT)
Dept: GERIATRICS | Facility: CLINIC | Age: 72
End: 2022-08-15

## 2022-08-15 PROCEDURE — 99443: CPT | Mod: 95

## 2022-09-01 ENCOUNTER — APPOINTMENT (OUTPATIENT)
Dept: CARDIOLOGY | Facility: CLINIC | Age: 72
End: 2022-09-01

## 2022-10-13 PROBLEM — Z13.31 SCREENING FOR DEPRESSION: Status: ACTIVE | Noted: 2021-09-21

## 2022-10-17 ENCOUNTER — APPOINTMENT (OUTPATIENT)
Dept: ORTHOPEDIC SURGERY | Facility: CLINIC | Age: 72
End: 2022-10-17

## 2022-10-17 DIAGNOSIS — Z98.890 OTHER SPECIFIED POSTPROCEDURAL STATES: ICD-10-CM

## 2022-10-17 DIAGNOSIS — M75.101 UNSPECIFIED ROTATOR CUFF TEAR OR RUPTURE OF RIGHT SHOULDER, NOT SPECIFIED AS TRAUMATIC: ICD-10-CM

## 2022-10-17 PROCEDURE — 20611 DRAIN/INJ JOINT/BURSA W/US: CPT | Mod: RT

## 2022-10-17 PROCEDURE — 99214 OFFICE O/P EST MOD 30 MIN: CPT | Mod: 25

## 2022-10-17 NOTE — DISCUSSION/SUMMARY
[de-identified] : DARIUS is a 72 year male who presents today for follow up for post operative follow up status post right shoulder arthroscopy with massive rotator cuff repair. DOS: 1/10/2020.  At last appt on 12/2021 patient received R shoulder subacromial cortisone injection. He has been going to the gym. He states his right shoulder bothers him at night after days of increased activity. He presents for another cortisone injection today due to persisting stiffness. Patient denies any recent fevers, chills or night sweats. Patient denies any radiating pain, numbness, tingling sensations, burning sensations, urinary or bowel incontinence. \par \par We had a thorough discussion regarding the nature of his pain, the pathophysiology, as well as all treatment options. Pt due to his acute pain elected for a corticosteroid injection at today's visit and tolerated the procedure well. He should take it easy for the next 2-3 days while icing the joint. Conservative measures of treatment include rest until asymptomatic, activity avoidance, NSAID's PRN, application to ice to the area 2-3x daily for 20 minutes, with gradual return to activities. He will follow up as needed. All questions were answered and the patient verbalized understanding. The patient is in agreement with this treatment plan.

## 2022-10-17 NOTE — ADDENDUM
[FreeTextEntry1] : Documented by Kemi Baker acting as a scribe for Dr. Tang and Roberto Hernadez PA-C on 10/17/2022. \par \par All medical record entries made by the Scribe were at my, Dr. Tang, and Roberto Hernadez's, direction and\par personally dictated by me on 10/17/2022. I have reviewed the chart and agree that the record\par accurately reflects my personal performance of the history, physical exam, procedure and imaging.

## 2022-10-17 NOTE — PHYSICAL EXAM
[de-identified] : Physical Exam:\par General: Well appearing, no acute distress, A&O\par Neurologic: A&Ox3, No focal deficits\par Head: NCAT without abrasions, lacerations, or ecchymosis to head, face, or scalp\par Eyes: No scleral icterus, no gross abnormalities\par Respiratory: Equal chest wall expansion bilaterally, no accessory muscle use\par Skin: no erythema\par Psychiatric: Normal mood and affect\par \par Right shoulder: Forward flexion 160 degrees, external rotation 35 degrees w/ resistance, internal rotation to L5.  He does complain of some mild pain with terminal flexion and abduction. Negative painful arc.  Negative drop arm side.  Negative Jennifer empty can test.  Negative Hornblower sign.  No external rotation lag noted. Strength 5/5. \par \par Left shoulder:Forward flexion 170 degrees, external rotation 45 degrees, internal rotation to L1.  Abduction 150 degrees.  Negative painful arc.  Negative drop arm side.  Negative Jennifer empty can test.  Negative Hornblower sign.  No external rotation lag noted.

## 2022-10-17 NOTE — HISTORY OF PRESENT ILLNESS
[2] : an average pain level of 2/10 [de-identified] : DARIUS is a 72 year male who presents today for follow up for post operative follow up status post right shoulder arthroscopy with massive rotator cuff repair. DOS: 1/10/2020.  At last appt on 12/2021 patient received R shoulder subacromial cortisone injection. He has been going to the gym. He states his right shoulder bothers him at night after days of increased activity. He presents for another cortisone injection today due to persisting stiffness. Patient denies any recent fevers, chills or night sweats. Patient denies any radiating pain, numbness, tingling sensations, burning sensations, urinary or bowel incontinence. \par

## 2022-10-17 NOTE — PROCEDURE
[de-identified] : US guided Injection: Right shoulder (Subacromial). \par Indication: Shoulder stiffness\par \par A discussion was had with the patient regarding this procedure and all questions were answered. All risks, benefits and alternatives were discussed. These included but were not limited to bleeding, infection, and allergic reaction. Alcohol was used to clean the skin, and betadine was used to sterilize and prep the area in the posterior aspect of the right shoulder. Ethyl chloride spray was then used as a topical anesthetic. A 22-gauge needle was used to inject 3cc 1% xylocaine, 2cc 0.25% bupivacaine and 1cc of 40mg/mL triamcinolone acetonide into the right subacromial space. An ultrasound guidance was used for adequate placement of needle. A sterile bandage was then applied. The patient tolerated the procedure well and there were no complications.

## 2022-10-19 ENCOUNTER — APPOINTMENT (OUTPATIENT)
Dept: INTERNAL MEDICINE | Facility: CLINIC | Age: 72
End: 2022-10-19

## 2022-10-19 VITALS
RESPIRATION RATE: 14 BRPM | SYSTOLIC BLOOD PRESSURE: 120 MMHG | DIASTOLIC BLOOD PRESSURE: 70 MMHG | WEIGHT: 176 LBS | OXYGEN SATURATION: 98 % | HEIGHT: 70 IN | BODY MASS INDEX: 25.2 KG/M2 | HEART RATE: 72 BPM

## 2022-10-19 DIAGNOSIS — Z23 ENCOUNTER FOR IMMUNIZATION: ICD-10-CM

## 2022-10-19 PROCEDURE — 36415 COLL VENOUS BLD VENIPUNCTURE: CPT

## 2022-10-19 PROCEDURE — 90662 IIV NO PRSV INCREASED AG IM: CPT

## 2022-10-19 PROCEDURE — G0008: CPT

## 2022-10-19 PROCEDURE — G0439: CPT

## 2022-10-19 RX ORDER — FAMOTIDINE 20 MG/1
20 TABLET, FILM COATED ORAL
Qty: 90 | Refills: 0 | Status: DISCONTINUED | COMMUNITY
Start: 2022-02-18 | End: 2022-10-19

## 2022-10-19 RX ORDER — FAMOTIDINE 20 MG/1
20 TABLET, FILM COATED ORAL
Qty: 90 | Refills: 1 | Status: ACTIVE | COMMUNITY
Start: 2022-10-19

## 2022-10-19 NOTE — HISTORY OF PRESENT ILLNESS
[FreeTextEntry1] : 72-year-old male with chronic medical issues including hypertension for which he takes Quinipril and BPH with erectile dysfunction for which he follows with urologist Dr. Santiago presents for his yearly physical.\par \par The patient has been having memory difficulties starting about 2017 and saw neurology 2019 with diagnosis of memory impairment which was mild.\par Workup included blood work which was normal an MRI showing microvascular changes.\par He was started on Memantine which he has been on since 2019 and feels his memory is about the same without any improvement or worsening.\par He has since followed up with neurology with testing positive for Alzheimer's disease with added Aricept.Namenda has been discontinued\par The patient's wife feels his memory is slowly worsening\par Also on 81 mg aspirin daily.\par \par Also with hyperlipidemia previously on atorvastatin but now on Zetia.\par unclear reason why and who stopped atorvastatin.\par \par The patient does run a mildly elevated creatinine with 24-hour urine creatinine clearance September 2019 = 81 mL/minute.\par \par July 2018 the patient had chest pain while at the gym therefore went to the ER with evaluation showing negative cardiac enzymes.\par Cardiac catheterization was normal with no CAD.\par No issues since.\par His wife reports the patient follow up with cardiology last week\par \par Patient with BPH and elevated PSA following with urology on tamsulosin.\par \par He had had increased anxiety therefore was put on BuSpar with some help but caused urinary issues therefore this was discontinued and he also had some associated depression and now is on Zoloft with benefit\par \par he continues to exercise but increase p.o. intake and 16 pounds of weight gain since last year.\par \par patient with some reactive anxiety for which he is on BuSpar with benefit.\par \par  the patient had right shoulder rotator cuff tear and arthroscopic surgery January 2020 with good results.

## 2022-10-19 NOTE — HEALTH RISK ASSESSMENT
[Never] : Never [Yes] : Yes [1 or 2 (0 pts)] : 1 or 2 (0 points) [Never (0 pts)] : Never (0 points) [No] : In the past 12 months have you used drugs other than those required for medical reasons? No [No falls in past year] : Patient reported no falls in the past year [0] : 2) Feeling down, depressed, or hopeless: Not at all (0) [None] : None [With Significant Other] : lives with significant other [# of Members in Household ___] :  household currently consist of [unfilled] member(s) [Retired] : retired [College] : College [] :  [# Of Children ___] : has [unfilled] children [Sexually Active] : sexually active [Feels Safe at Home] : Feels safe at home [Fully functional (bathing, dressing, toileting, transferring, walking, feeding)] : Fully functional (bathing, dressing, toileting, transferring, walking, feeding) [Fully functional (using the telephone, shopping, preparing meals, housekeeping, doing laundry, using] : Fully functional and needs no help or supervision to perform IADLs (using the telephone, shopping, preparing meals, housekeeping, doing laundry, using transportation, managing medications and managing finances) [Independent] : doing laundry [Some assistance needed] : preparing meals [Full assistance needed] : managing finances [Smoke Detector] : smoke detector [Carbon Monoxide Detector] : carbon monoxide detector [Seat Belt] :  uses seat belt [Discussed at today's visit] : Advance Directives Discussed at today's visit [Designated Healthcare Proxy] : Designated healthcare proxy [Name: ___] : Health Care Proxy's Name: [unfilled]  [Good] : ~his/her~  mood as  good [4 or more  times a week (4 pts)] : 4 or more  times a week (4 points) [PHQ-2 Negative - No further assessment needed] : PHQ-2 Negative - No further assessment needed [Reviewed no changes] : Reviewed, no changes [Audit-CScore] : 4 [de-identified] : exercises [de-identified] : good [[id number]] : 0 [Change in mental status noted] : No change in mental status noted [Reports changes in hearing] : Reports no changes in hearing [Reports changes in vision] : Reports no changes in vision [Reports changes in dental health] : Reports no changes in dental health [Guns at Home] : no guns at home [Sunscreen] : does not use sunscreen [FreeTextEntry2] :  [de-identified] : recommended sunscreen [AdvancecareDate] : 10/22

## 2022-10-19 NOTE — DATA REVIEWED
[FreeTextEntry1] : Echocardiogram April 2021 with normal LVEF with aortic sclerosis without stenosis.\par Carotid duplex with plaque without stenosis.\par 
normal...

## 2022-10-19 NOTE — COUNSELING
[None] : None [Good understanding] : Patient has a good understanding of lifestyle changes and the steps needed to achieve self management goals [de-identified] : continue diet and exercise

## 2022-10-19 NOTE — ASSESSMENT
[FreeTextEntry1] : 72-year-old male currently medically stable with controlled hypertension on present medications without any indication of active medical issues.\par \par Patient with progressive memory issues with neurological evaluation diagnosed with Alzheimer's disease.\par Continue present treatment with followup with neurology.\par \par The patient is status post substernal chest pain July 2018 with cardiac catheterization improving he has no CAD\par Echocardiogram April 2021 essentially normal.\par carotid duplex with plaque without stenosis..\par Followed with cardiology\par \par BPH with urinary symptoms on tamsulosin followed by urology\par \par Patient with weight gain therefore continue exercise and improvement of diet\par  continue present medications\par \par chronic anxiety benefitted by BuSpar BuSpar \par \par Colonoscopy August 2016 with followup in 5 years. Decines. FIT was given\par \par Pneumococcal vaccine received. Recommend Prevnar 20\par High-dose influenza vaccine given left deltoid\par Recieved the COVID vaccine\par Shingrix discussed\par \par Venipuncture done today in the office\par Followup in 6 moths

## 2022-10-19 NOTE — PHYSICAL EXAM
[General Appearance - Alert] : alert [General Appearance - In No Acute Distress] : in no acute distress [Sclera] : the sclera and conjunctiva were normal [PERRL With Normal Accommodation] : pupils were equal in size, round, and reactive to light [Extraocular Movements] : extraocular movements were intact [Outer Ear] : the ears and nose were normal in appearance [Oropharynx] : the oropharynx was normal [Neck Appearance] : the appearance of the neck was normal [Neck Cervical Mass (___cm)] : no neck mass was observed [Jugular Venous Distention Increased] : there was no jugular-venous distention [Thyroid Diffuse Enlargement] : the thyroid was not enlarged [Thyroid Nodule] : there were no palpable thyroid nodules [Auscultation Breath Sounds / Voice Sounds] : lungs were clear to auscultation bilaterally [Heart Rate And Rhythm] : heart rate was normal and rhythm regular [Heart Sounds] : normal S1 and S2 [Heart Sounds Gallop] : no gallops [Murmurs] : no murmurs [Heart Sounds Pericardial Friction Rub] : no pericardial rub [Arterial Pulses Carotid] : carotid pulses were normal with no bruits [Abdominal Aorta] : the abdominal aorta was normal [Arterial Pulses Femoral] : femoral pulses were normal without bruits [Full Pulse] : the pedal pulses are present [Edema] : there was no peripheral edema [Veins - Varicosity Changes] : there were no varicosital changes [Bowel Sounds] : normal bowel sounds [Abdomen Soft] : soft [Abdomen Tenderness] : non-tender [Abdomen Mass (___ Cm)] : no abdominal mass palpated [Patient Refused] : rectal exam was refused by the patient [Cervical Lymph Nodes Enlarged Posterior Bilaterally] : posterior cervical [Cervical Lymph Nodes Enlarged Anterior Bilaterally] : anterior cervical [Supraclavicular Lymph Nodes Enlarged Bilaterally] : supraclavicular [Axillary Lymph Nodes Enlarged Bilaterally] : axillary [Femoral Lymph Nodes Enlarged Bilaterally] : femoral [Inguinal Lymph Nodes Enlarged Bilaterally] : inguinal [No Spinal Tenderness] : no spinal tenderness [Abnormal Walk] : normal gait [Nail Clubbing] : no clubbing  or cyanosis of the fingernails [Musculoskeletal - Swelling] : no joint swelling seen [Motor Tone] : muscle strength and tone were normal [Skin Color & Pigmentation] : normal skin color and pigmentation [Skin Turgor] : normal skin turgor [] : no rash [Cranial Nerves] : cranial nerves 2-12 were intact [No Focal Deficits] : no focal deficits [Impaired Insight] : insight and judgment were intact [Affect] : the affect was normal [FreeTextEntry1] : By urology [Over the Past 2 Weeks, Have You Felt Down, Depressed, or Hopeless?] : 1.) Over the past 2 weeks, have you felt down, depressed, or hopeless? No [Over the Past 2 Weeks, Have You Felt Little Interest or Pleasure Doing Things?] : 2.) Over the past 2 weeks, have you felt little interest or pleasure doing things? No

## 2022-10-19 NOTE — REVIEW OF SYSTEMS
[see HPI] : see HPI [Anxiety] : anxiety [Negative] : Heme/Lymph [Suicidal] : not suicidal [Sleep Disturbances] : no sleep disturbances [Depression] : no depression [Change In Personality] : no personality change [Emotional Problems] : no emotional problems

## 2022-10-20 ENCOUNTER — NON-APPOINTMENT (OUTPATIENT)
Age: 72
End: 2022-10-20

## 2022-10-20 LAB
ALBUMIN SERPL ELPH-MCNC: 4.6 G/DL
ALP BLD-CCNC: 60 U/L
ALT SERPL-CCNC: 24 U/L
ANION GAP SERPL CALC-SCNC: 11 MMOL/L
AST SERPL-CCNC: 23 U/L
BASOPHILS # BLD AUTO: 0.05 K/UL
BASOPHILS NFR BLD AUTO: 0.7 %
BILIRUB SERPL-MCNC: 0.6 MG/DL
BUN SERPL-MCNC: 28 MG/DL
CALCIUM SERPL-MCNC: 9.6 MG/DL
CHLORIDE SERPL-SCNC: 107 MMOL/L
CHOLEST SERPL-MCNC: 210 MG/DL
CO2 SERPL-SCNC: 24 MMOL/L
CREAT SERPL-MCNC: 1.45 MG/DL
EGFR: 51 ML/MIN/1.73M2
EOSINOPHIL # BLD AUTO: 0.07 K/UL
EOSINOPHIL NFR BLD AUTO: 1 %
GLUCOSE SERPL-MCNC: 98 MG/DL
HCT VFR BLD CALC: 44 %
HDLC SERPL-MCNC: 88 MG/DL
HGB BLD-MCNC: 14.8 G/DL
IMM GRANULOCYTES NFR BLD AUTO: 0.1 %
LDLC SERPL CALC-MCNC: 109 MG/DL
LYMPHOCYTES # BLD AUTO: 1.47 K/UL
LYMPHOCYTES NFR BLD AUTO: 20.4 %
MAN DIFF?: NORMAL
MCHC RBC-ENTMCNC: 32.5 PG
MCHC RBC-ENTMCNC: 33.6 GM/DL
MCV RBC AUTO: 96.7 FL
MONOCYTES # BLD AUTO: 0.71 K/UL
MONOCYTES NFR BLD AUTO: 9.8 %
NEUTROPHILS # BLD AUTO: 4.91 K/UL
NEUTROPHILS NFR BLD AUTO: 68 %
NONHDLC SERPL-MCNC: 122 MG/DL
PLATELET # BLD AUTO: 282 K/UL
POTASSIUM SERPL-SCNC: 5 MMOL/L
PROT SERPL-MCNC: 6.9 G/DL
PSA SERPL-MCNC: 3.23 NG/ML
RBC # BLD: 4.55 M/UL
RBC # FLD: 12.2 %
SODIUM SERPL-SCNC: 142 MMOL/L
TRIGL SERPL-MCNC: 64 MG/DL
WBC # FLD AUTO: 7.22 K/UL

## 2022-11-16 ENCOUNTER — APPOINTMENT (OUTPATIENT)
Dept: GERIATRICS | Facility: CLINIC | Age: 72
End: 2022-11-16

## 2022-11-16 PROCEDURE — 99443: CPT | Mod: 95

## 2022-11-30 ENCOUNTER — NON-APPOINTMENT (OUTPATIENT)
Age: 72
End: 2022-11-30

## 2022-12-01 ENCOUNTER — APPOINTMENT (OUTPATIENT)
Dept: NEUROLOGY | Facility: CLINIC | Age: 72
End: 2022-12-01

## 2022-12-01 VITALS
HEIGHT: 70 IN | HEART RATE: 69 BPM | DIASTOLIC BLOOD PRESSURE: 90 MMHG | WEIGHT: 180 LBS | BODY MASS INDEX: 25.77 KG/M2 | SYSTOLIC BLOOD PRESSURE: 156 MMHG

## 2022-12-01 PROCEDURE — 99215 OFFICE O/P EST HI 40 MIN: CPT

## 2022-12-01 NOTE — DATA REVIEWED
[de-identified] : FDG PET 7/2021-"IMPRESSION:\par \par Abnormal hypometabolism particularly pronounced in the parietotemporal and frontal regions, and also extending into the lateral occipital lobes, findings consistent with advanced neurodegenerative disease. Please note, given prominent involvement of the precuneus and posterior cingulate gyrus, Alzheimer's disease is favored.\par \par Alternatively, given mild hypometabolism in the cuneus and primary visual cortex, dementia with Lewy bodies is also a possibility, however, this is considered less likely. DaTscan or FDOPA PET/MRI and/or amyloid PET scan may be obtained to differentiate between these etiologies if clinically indicated.\par \par Correlation with clinical exam and neuropsychological assessment is advised."\par

## 2022-12-01 NOTE — HISTORY OF PRESENT ILLNESS
[FreeTextEntry1] : HPI (initial visit Dec 01, 2022)- DARIUS SORTO is a 72 year old right handed man with late onset Alzheimer dementia, here for a follow up visit. He was previously a patient of Dr. Clair Bhat and I am seeing him for the first time. He has had progressive cognitive decline since approx 2017, predominately affecting memory. Initially diagnosed with MCI in 2019 and now with mild-mod stage dementia since 7/2021. Neuropsychological testing and FDG PET scan both consistent with AD. He is on Aricept 5 mg BID (due to GI upset). He is followed by [facility] ADC program. \par \par He is accompanied by wife, Desirae. They recently traveled to [place]. Recently returned from a [place] cruise. He has been doing well. Stable. "Mild decline"- both cognitive and functional decline. Wife labels his drawers. Wife helping with places clothes out, as it takes him a long time. Can forget where things are in the house or gets lost in the house. He fidgets with his hands and can get restless. He is good with faces and names. Tolerating Aricept okay. No recent illness. No wandering. Sleeping good at night, "I sleep like a rock". He is not driving.

## 2022-12-01 NOTE — PHYSICAL EXAM
[Total Score ___ / 30] : the patient achieved a score of [unfilled] /30 [Date / Time ___ / 5] : date / time [unfilled] / 5 [Place ___ / 5] : place [unfilled] / 5 [Registration ___ / 3] : registration [unfilled] / 3 [Serial Sevens ___/5] : serial sevens [unfilled] / 5 [Naming 2 Objects ___ / 2] : naming two objects [unfilled] / 2 [Repeating a Sentence ___ / 1] : repeating a sentence [unfilled] / 1 [Writing a Sentence ___ / 1] : write sentence [unfilled] / 1 [3-stage Verbal Command ___ / 3] : three-stage verbal command [unfilled] / 3 [Written Command ___ / 1] : written command [unfilled] / 1 [Copy a Design ___ / 1] : copy a design [unfilled] / 1 [Recall ___ / 3] : recall [unfilled] / 3 [FreeTextEntry1] : No facial asymmetry\par No dysarthria\par No drift in UE or LE. Moves all extremities symmetrically\par Gait is steady. Normal arm swing.

## 2022-12-01 NOTE — ASSESSMENT
[FreeTextEntry1] : Alzheimer dementia- mild/mod stage\par \par Continue Aricept 5 mg BID\par Continue to follow with ADC program\par \par Counselled on the diagnosis of AD, and reviewed its natural history. As the disease progresses, there is increasing impairment in cognitive functioning, including memory, language, and executive functioning. Behavioral changes also progress as the disease advances, as does one’s ability to function independently; eventually requiring full time assistance with daily needs. Counselled on safety and future planning. There is no curative treatment, however there are therapies available that can help temporarily mitigate cognitive symptoms of AD, but do not halt the overall progression of disease. Discussed strategies for maintaining cognitive health (encouraged healthy eating habits, routine physical exercise, social interaction and cognitive stimulations (reading, word puzzles)). Counselled on management of vascular risk factors. \par \par f/u in 6 months\par \par \par

## 2022-12-07 ENCOUNTER — NON-APPOINTMENT (OUTPATIENT)
Age: 72
End: 2022-12-07

## 2022-12-10 LAB
APPEARANCE: CLEAR
BACTERIA: NEGATIVE
BILIRUBIN URINE: NEGATIVE
BLOOD URINE: NEGATIVE
COLOR: NORMAL
GLUCOSE QUALITATIVE U: NEGATIVE
HYALINE CASTS: 0 /LPF
KETONES URINE: NEGATIVE
LEUKOCYTE ESTERASE URINE: NEGATIVE
MICROSCOPIC-UA: NORMAL
NITRITE URINE: NEGATIVE
PH URINE: 5.5
PROTEIN URINE: NEGATIVE
RED BLOOD CELLS URINE: 1 /HPF
SPECIFIC GRAVITY URINE: 1.02
SQUAMOUS EPITHELIAL CELLS: 0 /HPF
UROBILINOGEN URINE: NORMAL
WHITE BLOOD CELLS URINE: 0 /HPF

## 2022-12-12 ENCOUNTER — NON-APPOINTMENT (OUTPATIENT)
Age: 72
End: 2022-12-12

## 2023-01-09 ENCOUNTER — RX RENEWAL (OUTPATIENT)
Age: 73
End: 2023-01-09

## 2023-01-09 ENCOUNTER — NON-APPOINTMENT (OUTPATIENT)
Age: 73
End: 2023-01-09

## 2023-01-10 ENCOUNTER — NON-APPOINTMENT (OUTPATIENT)
Age: 73
End: 2023-01-10

## 2023-01-23 ENCOUNTER — NON-APPOINTMENT (OUTPATIENT)
Age: 73
End: 2023-01-23

## 2023-01-23 ENCOUNTER — APPOINTMENT (OUTPATIENT)
Dept: GERIATRICS | Facility: CLINIC | Age: 73
End: 2023-01-23

## 2023-01-23 ENCOUNTER — APPOINTMENT (OUTPATIENT)
Dept: GERIATRICS | Facility: CLINIC | Age: 73
End: 2023-01-23
Payer: MEDICARE

## 2023-01-23 VITALS
RESPIRATION RATE: 16 BRPM | HEART RATE: 69 BPM | OXYGEN SATURATION: 98 % | DIASTOLIC BLOOD PRESSURE: 78 MMHG | SYSTOLIC BLOOD PRESSURE: 131 MMHG | BODY MASS INDEX: 26.65 KG/M2 | HEIGHT: 70 IN | WEIGHT: 186.13 LBS | TEMPERATURE: 97.5 F

## 2023-01-23 PROCEDURE — 99483 ASSMT & CARE PLN PT COG IMP: CPT

## 2023-01-23 PROCEDURE — G2212 PROLONG OUTPT/OFFICE VIS: CPT

## 2023-03-15 ENCOUNTER — NON-APPOINTMENT (OUTPATIENT)
Age: 73
End: 2023-03-15

## 2023-03-17 ENCOUNTER — RX RENEWAL (OUTPATIENT)
Age: 73
End: 2023-03-17

## 2023-03-21 ENCOUNTER — NON-APPOINTMENT (OUTPATIENT)
Age: 73
End: 2023-03-21

## 2023-03-22 ENCOUNTER — NON-APPOINTMENT (OUTPATIENT)
Age: 73
End: 2023-03-22

## 2023-04-04 ENCOUNTER — RX RENEWAL (OUTPATIENT)
Age: 73
End: 2023-04-04

## 2023-04-04 NOTE — ASU PREOP CHECKLIST - BOWEL PREP
Discussed with Dr. Rivas while patient was still here. Patient still quiet hypertensive. We will start Amlodipine 5 mg daily, with a repeat BP check in 3 weeks. Patient will also get another BMP to check Potassium, as was on the upper end of normal.     Will see patient for nurse visit on 4/25/2023 at 9:30A. He will get the BMP on 4/24/2023.    He is aware of these recommendations, verbalized an understanding and has no additional questions or concerns at this time.    n/a

## 2023-04-17 ENCOUNTER — APPOINTMENT (OUTPATIENT)
Dept: INTERNAL MEDICINE | Facility: CLINIC | Age: 73
End: 2023-04-17
Payer: MEDICARE

## 2023-04-17 VITALS
WEIGHT: 190 LBS | RESPIRATION RATE: 13 BRPM | HEIGHT: 70 IN | BODY MASS INDEX: 27.2 KG/M2 | DIASTOLIC BLOOD PRESSURE: 80 MMHG | SYSTOLIC BLOOD PRESSURE: 130 MMHG | HEART RATE: 68 BPM | OXYGEN SATURATION: 98 %

## 2023-04-17 DIAGNOSIS — R97.20 ELEVATED PROSTATE, SPECIFIC ANTIGEN [PSA]: ICD-10-CM

## 2023-04-17 DIAGNOSIS — S76.219A STRAIN OF ADDUCTOR MUSCLE, FASCIA AND TENDON OF UNSPECIFIED THIGH, INITIAL ENCOUNTER: ICD-10-CM

## 2023-04-17 PROCEDURE — 99214 OFFICE O/P EST MOD 30 MIN: CPT

## 2023-04-17 RX ORDER — CHLORHEXIDINE GLUCONATE, 0.12% ORAL RINSE 1.2 MG/ML
0.12 SOLUTION DENTAL
Qty: 473 | Refills: 0 | Status: DISCONTINUED | COMMUNITY
Start: 2022-03-07 | End: 2023-04-17

## 2023-04-19 ENCOUNTER — APPOINTMENT (OUTPATIENT)
Dept: INTERNAL MEDICINE | Facility: CLINIC | Age: 73
End: 2023-04-19

## 2023-04-24 RX ORDER — DONEPEZIL HYDROCHLORIDE 5 MG/1
5 TABLET ORAL
Qty: 180 | Refills: 3 | Status: ACTIVE | COMMUNITY
Start: 2021-07-20 | End: 1900-01-01

## 2023-04-27 ENCOUNTER — RX RENEWAL (OUTPATIENT)
Age: 73
End: 2023-04-27

## 2023-04-27 ENCOUNTER — APPOINTMENT (OUTPATIENT)
Dept: DERMATOLOGY | Facility: CLINIC | Age: 73
End: 2023-04-27

## 2023-04-28 ENCOUNTER — APPOINTMENT (OUTPATIENT)
Dept: DERMATOLOGY | Facility: CLINIC | Age: 73
End: 2023-04-28
Payer: MEDICARE

## 2023-04-28 ENCOUNTER — TRANSCRIPTION ENCOUNTER (OUTPATIENT)
Age: 73
End: 2023-04-28

## 2023-04-28 LAB
ALBUMIN SERPL ELPH-MCNC: 4.4 G/DL
ALP BLD-CCNC: 67 U/L
ALT SERPL-CCNC: 20 U/L
ANION GAP SERPL CALC-SCNC: 13 MMOL/L
AST SERPL-CCNC: 19 U/L
BASOPHILS # BLD AUTO: 0.07 K/UL
BASOPHILS NFR BLD AUTO: 1.1 %
BILIRUB SERPL-MCNC: 0.7 MG/DL
BUN SERPL-MCNC: 34 MG/DL
CALCIUM SERPL-MCNC: 9.1 MG/DL
CHLORIDE SERPL-SCNC: 105 MMOL/L
CHOLEST SERPL-MCNC: 230 MG/DL
CO2 SERPL-SCNC: 24 MMOL/L
CREAT SERPL-MCNC: 1.5 MG/DL
EGFR: 49 ML/MIN/1.73M2
EOSINOPHIL # BLD AUTO: 0.16 K/UL
EOSINOPHIL NFR BLD AUTO: 2.5 %
GLUCOSE SERPL-MCNC: 95 MG/DL
HCT VFR BLD CALC: 45.4 %
HDLC SERPL-MCNC: 83 MG/DL
HGB BLD-MCNC: 14.7 G/DL
IMM GRANULOCYTES NFR BLD AUTO: 0.9 %
LDLC SERPL CALC-MCNC: 130 MG/DL
LYMPHOCYTES # BLD AUTO: 1.62 K/UL
LYMPHOCYTES NFR BLD AUTO: 25 %
MAN DIFF?: NORMAL
MCHC RBC-ENTMCNC: 32.4 GM/DL
MCHC RBC-ENTMCNC: 32.4 PG
MCV RBC AUTO: 100 FL
MONOCYTES # BLD AUTO: 0.72 K/UL
MONOCYTES NFR BLD AUTO: 11.1 %
NEUTROPHILS # BLD AUTO: 3.85 K/UL
NEUTROPHILS NFR BLD AUTO: 59.4 %
NONHDLC SERPL-MCNC: 147 MG/DL
PLATELET # BLD AUTO: 279 K/UL
POTASSIUM SERPL-SCNC: 5.2 MMOL/L
PROT SERPL-MCNC: 6.6 G/DL
PSA SERPL-MCNC: 6.99 NG/ML
RBC # BLD: 4.54 M/UL
RBC # FLD: 12.5 %
SODIUM SERPL-SCNC: 141 MMOL/L
TRIGL SERPL-MCNC: 83 MG/DL
WBC # FLD AUTO: 6.48 K/UL

## 2023-04-28 PROCEDURE — 99213 OFFICE O/P EST LOW 20 MIN: CPT | Mod: 25

## 2023-04-28 PROCEDURE — 17000 DESTRUCT PREMALG LESION: CPT

## 2023-04-28 NOTE — ASSESSMENT
[FreeTextEntry1] : Rx given to have blood work done at the lab per patient and wife request.  Supportive therapy i.e. Tylenol advised. Offered patient imaging.  Call with any issues and return to the office as scheduled for regular follow-up\par \par Dr. Guzmán was present in office building while I examined patient\par

## 2023-04-28 NOTE — PHYSICAL EXAM
[No Acute Distress] : no acute distress [No Respiratory Distress] : no respiratory distress  [Clear to Auscultation] : lungs were clear to auscultation bilaterally [Normal Rate] : normal rate  [Regular Rhythm] : with a regular rhythm [Normal Affect] : the affect was normal [Normal Mood] : the mood was normal [Soft] : abdomen soft [Non Tender] : non-tender [Normal Bowel Sounds] : normal bowel sounds [de-identified] : No obvious hernia on exam

## 2023-04-28 NOTE — ADDENDUM
[FreeTextEntry1] : Spoke with patient's wife on 4/27 who states his groin pain has not recurred\par \par Labs show\par -PSA of 6.99\par -BUN/Creatinine of 34/1.50\par repeat in 1month

## 2023-04-28 NOTE — HISTORY OF PRESENT ILLNESS
[FreeTextEntry8] : Patient presents with wife stating he developed right groin pain yesterday after lifting a heavy planter.  Patient took OTC pain reliever with benefit.  Patient is questioning if he has a hernia.  Patient has noticed no bulge in the area and has no associated nausea/vomiting/change in bowel/urinary symptoms or fever.  Patient states he feels no symptoms at this time\par \par Patient has appointment in 2 days and wife would like Rx to have blood work done at the lab

## 2023-05-01 ENCOUNTER — RX RENEWAL (OUTPATIENT)
Age: 73
End: 2023-05-01

## 2023-05-02 ENCOUNTER — NON-APPOINTMENT (OUTPATIENT)
Age: 73
End: 2023-05-02

## 2023-05-04 ENCOUNTER — APPOINTMENT (OUTPATIENT)
Dept: UROLOGY | Facility: CLINIC | Age: 73
End: 2023-05-04
Payer: MEDICARE

## 2023-05-04 PROCEDURE — 99213 OFFICE O/P EST LOW 20 MIN: CPT

## 2023-05-04 PROCEDURE — 51798 US URINE CAPACITY MEASURE: CPT

## 2023-05-04 NOTE — ASSESSMENT
[FreeTextEntry1] : has > 100cc in bladder, unable to void here, advised to return to tamsulosin .8. Options for elevated PSA discussed including prostate MRI and possible fusion biopsy.  RDP referral completed.  All questions answered and f/u advised.

## 2023-05-04 NOTE — HISTORY OF PRESENT ILLNESS
[FreeTextEntry1] : Seen last year for urinary retention, Taking tamsulosin .4 referred for elevated psa and decreased renal function [Urinary Retention] : no urinary retention [Urinary Frequency] : urinary frequency [Nocturia] : nocturia [Straining] : no straining [Weak Stream] : no weak stream [Dysuria] : no dysuria [Hematuria - Gross] : no gross hematuria [None] : None /

## 2023-05-04 NOTE — PHYSICAL EXAM
[General Appearance - Well Developed] : well developed [General Appearance - Well Nourished] : well nourished [General Appearance - In No Acute Distress] : no acute distress [Urethral Meatus] : meatus normal [Prostate Size ___ (0-4)] : prostate size [unfilled] (scale: 0-4) [] : no respiratory distress [FreeTextEntry1] : anxious

## 2023-05-08 ENCOUNTER — APPOINTMENT (OUTPATIENT)
Dept: UROLOGY | Facility: CLINIC | Age: 73
End: 2023-05-08

## 2023-05-08 ENCOUNTER — APPOINTMENT (OUTPATIENT)
Dept: UROLOGY | Facility: CLINIC | Age: 73
End: 2023-05-08
Payer: MEDICARE

## 2023-05-08 PROCEDURE — 99203 OFFICE O/P NEW LOW 30 MIN: CPT | Mod: 95

## 2023-05-08 NOTE — REVIEW OF SYSTEMS
[Red Eyes] : red eyes [see HPI] : see HPI [Joint Pain] : joint pain [Anxiety] : anxiety [Depression] : depression [Negative] : Heme/Lymph [FreeTextEntry2] : ibs occasionally

## 2023-05-08 NOTE — PHYSICAL EXAM
[General Appearance - Well Developed] : well developed [General Appearance - Well Nourished] : well nourished [Skin Color & Pigmentation] : normal skin color and pigmentation [] : no respiratory distress [Oriented To Time, Place, And Person] : oriented to person, place, and time [No Focal Deficits] : no focal deficits

## 2023-05-08 NOTE — HISTORY OF PRESENT ILLNESS
[Other Location: e.g. School (Enter Location, City,State)___] : at [unfilled], at the time of the visit. [Medical Office: ([facility])___] : at the medical office located in  [Verbal consent obtained from patient] : the patient, [unfilled] [Spouse] : spouse [FreeTextEntry1] : Dear Dr. Greg Guzmán\par          Dr. John Gloria\par \par Thank you so much for the referral to help care for your patient. \par \par Chief Complaint: elevated\par Date of first visit: 05/08 /2023 \par \par DARIUS SORTO is a 72 year old gentlemen w/ PMH HTN, HLD, Alzheimer's dx who presents with elevated PSA. His PSA was 6.99 ng/ml on 4/27/23. His historical PSAs are listed below. Pt has not had prostate mri nor prostate biopsy. Pt has history of retention which required ?possible resection of prostate with Dr. Goldberg (will obtain records), and 1 year ago retention with toscano insertion, episode which resolved when Dr. Gates increased tamsulosin dose. Denies fmhx of  malignancies. \par \par LUTS\par good stream, nocturiax1\par denies straining, dysuria, frequency, urgency, incontinence, dribbling, hesitancy, intermittency\par \par PSA Hx \par 6.99 ng/ml on 04/27/2023\par 3.23 ng/ml on 10/19/2022\par 3.08 ng/ml on 09/21/2021\par 3.71 ng/ml on 09/01/2020\par 1.77 ng/ml on 03/15/2013\par \par MRI Hx \par N/A\par \par Biopsy Hx \par N/A\par \par Prostate cancer screening: The patient and I spoke at length about prostate cancer screening, its risks and its benefits. The patient has 2 risk factors (age, elevated PSA) for prostate cancer. He understands that many men with prostate cancer will die with the disease rather than of it and we also discussed the results large multi-center American and  prostate cancer screening trials. He also understands that PSA in and of itself does not diagnose prostate cancer but only assesses risk to a certain degree. The patient understands that to definitively screen for prostate cancer, a biopsy is required, and this procedure has risks, including bleeding, infection, ED and urinary retention. The patient opted to recheck PSA and proceed with prostate MRI. \par \par The patient denies fevers, chills, nausea and or vomiting and no unexplained weight loss. \par \par All pertinent laboratory, films and physician notes were reviewed. Questionnaire results were discussed with patient.\par

## 2023-05-08 NOTE — ASSESSMENT
[FreeTextEntry1] : DARIUS SORTO is a 72 year old gentlemen w/ PMH HTN, HLD, Alzheimer's dx who presents with elevated PSA. His PSA was 6.99 ng/ml on 4/27/23. His historical PSAs are listed below. Pt has not had prostate mri nor prostate biopsy. Pt has history of retention which required ?possible resection of prostate with Dr. Goldberg (will obtain records), and 1 year ago retention with toscano insertion, episode which resolved when Dr. Gates increased tamsulosin dose. Denies fmhx of  malignancies. \par \par He understands that many men with prostate cancer will die with the disease rather than of it. He also understands that PSA in and of itself does not diagnose prostate cancer but only assesses risk to a certain degree. The patient understands that to definitively screen for prostate cancer, a biopsy is required and this procedure has risks, including bleeding, infection, and urinary retention.  A MRI is ordered prior to possible biopsy, patient is aware to administer a fleet enema prior to MRI. \par \par 1. Schedule MRI - MRI needed for fusion biopsy \par 2. Schedule MRI review appointment with Dr. Estrada\par \par

## 2023-05-10 ENCOUNTER — NON-APPOINTMENT (OUTPATIENT)
Age: 73
End: 2023-05-10

## 2023-05-15 ENCOUNTER — NON-APPOINTMENT (OUTPATIENT)
Age: 73
End: 2023-05-15

## 2023-05-15 LAB
PSA FREE FLD-MCNC: 28 %
PSA FREE SERPL-MCNC: 1.11 NG/ML
PSA SERPL-MCNC: 3.96 NG/ML

## 2023-05-26 ENCOUNTER — RESULT REVIEW (OUTPATIENT)
Age: 73
End: 2023-05-26

## 2023-05-26 ENCOUNTER — APPOINTMENT (OUTPATIENT)
Dept: MRI IMAGING | Facility: CLINIC | Age: 73
End: 2023-05-26
Payer: MEDICARE

## 2023-05-26 ENCOUNTER — OUTPATIENT (OUTPATIENT)
Dept: OUTPATIENT SERVICES | Facility: HOSPITAL | Age: 73
LOS: 1 days | End: 2023-05-26
Payer: MEDICARE

## 2023-05-26 DIAGNOSIS — R97.20 ELEVATED PROSTATE SPECIFIC ANTIGEN [PSA]: ICD-10-CM

## 2023-05-26 PROCEDURE — A9585: CPT

## 2023-05-26 PROCEDURE — 72197 MRI PELVIS W/O & W/DYE: CPT

## 2023-05-26 PROCEDURE — 76498P: CUSTOM | Mod: 26,MH

## 2023-05-26 PROCEDURE — 76498 UNLISTED MR PROCEDURE: CPT

## 2023-05-26 PROCEDURE — 72197 MRI PELVIS W/O & W/DYE: CPT | Mod: 26,MH

## 2023-06-01 ENCOUNTER — APPOINTMENT (OUTPATIENT)
Dept: UROLOGY | Facility: CLINIC | Age: 73
End: 2023-06-01

## 2023-06-05 ENCOUNTER — APPOINTMENT (OUTPATIENT)
Dept: UROLOGY | Facility: CLINIC | Age: 73
End: 2023-06-05
Payer: MEDICARE

## 2023-06-05 LAB
APPEARANCE: CLEAR
BILIRUBIN URINE: NEGATIVE
BLOOD URINE: NEGATIVE
COLOR: YELLOW
GLUCOSE QUALITATIVE U: NEGATIVE MG/DL
KETONES URINE: NEGATIVE MG/DL
LEUKOCYTE ESTERASE URINE: NEGATIVE
NITRITE URINE: NEGATIVE
PH URINE: 6
PROTEIN URINE: NEGATIVE MG/DL
SPECIFIC GRAVITY URINE: 1.02
UROBILINOGEN URINE: 0.2 MG/DL

## 2023-06-05 PROCEDURE — 99214 OFFICE O/P EST MOD 30 MIN: CPT

## 2023-06-06 NOTE — ASSESSMENT
[FreeTextEntry1] : biopsy not recommended at this time based on small size of lesion PIRADS 3 and the relatively low PSA at the moment\par The one elevated PSA has decreased on repeat measurement\par We discussed the pros and cons of biopsy and he wishes to wait\par I personally reviewed the imaging  and questionable lesion on MRI\par recheck PSA in 6 months\par followup in 6 months

## 2023-06-06 NOTE — HISTORY OF PRESENT ILLNESS
[FreeTextEntry1] : a 72 year old gentlemen present with wife\par PMH HTN, HLD, Alzheimer's dx who presents with elevated PSA. His PSA was 6.99 ng/ml on 4/27/23. His historical PSAs are listed below. Pt has history of retention which required ?possible resection of prostate with Dr. Goldberg (will obtain records), and 1 year ago retention with toscano insertion, episode which resolved when Dr. Gates increased tamsulosin dose. Denies fmhx of  malignancies. \par \par PSA Hx \par 3.96 on 5/13/23\par 6.99 ng/ml on 04/27/2023\par 3.23 ng/ml on 10/19/2022\par 3.08 ng/ml on 09/21/2021\par 3.71 ng/ml on 09/01/2020\par 1.77 ng/ml on 03/15/2013\par \par MRI\par Pirads 3 lesionx1 4mm\par 34cc\par d=0.12\par \par good urinary symptoms\par taking Tamsulosin at night\par

## 2023-06-06 NOTE — PHYSICAL EXAM
[General Appearance - Well Developed] : well developed [General Appearance - Well Nourished] : well nourished [Normal Appearance] : normal appearance [Edema] : no peripheral edema [Skin Color & Pigmentation] : normal skin color and pigmentation [] : no rash [No Focal Deficits] : no focal deficits [Oriented To Time, Place, And Person] : oriented to person, place, and time [Affect] : the affect was normal [Not Anxious] : not anxious [Well Groomed] : well groomed [General Appearance - In No Acute Distress] : no acute distress [Respiration, Rhythm And Depth] : normal respiratory rhythm and effort [Exaggerated Use Of Accessory Muscles For Inspiration] : no accessory muscle use [Abdomen Soft] : soft [Abdomen Tenderness] : non-tender [Costovertebral Angle Tenderness] : no ~M costovertebral angle tenderness [Urethral Meatus] : meatus normal [Urinary Bladder Findings] : the bladder was normal on palpation [Scrotum] : the scrotum was normal [Testes Mass (___cm)] : there were no testicular masses [Normal Station and Gait] : the gait and station were normal for the patient's age [Mood] : the mood was normal [No Palpable Adenopathy] : no palpable adenopathy

## 2023-06-16 ENCOUNTER — APPOINTMENT (OUTPATIENT)
Dept: INTERNAL MEDICINE | Facility: CLINIC | Age: 73
End: 2023-06-16
Payer: MEDICARE

## 2023-06-16 VITALS
SYSTOLIC BLOOD PRESSURE: 120 MMHG | BODY MASS INDEX: 27.69 KG/M2 | RESPIRATION RATE: 12 BRPM | HEART RATE: 72 BPM | DIASTOLIC BLOOD PRESSURE: 80 MMHG | WEIGHT: 193 LBS

## 2023-06-16 DIAGNOSIS — Z23 ENCOUNTER FOR IMMUNIZATION: ICD-10-CM

## 2023-06-16 PROCEDURE — 90677 PCV20 VACCINE IM: CPT

## 2023-06-16 PROCEDURE — 99214 OFFICE O/P EST MOD 30 MIN: CPT | Mod: 25

## 2023-06-16 PROCEDURE — G0009: CPT

## 2023-06-16 PROCEDURE — 36415 COLL VENOUS BLD VENIPUNCTURE: CPT

## 2023-06-16 RX ORDER — ENEMA 19; 7 G/133ML; G/133ML
7-19 ENEMA RECTAL
Qty: 2 | Refills: 0 | Status: DISCONTINUED | COMMUNITY
Start: 2023-05-08 | End: 2023-06-16

## 2023-06-16 NOTE — HISTORY OF PRESENT ILLNESS
[FreeTextEntry1] : Patient presents for followup on hypertension/hyperlipidemia/med check. Patient is currently fasting/no new issues.Patient is currently on lisinopril for hypertension and on zetia for hyperlipidemia.\par -feels well\par -+got covid vaccines X5\par -on Zoloft for anxiety, had urinary retention with BuSpar\par - Following with neurology for dementia\par -PSA elevated,  had pt do prostate MRI, recommendations are to monitor PSA, last reading was better\par

## 2023-06-16 NOTE — ASSESSMENT
[FreeTextEntry1] : Prevnar 20 vaccine given without reaction.  Venipuncture done in our office, Labs sent out/ further recommendations will be made based on lab results. Patient advised to continue present medications with diet/exercise and specialist followup. RTO in oct for CP\par \par discussed shingrix vaccine, +got covid vaccines X5\par colonoscopy was 8/16 and was normal-followup in 5years-Dr. Stevnes=declines so FIT test given "to do", given\par specialists include\par 1. cardiology prn-Dr. Ruano\par 2. urology-Dr. Gloria/Dr. Munoz\par 3.dermatology q. year-Dr. Agustin\par 4.ophthalmology prn-Dr. Mcclelland\par 5.Neuro--Dr. Griffith\par 6.Ortho-Dr. Tang\par 7.Geriatrics NP-Dawna\par 24-hour urine done 9/2019=monitor serum creatinine\par Hep C screening in past was negative\par Cath 7/2018\par echo 2/2022\par PSA via \par \par \par \par \par

## 2023-06-17 LAB
ALBUMIN SERPL ELPH-MCNC: 4.4 G/DL
ALP BLD-CCNC: 66 U/L
ALT SERPL-CCNC: 21 U/L
ANION GAP SERPL CALC-SCNC: 13 MMOL/L
AST SERPL-CCNC: 17 U/L
BILIRUB SERPL-MCNC: 0.7 MG/DL
BUN SERPL-MCNC: 27 MG/DL
CALCIUM SERPL-MCNC: 9.6 MG/DL
CHLORIDE SERPL-SCNC: 107 MMOL/L
CHOLEST SERPL-MCNC: 218 MG/DL
CO2 SERPL-SCNC: 20 MMOL/L
CREAT SERPL-MCNC: 1.49 MG/DL
EGFR: 50 ML/MIN/1.73M2
GLUCOSE SERPL-MCNC: 91 MG/DL
HDLC SERPL-MCNC: 75 MG/DL
LDLC SERPL CALC-MCNC: 122 MG/DL
NONHDLC SERPL-MCNC: 143 MG/DL
POTASSIUM SERPL-SCNC: 4.8 MMOL/L
PROT SERPL-MCNC: 6.5 G/DL
SODIUM SERPL-SCNC: 140 MMOL/L
TRIGL SERPL-MCNC: 104 MG/DL

## 2023-06-19 ENCOUNTER — TRANSCRIPTION ENCOUNTER (OUTPATIENT)
Age: 73
End: 2023-06-19

## 2023-06-19 ENCOUNTER — NON-APPOINTMENT (OUTPATIENT)
Age: 73
End: 2023-06-19

## 2023-07-02 ENCOUNTER — TRANSCRIPTION ENCOUNTER (OUTPATIENT)
Age: 73
End: 2023-07-02

## 2023-07-03 ENCOUNTER — OUTPATIENT (OUTPATIENT)
Dept: OUTPATIENT SERVICES | Facility: HOSPITAL | Age: 73
LOS: 1 days | End: 2023-07-03
Payer: MEDICARE

## 2023-07-03 ENCOUNTER — APPOINTMENT (OUTPATIENT)
Dept: ULTRASOUND IMAGING | Facility: CLINIC | Age: 73
End: 2023-07-03
Payer: MEDICARE

## 2023-07-03 DIAGNOSIS — R60.0 LOCALIZED EDEMA: ICD-10-CM

## 2023-07-03 PROCEDURE — 93970 EXTREMITY STUDY: CPT | Mod: 26

## 2023-07-03 PROCEDURE — 93970 EXTREMITY STUDY: CPT

## 2023-07-05 ENCOUNTER — APPOINTMENT (OUTPATIENT)
Dept: INTERNAL MEDICINE | Facility: CLINIC | Age: 73
End: 2023-07-05
Payer: MEDICARE

## 2023-07-05 ENCOUNTER — NON-APPOINTMENT (OUTPATIENT)
Age: 73
End: 2023-07-05

## 2023-07-05 VITALS
HEIGHT: 70 IN | DIASTOLIC BLOOD PRESSURE: 62 MMHG | OXYGEN SATURATION: 98 % | BODY MASS INDEX: 28.49 KG/M2 | RESPIRATION RATE: 14 BRPM | WEIGHT: 199 LBS | HEART RATE: 72 BPM | SYSTOLIC BLOOD PRESSURE: 120 MMHG

## 2023-07-05 PROCEDURE — 93000 ELECTROCARDIOGRAM COMPLETE: CPT

## 2023-07-05 PROCEDURE — 36415 COLL VENOUS BLD VENIPUNCTURE: CPT

## 2023-07-05 PROCEDURE — 99214 OFFICE O/P EST MOD 30 MIN: CPT | Mod: 25

## 2023-07-07 LAB
ALBUMIN SERPL ELPH-MCNC: 4.6 G/DL
ALP BLD-CCNC: 73 U/L
ALT SERPL-CCNC: 25 U/L
ANION GAP SERPL CALC-SCNC: 13 MMOL/L
AST SERPL-CCNC: 22 U/L
BILIRUB SERPL-MCNC: 0.4 MG/DL
BUN SERPL-MCNC: 37 MG/DL
CALCIUM SERPL-MCNC: 9.6 MG/DL
CHLORIDE SERPL-SCNC: 107 MMOL/L
CO2 SERPL-SCNC: 20 MMOL/L
CREAT SERPL-MCNC: 1.57 MG/DL
EGFR: 47 ML/MIN/1.73M2
GLUCOSE SERPL-MCNC: 79 MG/DL
NT-PROBNP SERPL-MCNC: 80 PG/ML
POTASSIUM SERPL-SCNC: 5.5 MMOL/L
PROT SERPL-MCNC: 6.8 G/DL
SODIUM SERPL-SCNC: 140 MMOL/L
T4 FREE SERPL-MCNC: 0.9 NG/DL
TSH SERPL-ACNC: 2.16 UIU/ML

## 2023-07-07 NOTE — HISTORY OF PRESENT ILLNESS
"Chief Complaint   Patient presents with     Pre-Op Exam     07/19/2017, eyelid surgery     /78  Pulse 92  Temp 98.9  F (37.2  C) (Oral)  Ht 5' 3.5\" (1.613 m)  Wt 211 lb 9.6 oz (96 kg)  LMP 08/28/2015  SpO2 94%  BMI 36.9 kg/m2 Estimated body mass index is 36.9 kg/(m^2) as calculated from the following:    Height as of this encounter: 5' 3.5\" (1.613 m).    Weight as of this encounter: 211 lb 9.6 oz (96 kg).  Medication Reconciliation: complete      Health Maintenance due pending provider review:  Pap Smear    Will sched thao Malone CMA  " [FreeTextEntry8] : The patient presents for an acute visit secondary to bilateral leg swelling for at least 1-1/2 weeks.\par The patient's sister-in-law had called when I was on call 2 days ago secondary to edema with recent airplane trip with duplex done showing no DVT of either leg.  No redness or calf pain.\par On more direct questioning the patient had swelling at least a few days prior to this plane flight therefore likely unrelated to recent travel.\par It is also noted the patient has gained a significant amount of weight over the past year.  Of 30-40 pounds.\par Patient is a poor historian with dementia.\par Denies chest pain, palpitations, shortness of breath or dizziness.\par Overall patient without any complaints.

## 2023-07-07 NOTE — REVIEW OF SYSTEMS
[Lower Ext Edema] : lower extremity edema [Negative] : Heme/Lymph [Chest Pain] : no chest pain [Palpitations] : no palpitations [Claudication] : no  leg claudication [Orthopena] : no orthopnea [Paroxysmal Nocturnal Dyspnea] : no paroxysmal nocturnal dyspnea

## 2023-07-07 NOTE — PHYSICAL EXAM
[No Acute Distress] : no acute distress [No JVD] : no jugular venous distention [No Respiratory Distress] : no respiratory distress  [No Accessory Muscle Use] : no accessory muscle use [Clear to Auscultation] : lungs were clear to auscultation bilaterally [Normal Rate] : normal rate  [Regular Rhythm] : with a regular rhythm [Soft] : abdomen soft [Non Tender] : non-tender [Non-distended] : non-distended [No Masses] : no abdominal mass palpated [No HSM] : no HSM [No Focal Deficits] : no focal deficits [Normal Affect] : the affect was normal [de-identified] : 2+ bilateral pitting edema right slightly more than the left without Homans' sign or erythema

## 2023-07-07 NOTE — ASSESSMENT
[FreeTextEntry1] : 72-year-old male with Alzheimer's dementia, CKD III, hypertension with 2+ bilateral pitting edema with significant weight gain.  No clinical signs of CHF but must be ruled out therefore BNP will be included in today's blood work.\par Metabolic possibilities include kidney, liver and thyroid dysfunction therefore blood work done today.\par Bilateral duplex 7/3 negative for DVT\par Further plans pending results of these tests.

## 2023-07-07 NOTE — ADDENDUM
[FreeTextEntry1] : CMP good other than potassium at 5.5 but hemolyzed and creatinine slightly higher at 1.57.\par Thyroid blood work normal.\par CBC normal.\par \par Etiology of edema highly likely noncardiac but may be renal therefore nephrology consultation.\par Increase water daily.\par Refer for renal ultrasound and duplex of renal arteries to rule out renal artery stenosis.\par Nephrology evaluation

## 2023-07-10 ENCOUNTER — APPOINTMENT (OUTPATIENT)
Dept: CT IMAGING | Facility: CLINIC | Age: 73
End: 2023-07-10
Payer: MEDICARE

## 2023-07-10 ENCOUNTER — OUTPATIENT (OUTPATIENT)
Dept: OUTPATIENT SERVICES | Facility: HOSPITAL | Age: 73
LOS: 1 days | End: 2023-07-10
Payer: MEDICARE

## 2023-07-10 ENCOUNTER — APPOINTMENT (OUTPATIENT)
Dept: ULTRASOUND IMAGING | Facility: CLINIC | Age: 73
End: 2023-07-10
Payer: MEDICARE

## 2023-07-10 DIAGNOSIS — G30.1 ALZHEIMER'S DISEASE WITH LATE ONSET: ICD-10-CM

## 2023-07-10 DIAGNOSIS — N18.31 CHRONIC KIDNEY DISEASE, STAGE 3A: ICD-10-CM

## 2023-07-10 PROCEDURE — 76775 US EXAM ABDO BACK WALL LIM: CPT | Mod: 26

## 2023-07-10 PROCEDURE — 70450 CT HEAD/BRAIN W/O DYE: CPT | Mod: 26,MH

## 2023-07-10 PROCEDURE — 70450 CT HEAD/BRAIN W/O DYE: CPT

## 2023-07-10 PROCEDURE — 76775 US EXAM ABDO BACK WALL LIM: CPT

## 2023-07-11 ENCOUNTER — APPOINTMENT (OUTPATIENT)
Dept: GERIATRICS | Facility: CLINIC | Age: 73
End: 2023-07-11
Payer: MEDICARE

## 2023-07-11 PROCEDURE — 99443: CPT | Mod: 95

## 2023-07-13 ENCOUNTER — OUTPATIENT (OUTPATIENT)
Dept: OUTPATIENT SERVICES | Facility: HOSPITAL | Age: 73
LOS: 1 days | End: 2023-07-13

## 2023-07-13 ENCOUNTER — APPOINTMENT (OUTPATIENT)
Dept: ULTRASOUND IMAGING | Facility: CLINIC | Age: 73
End: 2023-07-13
Payer: MEDICARE

## 2023-07-13 DIAGNOSIS — N18.31 CHRONIC KIDNEY DISEASE, STAGE 3A: ICD-10-CM

## 2023-07-13 PROCEDURE — 93975 VASCULAR STUDY: CPT | Mod: 26

## 2023-07-17 ENCOUNTER — NON-APPOINTMENT (OUTPATIENT)
Age: 73
End: 2023-07-17

## 2023-07-17 ENCOUNTER — RX RENEWAL (OUTPATIENT)
Age: 73
End: 2023-07-17

## 2023-07-21 ENCOUNTER — APPOINTMENT (OUTPATIENT)
Dept: VASCULAR SURGERY | Facility: CLINIC | Age: 73
End: 2023-07-21
Payer: MEDICARE

## 2023-07-21 VITALS
HEIGHT: 70 IN | TEMPERATURE: 97.2 F | RESPIRATION RATE: 14 BRPM | HEART RATE: 69 BPM | SYSTOLIC BLOOD PRESSURE: 107 MMHG | WEIGHT: 200 LBS | DIASTOLIC BLOOD PRESSURE: 67 MMHG | BODY MASS INDEX: 28.63 KG/M2 | OXYGEN SATURATION: 97 %

## 2023-07-21 DIAGNOSIS — I89.0 LYMPHEDEMA, NOT ELSEWHERE CLASSIFIED: ICD-10-CM

## 2023-07-21 PROCEDURE — 99203 OFFICE O/P NEW LOW 30 MIN: CPT

## 2023-07-21 NOTE — PHYSICAL EXAM
[Respiratory Effort] : normal respiratory effort [2+] : left 2+ [de-identified] : Appears well in no acute distress [de-identified] : Bilateral lower extremities with edema involving the toes dorsum of the foot ankle and calf.  Edema is pitting bilateral and equal.  Some element of hyperkeratosis/lipodermatosclerosis/chronic skin staining.

## 2023-07-21 NOTE — HISTORY OF PRESENT ILLNESS
[FreeTextEntry1] : Pleasant 72-year-old gentleman accompanied by his wife.  Presents with progressive mainly foot and ankle swelling but slightly involving the calf as well.  This is bilateral.  He denies any pain.  It began a few weeks ago just prior to a vacation.  He has worn compression stockings in the past.  He denies shortness of breath.  He does not have a history of congestive heart failure.  He underwent a venous duplex to rule out DVT on 7/3/2023.  I reviewed the images which shows normal phasicity within the vessels without any evidence of acute DVT.  Of note he also underwent a renal ultrasound which shows a mild mid right renal artery stenosis.  No history of flash pulmonary edema, total body edema, use of multiple blood pressure medications.  Previously he was exercising multiple days a week but is coinciding with his edema he had decreased his activity has become more sedentary.  He also sleeps with his feet dangling off the bed but he has been doing so for many years.  He is scheduled to see cardiology today as well.

## 2023-07-21 NOTE — ASSESSMENT
[FreeTextEntry1] : 72-year-old male with what appears to be progressive lymphedema.  Believe it is multifactorial but a big reason is decreased exercise and increased sedentary lifestyle.  I do not believe he has any evidence suggesting congestive heart failure but this will be worked up by cardiology.  He was prescribed a new prescription for compression stockings..  Being that he is attempted exercise, elevation, standard 20 to 30 mm compression stocking use and despite this still continues to have edema of both lower extremities I do believe he would benefit from a adjunct therapy with a lymphedema pump.

## 2023-07-27 ENCOUNTER — NON-APPOINTMENT (OUTPATIENT)
Age: 73
End: 2023-07-27

## 2023-08-01 ENCOUNTER — APPOINTMENT (OUTPATIENT)
Dept: NEPHROLOGY | Facility: CLINIC | Age: 73
End: 2023-08-01

## 2023-08-17 ENCOUNTER — APPOINTMENT (OUTPATIENT)
Dept: GERIATRICS | Facility: CLINIC | Age: 73
End: 2023-08-17
Payer: MEDICARE

## 2023-08-17 VITALS
WEIGHT: 204.13 LBS | HEIGHT: 70 IN | BODY MASS INDEX: 29.22 KG/M2 | DIASTOLIC BLOOD PRESSURE: 75 MMHG | HEART RATE: 74 BPM | SYSTOLIC BLOOD PRESSURE: 132 MMHG | OXYGEN SATURATION: 99 % | RESPIRATION RATE: 15 BRPM | TEMPERATURE: 97.3 F

## 2023-08-17 DIAGNOSIS — R60.0 LOCALIZED EDEMA: ICD-10-CM

## 2023-08-17 DIAGNOSIS — K59.00 CONSTIPATION, UNSPECIFIED: ICD-10-CM

## 2023-08-17 PROCEDURE — 99497 ADVNCD CARE PLAN 30 MIN: CPT

## 2023-08-17 PROCEDURE — 99204 OFFICE O/P NEW MOD 45 MIN: CPT

## 2023-08-18 PROBLEM — R60.0 EDEMA OF BOTH LOWER LEGS: Status: ACTIVE | Noted: 2023-07-05

## 2023-08-18 PROBLEM — K59.00 CONSTIPATION: Status: ACTIVE | Noted: 2022-08-15

## 2023-08-18 NOTE — HISTORY OF PRESENT ILLNESS
[No falls in past year] : Patient reported no falls in the past year [FreeTextEntry1] : 71 yo male here for consultation re comprehensive assessemtent.Pt with Alzheimers disease with cognitive impairement. Pt dependent on wife. Pt former  of [employer] for [employer]. 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 Wife is primary caregiver. Pt with recent increase in care at home 2 hours a day.  Pt denies pain or issues. Wife explains recent evaluation with ADC program and recent trip with family to [facility]. His travel course was complicated by leg edema, decreased walking and some distress/agitation. Pt's sister in law and dtr very concerned about future travel and limited care support pt has at home. They have expressed concerns to patient's wife their concerns for her.  Wife aware of these concerns. Wife hears their concerns, but does not agree with them at this time.  Pt and wife going ot [facility] in Fall on a cruise with other family members.  No falls.  [Independent] : transferring/mobility [] : Assistance needed with toileting. [Completely Dependent] : Completely dependent. [FreeTextEntry8] : Has had some accidents recently but feel it was stress related [0] : 1) Little interest or pleasure doing things: Not at all (0) [PHQ-2 Negative - No further assessment needed] : PHQ-2 Negative - No further assessment needed [ATC5[place]] : 0

## 2023-08-18 NOTE — PHYSICAL EXAM
[Alert] : alert [No Acute Distress] : in no acute distress [Normal Outer Ear/Nose] : the ears and nose were normal in appearance [Normal Appearance] : the appearance of the neck was normal [Supple] : the neck was supple [No Respiratory Distress] : no respiratory distress [No Acc Muscle Use] : no accessory muscle use [Respiration, Rhythm And Depth] : normal respiratory rhythm and effort [Auscultation Breath Sounds / Voice Sounds] : lungs were clear to auscultation bilaterally [Heart Rate And Rhythm] : heart rate was normal and rhythm regular [Abdomen Tenderness] : non-tender [Bowel Sounds] : normal bowel sounds [Abdomen Soft] : soft [No Spinal Tenderness] : no spinal tenderness [Normal Color / Pigmentation] : normal skin color and pigmentation [Normal Turgor] : normal skin turgor [No Focal Deficits] : no focal deficits [Normal Affect] : the affect was normal [Normal Mood] : the mood was normal [MentalStatusTotal] : did not repeat on this visit

## 2023-08-18 NOTE — REVIEW OF SYSTEMS
[As Noted in HPI] : as noted in HPI [Anxiety] : anxiety [Negative] : Heme/Lymph [FreeTextEntry9] : bilateral edema [de-identified] : some agitation

## 2023-08-21 ENCOUNTER — APPOINTMENT (OUTPATIENT)
Dept: GERIATRICS | Facility: CLINIC | Age: 73
End: 2023-08-21
Payer: MEDICARE

## 2023-08-21 PROCEDURE — 99443: CPT | Mod: 95

## 2023-08-27 ENCOUNTER — NON-APPOINTMENT (OUTPATIENT)
Age: 73
End: 2023-08-27

## 2023-08-28 ENCOUNTER — RX RENEWAL (OUTPATIENT)
Age: 73
End: 2023-08-28

## 2023-08-28 ENCOUNTER — NON-APPOINTMENT (OUTPATIENT)
Age: 73
End: 2023-08-28

## 2023-08-29 ENCOUNTER — NON-APPOINTMENT (OUTPATIENT)
Age: 73
End: 2023-08-29

## 2023-09-05 ENCOUNTER — RX RENEWAL (OUTPATIENT)
Age: 73
End: 2023-09-05

## 2023-09-19 ENCOUNTER — APPOINTMENT (OUTPATIENT)
Dept: GERIATRICS | Facility: CLINIC | Age: 73
End: 2023-09-19
Payer: COMMERCIAL

## 2023-09-19 ENCOUNTER — RX RENEWAL (OUTPATIENT)
Age: 73
End: 2023-09-19

## 2023-09-19 DIAGNOSIS — F41.9 ANXIETY DISORDER, UNSPECIFIED: ICD-10-CM

## 2023-09-19 PROCEDURE — 99443: CPT | Mod: 95

## 2023-09-20 ENCOUNTER — NON-APPOINTMENT (OUTPATIENT)
Age: 73
End: 2023-09-20

## 2023-09-25 ENCOUNTER — RX RENEWAL (OUTPATIENT)
Age: 73
End: 2023-09-25

## 2023-09-25 DIAGNOSIS — Z92.29 PERSONAL HISTORY OF OTHER DRUG THERAPY: ICD-10-CM

## 2023-09-27 ENCOUNTER — NON-APPOINTMENT (OUTPATIENT)
Age: 73
End: 2023-09-27

## 2023-09-28 ENCOUNTER — LABORATORY RESULT (OUTPATIENT)
Age: 73
End: 2023-09-28

## 2023-09-29 ENCOUNTER — LABORATORY RESULT (OUTPATIENT)
Age: 73
End: 2023-09-29

## 2023-10-01 ENCOUNTER — NON-APPOINTMENT (OUTPATIENT)
Age: 73
End: 2023-10-01

## 2023-10-06 NOTE — H&P ADULT - NSHPLANGLIMITEDENGLISH_GEN_A_CORE
Call received from the vascular lab that patient tested positive for LLE DVT x 2. PCP is out of office so MD, Dr. Misty Sanchez was notified by this writer. After he reviewed the result the patients Granddaughter was put on the phone as she had accompanied the patient for testing. Dr. Misty Sanchez spoke with patient/granddaughter and the risks of bleeding were explained by him, as well as risk 'v' benefits of treatment. The patient chose to be on Eliquis. Was instructed to stop her ASA now. S/S of bleeding were discussed and symptoms that would necessitate patient going to the ED were explained. Questions answered and understanding stated. Samples of Eliquis with written instructions were provided to the granddaughter. Follow up appointment scheduled with PCP for 10/12 @ 10:00 AM.  PCP Marilee Godoy was notified. No

## 2023-10-23 ENCOUNTER — APPOINTMENT (OUTPATIENT)
Dept: GERIATRICS | Facility: CLINIC | Age: 73
End: 2023-10-23

## 2023-11-06 ENCOUNTER — APPOINTMENT (OUTPATIENT)
Dept: NEUROLOGY | Facility: CLINIC | Age: 73
End: 2023-11-06
Payer: MEDICARE

## 2023-11-06 VITALS
SYSTOLIC BLOOD PRESSURE: 130 MMHG | HEART RATE: 74 BPM | DIASTOLIC BLOOD PRESSURE: 81 MMHG | BODY MASS INDEX: 29.2 KG/M2 | WEIGHT: 204 LBS | HEIGHT: 70 IN

## 2023-11-06 PROCEDURE — 99214 OFFICE O/P EST MOD 30 MIN: CPT

## 2023-11-08 ENCOUNTER — RX RENEWAL (OUTPATIENT)
Age: 73
End: 2023-11-08

## 2023-11-13 ENCOUNTER — APPOINTMENT (OUTPATIENT)
Dept: INTERNAL MEDICINE | Facility: CLINIC | Age: 73
End: 2023-11-13
Payer: MEDICARE

## 2023-11-13 VITALS
OXYGEN SATURATION: 98 % | BODY MASS INDEX: 28.63 KG/M2 | DIASTOLIC BLOOD PRESSURE: 58 MMHG | WEIGHT: 200 LBS | HEART RATE: 72 BPM | SYSTOLIC BLOOD PRESSURE: 118 MMHG | RESPIRATION RATE: 14 BRPM | HEIGHT: 70 IN

## 2023-11-13 DIAGNOSIS — I10 ESSENTIAL (PRIMARY) HYPERTENSION: ICD-10-CM

## 2023-11-13 DIAGNOSIS — Z12.5 ENCOUNTER FOR SCREENING FOR MALIGNANT NEOPLASM OF PROSTATE: ICD-10-CM

## 2023-11-13 DIAGNOSIS — Z79.899 OTHER LONG TERM (CURRENT) DRUG THERAPY: ICD-10-CM

## 2023-11-13 DIAGNOSIS — F02.80 ALZHEIMER'S DISEASE WITH LATE ONSET: ICD-10-CM

## 2023-11-13 DIAGNOSIS — G30.1 ALZHEIMER'S DISEASE WITH LATE ONSET: ICD-10-CM

## 2023-11-13 DIAGNOSIS — E78.5 HYPERLIPIDEMIA, UNSPECIFIED: ICD-10-CM

## 2023-11-13 DIAGNOSIS — R63.5 ABNORMAL WEIGHT GAIN: ICD-10-CM

## 2023-11-13 DIAGNOSIS — K21.9 GASTRO-ESOPHAGEAL REFLUX DISEASE W/OUT ESOPHAGITIS: ICD-10-CM

## 2023-11-13 DIAGNOSIS — Z00.00 ENCOUNTER FOR GENERAL ADULT MEDICAL EXAMINATION W/OUT ABNORMAL FINDINGS: ICD-10-CM

## 2023-11-13 PROCEDURE — 90662 IIV NO PRSV INCREASED AG IM: CPT

## 2023-11-13 PROCEDURE — G0008: CPT

## 2023-11-13 PROCEDURE — G0439: CPT

## 2023-11-13 PROCEDURE — 36415 COLL VENOUS BLD VENIPUNCTURE: CPT

## 2023-11-13 RX ORDER — TORSEMIDE 10 MG/1
10 TABLET ORAL
Qty: 10 | Refills: 0 | Status: DISCONTINUED | COMMUNITY
Start: 2023-07-21

## 2023-11-14 LAB
ALBUMIN SERPL ELPH-MCNC: 4.4 G/DL
ALP BLD-CCNC: 75 U/L
ALT SERPL-CCNC: 24 U/L
ANION GAP SERPL CALC-SCNC: 12 MMOL/L
AST SERPL-CCNC: 15 U/L
BASOPHILS # BLD AUTO: 0.06 K/UL
BASOPHILS NFR BLD AUTO: 1 %
BILIRUB SERPL-MCNC: 0.3 MG/DL
BUN SERPL-MCNC: 44 MG/DL
CALCIUM SERPL-MCNC: 9.2 MG/DL
CHLORIDE SERPL-SCNC: 110 MMOL/L
CHOLEST SERPL-MCNC: 238 MG/DL
CO2 SERPL-SCNC: 21 MMOL/L
CREAT SERPL-MCNC: 2.06 MG/DL
EGFR: 33 ML/MIN/1.73M2
EOSINOPHIL # BLD AUTO: 0.22 K/UL
EOSINOPHIL NFR BLD AUTO: 3.5 %
GLUCOSE SERPL-MCNC: 93 MG/DL
HCT VFR BLD CALC: 40.9 %
HDLC SERPL-MCNC: 75 MG/DL
HGB BLD-MCNC: 13.2 G/DL
IMM GRANULOCYTES NFR BLD AUTO: 0.3 %
LDLC SERPL CALC-MCNC: 150 MG/DL
LYMPHOCYTES # BLD AUTO: 1.1 K/UL
LYMPHOCYTES NFR BLD AUTO: 17.7 %
MAN DIFF?: NORMAL
MCHC RBC-ENTMCNC: 32.3 GM/DL
MCHC RBC-ENTMCNC: 32.6 PG
MCV RBC AUTO: 101 FL
MONOCYTES # BLD AUTO: 0.53 K/UL
MONOCYTES NFR BLD AUTO: 8.5 %
NEUTROPHILS # BLD AUTO: 4.27 K/UL
NEUTROPHILS NFR BLD AUTO: 69 %
NONHDLC SERPL-MCNC: 164 MG/DL
PLATELET # BLD AUTO: 320 K/UL
POTASSIUM SERPL-SCNC: 5.6 MMOL/L
PROT SERPL-MCNC: 6.8 G/DL
PSA SERPL-MCNC: 5.33 NG/ML
RBC # BLD: 4.05 M/UL
RBC # FLD: 12.8 %
SODIUM SERPL-SCNC: 142 MMOL/L
TRIGL SERPL-MCNC: 84 MG/DL
TSH SERPL-ACNC: 2.16 UIU/ML
WBC # FLD AUTO: 6.2 K/UL

## 2023-11-24 ENCOUNTER — APPOINTMENT (OUTPATIENT)
Dept: GERIATRICS | Facility: CLINIC | Age: 73
End: 2023-11-24
Payer: COMMERCIAL

## 2023-11-24 PROCEDURE — 99443: CPT

## 2023-11-24 RX ORDER — METHYLCELLULOSE 500 MG/1
500 TABLET ORAL
Qty: 30 | Refills: 0 | Status: DISCONTINUED | COMMUNITY
Start: 2022-11-16 | End: 2023-10-04

## 2023-11-27 ENCOUNTER — NON-APPOINTMENT (OUTPATIENT)
Age: 73
End: 2023-11-27

## 2023-11-27 LAB
ANION GAP SERPL CALC-SCNC: 12 MMOL/L
BUN SERPL-MCNC: 33 MG/DL
CALCIUM SERPL-MCNC: 9.2 MG/DL
CHLORIDE SERPL-SCNC: 106 MMOL/L
CO2 SERPL-SCNC: 22 MMOL/L
CREAT SERPL-MCNC: 2.12 MG/DL
EGFR: 32 ML/MIN/1.73M2
GLUCOSE SERPL-MCNC: 112 MG/DL
POTASSIUM SERPL-SCNC: 4.7 MMOL/L
SODIUM SERPL-SCNC: 140 MMOL/L

## 2023-11-28 ENCOUNTER — APPOINTMENT (OUTPATIENT)
Dept: INTERNAL MEDICINE | Facility: CLINIC | Age: 73
End: 2023-11-28
Payer: MEDICARE

## 2023-11-28 VITALS
WEIGHT: 200 LBS | HEART RATE: 71 BPM | OXYGEN SATURATION: 97 % | TEMPERATURE: 97.2 F | BODY MASS INDEX: 28.63 KG/M2 | DIASTOLIC BLOOD PRESSURE: 75 MMHG | SYSTOLIC BLOOD PRESSURE: 120 MMHG | RESPIRATION RATE: 13 BRPM | HEIGHT: 70 IN

## 2023-11-28 DIAGNOSIS — J06.9 ACUTE UPPER RESPIRATORY INFECTION, UNSPECIFIED: ICD-10-CM

## 2023-11-28 DIAGNOSIS — Z11.59 ENCOUNTER FOR SCREENING FOR OTHER VIRAL DISEASES: ICD-10-CM

## 2023-11-28 PROCEDURE — 99213 OFFICE O/P EST LOW 20 MIN: CPT

## 2023-11-29 LAB
RAPID RVP RESULT: DETECTED
RV+EV RNA SPEC QL NAA+PROBE: DETECTED
SARS-COV-2 RNA PNL RESP NAA+PROBE: NOT DETECTED

## 2023-11-29 RX ORDER — AZITHROMYCIN 250 MG/1
250 TABLET, FILM COATED ORAL
Qty: 1 | Refills: 0 | Status: DISCONTINUED | COMMUNITY
Start: 2023-11-28 | End: 2023-11-29

## 2023-11-30 ENCOUNTER — INPATIENT (INPATIENT)
Facility: HOSPITAL | Age: 73
LOS: 4 days | Discharge: HOME | DRG: 683 | End: 2023-12-05
Attending: HOSPITALIST | Admitting: STUDENT IN AN ORGANIZED HEALTH CARE EDUCATION/TRAINING PROGRAM
Payer: MEDICARE

## 2023-11-30 ENCOUNTER — RX RENEWAL (OUTPATIENT)
Age: 73
End: 2023-11-30

## 2023-11-30 ENCOUNTER — APPOINTMENT (OUTPATIENT)
Dept: NEPHROLOGY | Facility: CLINIC | Age: 73
End: 2023-11-30

## 2023-11-30 VITALS
RESPIRATION RATE: 16 BRPM | HEART RATE: 107 BPM | OXYGEN SATURATION: 98 % | DIASTOLIC BLOOD PRESSURE: 69 MMHG | SYSTOLIC BLOOD PRESSURE: 105 MMHG | WEIGHT: 198.42 LBS | TEMPERATURE: 98 F

## 2023-11-30 DIAGNOSIS — N17.9 ACUTE KIDNEY FAILURE, UNSPECIFIED: ICD-10-CM

## 2023-11-30 LAB
ALBUMIN SERPL ELPH-MCNC: 3.9 G/DL — SIGNIFICANT CHANGE UP (ref 3.3–5.2)
ALBUMIN SERPL ELPH-MCNC: 3.9 G/DL — SIGNIFICANT CHANGE UP (ref 3.3–5.2)
ALP SERPL-CCNC: 77 U/L — SIGNIFICANT CHANGE UP (ref 40–120)
ALP SERPL-CCNC: 77 U/L — SIGNIFICANT CHANGE UP (ref 40–120)
ALT FLD-CCNC: 24 U/L — SIGNIFICANT CHANGE UP
ALT FLD-CCNC: 24 U/L — SIGNIFICANT CHANGE UP
ANION GAP SERPL CALC-SCNC: 12 MMOL/L — SIGNIFICANT CHANGE UP (ref 5–17)
ANION GAP SERPL CALC-SCNC: 12 MMOL/L — SIGNIFICANT CHANGE UP (ref 5–17)
APPEARANCE UR: CLEAR — SIGNIFICANT CHANGE UP
APPEARANCE UR: CLEAR — SIGNIFICANT CHANGE UP
APTT BLD: 30.5 SEC — SIGNIFICANT CHANGE UP (ref 24.5–35.6)
APTT BLD: 30.5 SEC — SIGNIFICANT CHANGE UP (ref 24.5–35.6)
AST SERPL-CCNC: 36 U/L — SIGNIFICANT CHANGE UP
AST SERPL-CCNC: 36 U/L — SIGNIFICANT CHANGE UP
BASOPHILS # BLD AUTO: 0.08 K/UL — SIGNIFICANT CHANGE UP (ref 0–0.2)
BASOPHILS # BLD AUTO: 0.08 K/UL — SIGNIFICANT CHANGE UP (ref 0–0.2)
BASOPHILS NFR BLD AUTO: 0.7 % — SIGNIFICANT CHANGE UP (ref 0–2)
BASOPHILS NFR BLD AUTO: 0.7 % — SIGNIFICANT CHANGE UP (ref 0–2)
BILIRUB SERPL-MCNC: 0.4 MG/DL — SIGNIFICANT CHANGE UP (ref 0.4–2)
BILIRUB SERPL-MCNC: 0.4 MG/DL — SIGNIFICANT CHANGE UP (ref 0.4–2)
BILIRUB UR-MCNC: NEGATIVE — SIGNIFICANT CHANGE UP
BILIRUB UR-MCNC: NEGATIVE — SIGNIFICANT CHANGE UP
BUN SERPL-MCNC: 45.1 MG/DL — HIGH (ref 8–20)
BUN SERPL-MCNC: 45.1 MG/DL — HIGH (ref 8–20)
CALCIUM SERPL-MCNC: 8.8 MG/DL — SIGNIFICANT CHANGE UP (ref 8.4–10.5)
CALCIUM SERPL-MCNC: 8.8 MG/DL — SIGNIFICANT CHANGE UP (ref 8.4–10.5)
CHLORIDE SERPL-SCNC: 109 MMOL/L — HIGH (ref 96–108)
CHLORIDE SERPL-SCNC: 109 MMOL/L — HIGH (ref 96–108)
CO2 SERPL-SCNC: 20 MMOL/L — LOW (ref 22–29)
CO2 SERPL-SCNC: 20 MMOL/L — LOW (ref 22–29)
COLOR SPEC: YELLOW — SIGNIFICANT CHANGE UP
COLOR SPEC: YELLOW — SIGNIFICANT CHANGE UP
CREAT SERPL-MCNC: 1.91 MG/DL — HIGH (ref 0.5–1.3)
CREAT SERPL-MCNC: 1.91 MG/DL — HIGH (ref 0.5–1.3)
DIFF PNL FLD: NEGATIVE — SIGNIFICANT CHANGE UP
DIFF PNL FLD: NEGATIVE — SIGNIFICANT CHANGE UP
EGFR: 37 ML/MIN/1.73M2 — LOW
EGFR: 37 ML/MIN/1.73M2 — LOW
EOSINOPHIL # BLD AUTO: 0.61 K/UL — HIGH (ref 0–0.5)
EOSINOPHIL # BLD AUTO: 0.61 K/UL — HIGH (ref 0–0.5)
EOSINOPHIL NFR BLD AUTO: 5.4 % — SIGNIFICANT CHANGE UP (ref 0–6)
EOSINOPHIL NFR BLD AUTO: 5.4 % — SIGNIFICANT CHANGE UP (ref 0–6)
GLUCOSE SERPL-MCNC: 82 MG/DL — SIGNIFICANT CHANGE UP (ref 70–99)
GLUCOSE SERPL-MCNC: 82 MG/DL — SIGNIFICANT CHANGE UP (ref 70–99)
GLUCOSE UR QL: NEGATIVE MG/DL — SIGNIFICANT CHANGE UP
GLUCOSE UR QL: NEGATIVE MG/DL — SIGNIFICANT CHANGE UP
HCT VFR BLD CALC: 36.8 % — LOW (ref 39–50)
HCT VFR BLD CALC: 36.8 % — LOW (ref 39–50)
HGB BLD-MCNC: 12 G/DL — LOW (ref 13–17)
HGB BLD-MCNC: 12 G/DL — LOW (ref 13–17)
IMM GRANULOCYTES NFR BLD AUTO: 0.3 % — SIGNIFICANT CHANGE UP (ref 0–0.9)
IMM GRANULOCYTES NFR BLD AUTO: 0.3 % — SIGNIFICANT CHANGE UP (ref 0–0.9)
INR BLD: 1.04 RATIO — SIGNIFICANT CHANGE UP (ref 0.85–1.18)
INR BLD: 1.04 RATIO — SIGNIFICANT CHANGE UP (ref 0.85–1.18)
KETONES UR-MCNC: NEGATIVE MG/DL — SIGNIFICANT CHANGE UP
KETONES UR-MCNC: NEGATIVE MG/DL — SIGNIFICANT CHANGE UP
LEUKOCYTE ESTERASE UR-ACNC: NEGATIVE — SIGNIFICANT CHANGE UP
LEUKOCYTE ESTERASE UR-ACNC: NEGATIVE — SIGNIFICANT CHANGE UP
LYMPHOCYTES # BLD AUTO: 1.61 K/UL — SIGNIFICANT CHANGE UP (ref 1–3.3)
LYMPHOCYTES # BLD AUTO: 1.61 K/UL — SIGNIFICANT CHANGE UP (ref 1–3.3)
LYMPHOCYTES # BLD AUTO: 14.2 % — SIGNIFICANT CHANGE UP (ref 13–44)
LYMPHOCYTES # BLD AUTO: 14.2 % — SIGNIFICANT CHANGE UP (ref 13–44)
MCHC RBC-ENTMCNC: 32.4 PG — SIGNIFICANT CHANGE UP (ref 27–34)
MCHC RBC-ENTMCNC: 32.4 PG — SIGNIFICANT CHANGE UP (ref 27–34)
MCHC RBC-ENTMCNC: 32.6 GM/DL — SIGNIFICANT CHANGE UP (ref 32–36)
MCHC RBC-ENTMCNC: 32.6 GM/DL — SIGNIFICANT CHANGE UP (ref 32–36)
MCV RBC AUTO: 99.5 FL — SIGNIFICANT CHANGE UP (ref 80–100)
MCV RBC AUTO: 99.5 FL — SIGNIFICANT CHANGE UP (ref 80–100)
MONOCYTES # BLD AUTO: 1.33 K/UL — HIGH (ref 0–0.9)
MONOCYTES # BLD AUTO: 1.33 K/UL — HIGH (ref 0–0.9)
MONOCYTES NFR BLD AUTO: 11.7 % — SIGNIFICANT CHANGE UP (ref 2–14)
MONOCYTES NFR BLD AUTO: 11.7 % — SIGNIFICANT CHANGE UP (ref 2–14)
NEUTROPHILS # BLD AUTO: 7.71 K/UL — HIGH (ref 1.8–7.4)
NEUTROPHILS # BLD AUTO: 7.71 K/UL — HIGH (ref 1.8–7.4)
NEUTROPHILS NFR BLD AUTO: 67.7 % — SIGNIFICANT CHANGE UP (ref 43–77)
NEUTROPHILS NFR BLD AUTO: 67.7 % — SIGNIFICANT CHANGE UP (ref 43–77)
NITRITE UR-MCNC: NEGATIVE — SIGNIFICANT CHANGE UP
NITRITE UR-MCNC: NEGATIVE — SIGNIFICANT CHANGE UP
PH UR: 5.5 — SIGNIFICANT CHANGE UP (ref 5–8)
PH UR: 5.5 — SIGNIFICANT CHANGE UP (ref 5–8)
PLATELET # BLD AUTO: 282 K/UL — SIGNIFICANT CHANGE UP (ref 150–400)
PLATELET # BLD AUTO: 282 K/UL — SIGNIFICANT CHANGE UP (ref 150–400)
POTASSIUM SERPL-MCNC: 5.4 MMOL/L — HIGH (ref 3.5–5.3)
POTASSIUM SERPL-MCNC: 5.4 MMOL/L — HIGH (ref 3.5–5.3)
POTASSIUM SERPL-SCNC: 5.4 MMOL/L — HIGH (ref 3.5–5.3)
POTASSIUM SERPL-SCNC: 5.4 MMOL/L — HIGH (ref 3.5–5.3)
PROT SERPL-MCNC: 7.3 G/DL — SIGNIFICANT CHANGE UP (ref 6.6–8.7)
PROT SERPL-MCNC: 7.3 G/DL — SIGNIFICANT CHANGE UP (ref 6.6–8.7)
PROT UR-MCNC: SIGNIFICANT CHANGE UP MG/DL
PROT UR-MCNC: SIGNIFICANT CHANGE UP MG/DL
PROTHROM AB SERPL-ACNC: 11.5 SEC — SIGNIFICANT CHANGE UP (ref 9.5–13)
PROTHROM AB SERPL-ACNC: 11.5 SEC — SIGNIFICANT CHANGE UP (ref 9.5–13)
RBC # BLD: 3.7 M/UL — LOW (ref 4.2–5.8)
RBC # BLD: 3.7 M/UL — LOW (ref 4.2–5.8)
RBC # FLD: 12.1 % — SIGNIFICANT CHANGE UP (ref 10.3–14.5)
RBC # FLD: 12.1 % — SIGNIFICANT CHANGE UP (ref 10.3–14.5)
SODIUM SERPL-SCNC: 141 MMOL/L — SIGNIFICANT CHANGE UP (ref 135–145)
SODIUM SERPL-SCNC: 141 MMOL/L — SIGNIFICANT CHANGE UP (ref 135–145)
SP GR SPEC: 1.02 — SIGNIFICANT CHANGE UP (ref 1–1.03)
SP GR SPEC: 1.02 — SIGNIFICANT CHANGE UP (ref 1–1.03)
UROBILINOGEN FLD QL: 1 MG/DL — SIGNIFICANT CHANGE UP (ref 0.2–1)
UROBILINOGEN FLD QL: 1 MG/DL — SIGNIFICANT CHANGE UP (ref 0.2–1)
WBC # BLD: 11.37 K/UL — HIGH (ref 3.8–10.5)
WBC # BLD: 11.37 K/UL — HIGH (ref 3.8–10.5)
WBC # FLD AUTO: 11.37 K/UL — HIGH (ref 3.8–10.5)
WBC # FLD AUTO: 11.37 K/UL — HIGH (ref 3.8–10.5)

## 2023-11-30 PROCEDURE — 99285 EMERGENCY DEPT VISIT HI MDM: CPT

## 2023-11-30 PROCEDURE — 99223 1ST HOSP IP/OBS HIGH 75: CPT

## 2023-11-30 PROCEDURE — 93010 ELECTROCARDIOGRAM REPORT: CPT

## 2023-11-30 PROCEDURE — 71045 X-RAY EXAM CHEST 1 VIEW: CPT | Mod: 26

## 2023-11-30 RX ORDER — TAMSULOSIN HYDROCHLORIDE 0.4 MG/1
0.4 CAPSULE ORAL ONCE
Refills: 0 | Status: COMPLETED | OUTPATIENT
Start: 2023-11-30 | End: 2023-11-30

## 2023-11-30 RX ORDER — MEMANTINE HYDROCHLORIDE 10 MG/1
10 TABLET ORAL ONCE
Refills: 0 | Status: COMPLETED | OUTPATIENT
Start: 2023-11-30 | End: 2023-11-30

## 2023-11-30 RX ORDER — DONEPEZIL HYDROCHLORIDE 10 MG/1
5 TABLET, FILM COATED ORAL AT BEDTIME
Refills: 0 | Status: DISCONTINUED | OUTPATIENT
Start: 2023-12-01 | End: 2023-12-02

## 2023-11-30 RX ORDER — SODIUM CHLORIDE 9 MG/ML
3 INJECTION INTRAMUSCULAR; INTRAVENOUS; SUBCUTANEOUS EVERY 8 HOURS
Refills: 0 | Status: DISCONTINUED | OUTPATIENT
Start: 2023-11-30 | End: 2023-12-05

## 2023-11-30 RX ORDER — HEPARIN SODIUM 5000 [USP'U]/ML
5000 INJECTION INTRAVENOUS; SUBCUTANEOUS EVERY 12 HOURS
Refills: 0 | Status: DISCONTINUED | OUTPATIENT
Start: 2023-11-30 | End: 2023-12-05

## 2023-11-30 RX ORDER — LANOLIN ALCOHOL/MO/W.PET/CERES
3 CREAM (GRAM) TOPICAL AT BEDTIME
Refills: 0 | Status: DISCONTINUED | OUTPATIENT
Start: 2023-11-30 | End: 2023-12-05

## 2023-11-30 RX ORDER — SODIUM CHLORIDE 9 MG/ML
1000 INJECTION INTRAMUSCULAR; INTRAVENOUS; SUBCUTANEOUS
Refills: 0 | Status: DISCONTINUED | OUTPATIENT
Start: 2023-11-30 | End: 2023-12-01

## 2023-11-30 RX ORDER — SERTRALINE 25 MG/1
75 TABLET, FILM COATED ORAL DAILY
Refills: 0 | Status: DISCONTINUED | OUTPATIENT
Start: 2023-11-30 | End: 2023-12-02

## 2023-11-30 RX ORDER — DONEPEZIL HYDROCHLORIDE 10 MG/1
5 TABLET, FILM COATED ORAL ONCE
Refills: 0 | Status: COMPLETED | OUTPATIENT
Start: 2023-11-30 | End: 2023-11-30

## 2023-11-30 RX ORDER — QUETIAPINE FUMARATE 200 MG/1
12.5 TABLET, FILM COATED ORAL AT BEDTIME
Refills: 0 | Status: DISCONTINUED | OUTPATIENT
Start: 2023-12-01 | End: 2023-12-05

## 2023-11-30 RX ORDER — DONEPEZIL HYDROCHLORIDE 10 MG/1
5 TABLET, FILM COATED ORAL DAILY
Refills: 0 | Status: DISCONTINUED | OUTPATIENT
Start: 2023-12-01 | End: 2023-12-01

## 2023-11-30 RX ORDER — ACETAMINOPHEN 500 MG
650 TABLET ORAL EVERY 6 HOURS
Refills: 0 | Status: DISCONTINUED | OUTPATIENT
Start: 2023-11-30 | End: 2023-12-05

## 2023-11-30 RX ORDER — ONDANSETRON 8 MG/1
4 TABLET, FILM COATED ORAL EVERY 8 HOURS
Refills: 0 | Status: DISCONTINUED | OUTPATIENT
Start: 2023-11-30 | End: 2023-12-05

## 2023-11-30 RX ORDER — SODIUM CHLORIDE 9 MG/ML
1000 INJECTION INTRAMUSCULAR; INTRAVENOUS; SUBCUTANEOUS ONCE
Refills: 0 | Status: COMPLETED | OUTPATIENT
Start: 2023-11-30 | End: 2023-11-30

## 2023-11-30 RX ORDER — TAMSULOSIN HYDROCHLORIDE 0.4 MG/1
0.8 CAPSULE ORAL AT BEDTIME
Refills: 0 | Status: DISCONTINUED | OUTPATIENT
Start: 2023-12-01 | End: 2023-12-05

## 2023-11-30 RX ORDER — MEMANTINE HYDROCHLORIDE 10 MG/1
10 TABLET ORAL
Refills: 0 | Status: DISCONTINUED | OUTPATIENT
Start: 2023-12-01 | End: 2023-12-05

## 2023-11-30 RX ADMIN — SODIUM CHLORIDE 3 MILLILITER(S): 9 INJECTION INTRAMUSCULAR; INTRAVENOUS; SUBCUTANEOUS at 22:34

## 2023-11-30 RX ADMIN — TAMSULOSIN HYDROCHLORIDE 0.4 MILLIGRAM(S): 0.4 CAPSULE ORAL at 21:46

## 2023-11-30 RX ADMIN — Medication 3 MILLIGRAM(S): at 22:57

## 2023-11-30 RX ADMIN — SODIUM CHLORIDE 100 MILLILITER(S): 9 INJECTION INTRAMUSCULAR; INTRAVENOUS; SUBCUTANEOUS at 23:12

## 2023-11-30 RX ADMIN — SODIUM CHLORIDE 1000 MILLILITER(S): 9 INJECTION INTRAMUSCULAR; INTRAVENOUS; SUBCUTANEOUS at 18:30

## 2023-11-30 RX ADMIN — DONEPEZIL HYDROCHLORIDE 5 MILLIGRAM(S): 10 TABLET, FILM COATED ORAL at 22:57

## 2023-11-30 RX ADMIN — SERTRALINE 75 MILLIGRAM(S): 25 TABLET, FILM COATED ORAL at 22:58

## 2023-11-30 RX ADMIN — MEMANTINE HYDROCHLORIDE 10 MILLIGRAM(S): 10 TABLET ORAL at 21:47

## 2023-11-30 NOTE — ED PROVIDER NOTE - ATTENDING CONTRIBUTION TO CARE
I, Abdirizak Hayes, performed a face to face bedside interview with this patient regarding history of present illness, review of symptoms and relevant past medical, social and family history.  I completed an independent physical examination. I have communicated the patient’s plan of care and disposition with the resident.  73 year old male with pMH dementia presents with urinary retention and increased Cr from outpt labs.  Gen: NAD, well appearing  CV: RRR  Pul: CTA b/l  Abd: Soft, non-distended, non-tender  Neuro: no focal deficits  Pt with obstructive uropathy, admitted

## 2023-11-30 NOTE — ED ADULT TRIAGE NOTE - CHIEF COMPLAINT QUOTE
no urine output in 24 hours, diarrhea for 3 days and cough.  was recently told his PSA and creatinine were elevated.

## 2023-11-30 NOTE — ED PROVIDER NOTE - OBJECTIVE STATEMENT
Patient is a 72 yo male with PMHx HTN, BPH on tamsulosin, prostatic lesions, dementia presenting from home with elevated creatinine as well as decreased urination for 24 hours. As per patient and wife at bedside, patient has had several days of nonbloody diarrhea, dry cough, congestion; patient received blood work Monday which showed an elevated Creatinine of 2.12, increased from his baseline. Patient has not been able to urinate for 24 hours. Patient has a history of BPH on tamsulosin as well as prostatic lesions with an increased PSA level outpatient on recent blood work. Denies fevers, chills, dizziness, lightheadedness, dysphagia, dysarthria, diplopia, photophobia, syncope, SOB, CP, abdominal pain, neck pain, back pain, diarrhea, dysuria, hematuria, hematochezia, hematemesis, recent travel, sick contacts, leg swelling.

## 2023-11-30 NOTE — ED ADULT NURSE NOTE - OBJECTIVE STATEMENT
Patient is alert and oriented that comes into the ER c/o no urine output in 24 hours, diarrhea for 3 days and cough. Patient states that he was recently told his PSA and creatinine were both elevated.

## 2023-11-30 NOTE — CONSULT NOTE ADULT - SUBJECTIVE AND OBJECTIVE BOX
HPI:  72 y/o male with hx of Alzheimer dementia, HTN, CKD-3 with baseline Cr. of 1.5, PH with known elevated PSA of 5, chronic LE lymphedema. Patient presented to ED w/ Wife c/o difficulty urinating for past 24 hours. History obtained from Wife based on Patient being a poor historian due to Dementia. Patient saw PMD on 11/29 c/o URI symptoms as well as loose stool since 11/24. No reports of sick contacts, travel, or medication changes. Wife reports Patient normally is independent but does at time fall with no reported injuries. Patient was given Zithromax-took 1 dose, Tessalon pearls and told to hydrate. He was called later in the day and notified he was enterovirus pos. and told to stop Zithromax and cont. Tessalon pearls prn. Wife denies any sputum production, blood/mucus in loose stool. No reports of rash, focal weakness, HA, or CP. In the ED Initial vitals stable, c/o inability to urinate and bladder scan with around 300ml. Toscano placed w/o incident and clear urine noted in bag. Wife notes this happened about a year ago when they were vacationing in [place] and he was seen in a Hospital there and dcd with a toscano and leg bag and has been following with a private Urologist who did a TOV and put him on Flomax and has been following him for the elevated PSA as well. Noted to have a renal U/S in 7/23 due to elevated Cr. which noted a left atrophic kidney w/o hemodynamically sig. stenosis. Patient noted to have elevated Cr. of 2.1 on 11/27 and was supposed to see Nephrology in near future. Per Wife he has been on the same BP meds and due to being ill has not been drinking and eating as much as normal over the last week. Cr. here 1.9 with UA showing elevated SG otherwise without evidence of infection. WBC 11.3 without left shift. CXR with poor insp. effort w/o overt infiltrate. No other complaints noted by Patient/Wife at this time.      (30 Nov 2023 23:45)      PAST MEDICAL & SURGICAL HISTORY:  HTN (hypertension)      Dementia      Stage 3 chronic kidney disease      H/O urinary retention      H/O shoulder surgery          Home Medications:  Aricept 5 mg oral tablet: 1 tab(s) orally 2 times a day (01 Dec 2023 00:25)  aspirin 81 mg oral tablet: 1 tab(s) orally once a day (10 Ramesh 2020 06:24)  benzonatate 100 mg oral capsule: 1 cap(s) orally 3 times a day as needed for  cough (01 Dec 2023 00:24)  lisinopril 40 mg oral tablet: 1 tab(s) orally once a day (01 Dec 2023 00:25)  memantine 10 mg oral tablet: 1 tab(s) orally 2 times a day (10 Ramesh 2020 06:24)  multi-vitamin: 1 tab(s) orally once a day (10 Ramesh 2020 06:24)  Seroquel 25 mg oral tablet: 0.5 tab(s) orally once a day (at bedtime) (01 Dec 2023 00:24)  sertraline 50 mg oral tablet: 1.5 tab(s) orally once a day (01 Dec 2023 00:24)  tamsulosin 0.4 mg oral capsule: 2 cap(s) orally once a day (at bedtime) (01 Dec 2023 00:24)  Zetia 10 mg oral tablet: 1 tab(s) orally once a day (at bedtime) (01 Dec 2023 00:25)      Review of Systems: All negative except where noted in HPI    MEDICATIONS  (STANDING):  donepezil 5 milliGRAM(s) Oral daily  donepezil 5 milliGRAM(s) Oral at bedtime  heparin   Injectable 5000 Unit(s) SubCutaneous every 12 hours  memantine 10 milliGRAM(s) Oral two times a day  QUEtiapine 12.5 milliGRAM(s) Oral at bedtime  sertraline 75 milliGRAM(s) Oral daily  sodium chloride 0.9% lock flush 3 milliLiter(s) IV Push every 8 hours  sodium chloride 0.9%. 1000 milliLiter(s) (100 mL/Hr) IV Continuous <Continuous>  tamsulosin 0.8 milliGRAM(s) Oral at bedtime    MEDICATIONS  (PRN):  acetaminophen     Tablet .. 650 milliGRAM(s) Oral every 6 hours PRN Temp greater or equal to 38C (100.4F), Mild Pain (1 - 3)  aluminum hydroxide/magnesium hydroxide/simethicone Suspension 30 milliLiter(s) Oral every 4 hours PRN Dyspepsia  benzonatate 100 milliGRAM(s) Oral three times a day PRN Cough  melatonin 3 milliGRAM(s) Oral at bedtime PRN Insomnia  ondansetron Injectable 4 milliGRAM(s) IV Push every 8 hours PRN Nausea and/or Vomiting      SOCIAL HISTORY:      Vital Signs Last 24 Hrs  T(C): 37 (01 Dec 2023 00:27), Max: 37.1 (30 Nov 2023 23:52)  T(F): 98.6 (01 Dec 2023 00:27), Max: 98.7 (30 Nov 2023 23:52)  HR: 75 (01 Dec 2023 00:27) (75 - 107)  BP: 112/72 (01 Dec 2023 00:27) (105/69 - 116/70)  BP(mean): 85 (30 Nov 2023 23:45) (85 - 85)  RR: 18 (01 Dec 2023 00:27) (16 - 18)  SpO2: 96% (01 Dec 2023 00:27) (95% - 98%)    Parameters below as of 01 Dec 2023 00:27  Patient On (Oxygen Delivery Method): room air        Physical Exam:  General: NAD  HEENT: PERRL, EOMI  Neck: No JVD, FROM without pain  Pulm: Respirations non labored, no accessory muscle use  Abdomen: Soft, NT/ND, without rebound or guarding  Neuro: Alert and oriented x3, no focal deficits    LABS:                      12.0   11.37 )-----------( 282      ( 30 Nov 2023 18:26 )             36.8   11-30  141  |  109<H>  |  45.1<H>  ----------------------------<  82  5.4<H>   |  20.0<L>  |  1.91<H>  Ca    8.8      30 Nov 2023 18:26  Mg     2.2     11-30  TPro  7.3  /  Alb  3.9  /  TBili  0.4  /  DBili  x   /  AST  36  /  ALT  24  /  AlkPhos  77  11-30  PT/INR - ( 30 Nov 2023 18:26 )   PT: 11.5 sec;   INR: 1.04 ratio      A: Patient is a 72 yo M with extensive mhx with urinary retention s/p toscano placement.     Plan:   Cont toscano   If pt ambulating, having BMs, can consider TOV   Plan to be discussed with Dr. Gloria  HPI:  72 y/o male with hx of Alzheimer dementia, HTN, CKD-3 with baseline Cr. of 1.5, PH with known elevated PSA of 5, chronic LE lymphedema. Patient presented to ED w/ Wife c/o difficulty urinating for past 24 hours. History obtained from Wife based on Patient being a poor historian due to Dementia. Patient saw PMD on 11/29 c/o URI symptoms as well as loose stool since 11/24. No reports of sick contacts, travel, or medication changes. Wife reports Patient normally is independent but does at time fall with no reported injuries. Patient was given Zithromax-took 1 dose, Tessalon pearls and told to hydrate. He was called later in the day and notified he was enterovirus pos. and told to stop Zithromax and cont. Tessalon pearls prn. Wife denies any sputum production, blood/mucus in loose stool. No reports of rash, focal weakness, HA, or CP. In the ED Initial vitals stable, c/o inability to urinate and bladder scan with around 300ml. Toscano placed w/o incident and clear urine noted in bag. Wife notes this happened about a year ago when they were vacationing in [place] and he was seen in a Hospital there and dcd with a toscano and leg bag and has been following with a private Urologist who did a TOV and put him on Flomax and has been following him for the elevated PSA as well. Noted to have a renal U/S in 7/23 due to elevated Cr. which noted a left atrophic kidney w/o hemodynamically sig. stenosis. Patient noted to have elevated Cr. of 2.1 on 11/27 and was supposed to see Nephrology in near future. Per Wife he has been on the same BP meds and due to being ill has not been drinking and eating as much as normal over the last week. Cr. here 1.9 with UA showing elevated SG otherwise without evidence of infection. WBC 11.3 without left shift. CXR with poor insp. effort w/o overt infiltrate. No other complaints noted by Patient/Wife at this time.    (30 Nov 2023 23:45)     UROLOGY CONSULT:  Consult requested for urinary retention. Patient seen and examined at bedside             PAST MEDICAL & SURGICAL HISTORY:  HTN (hypertension)      Dementia      Stage 3 chronic kidney disease      H/O urinary retention      H/O shoulder surgery          Home Medications:  Aricept 5 mg oral tablet: 1 tab(s) orally 2 times a day (01 Dec 2023 00:25)  aspirin 81 mg oral tablet: 1 tab(s) orally once a day (10 Ramesh 2020 06:24)  benzonatate 100 mg oral capsule: 1 cap(s) orally 3 times a day as needed for  cough (01 Dec 2023 00:24)  lisinopril 40 mg oral tablet: 1 tab(s) orally once a day (01 Dec 2023 00:25)  memantine 10 mg oral tablet: 1 tab(s) orally 2 times a day (10 Ramesh 2020 06:24)  multi-vitamin: 1 tab(s) orally once a day (10 Ramesh 2020 06:24)  Seroquel 25 mg oral tablet: 0.5 tab(s) orally once a day (at bedtime) (01 Dec 2023 00:24)  sertraline 50 mg oral tablet: 1.5 tab(s) orally once a day (01 Dec 2023 00:24)  tamsulosin 0.4 mg oral capsule: 2 cap(s) orally once a day (at bedtime) (01 Dec 2023 00:24)  Zetia 10 mg oral tablet: 1 tab(s) orally once a day (at bedtime) (01 Dec 2023 00:25)      Review of Systems: All negative except where noted in HPI    MEDICATIONS  (STANDING):  donepezil 5 milliGRAM(s) Oral daily  donepezil 5 milliGRAM(s) Oral at bedtime  heparin   Injectable 5000 Unit(s) SubCutaneous every 12 hours  memantine 10 milliGRAM(s) Oral two times a day  QUEtiapine 12.5 milliGRAM(s) Oral at bedtime  sertraline 75 milliGRAM(s) Oral daily  sodium chloride 0.9% lock flush 3 milliLiter(s) IV Push every 8 hours  sodium chloride 0.9%. 1000 milliLiter(s) (100 mL/Hr) IV Continuous <Continuous>  tamsulosin 0.8 milliGRAM(s) Oral at bedtime    MEDICATIONS  (PRN):  acetaminophen     Tablet .. 650 milliGRAM(s) Oral every 6 hours PRN Temp greater or equal to 38C (100.4F), Mild Pain (1 - 3)  aluminum hydroxide/magnesium hydroxide/simethicone Suspension 30 milliLiter(s) Oral every 4 hours PRN Dyspepsia  benzonatate 100 milliGRAM(s) Oral three times a day PRN Cough  melatonin 3 milliGRAM(s) Oral at bedtime PRN Insomnia  ondansetron Injectable 4 milliGRAM(s) IV Push every 8 hours PRN Nausea and/or Vomiting      SOCIAL HISTORY:      Vital Signs Last 24 Hrs  T(C): 37 (01 Dec 2023 00:27), Max: 37.1 (30 Nov 2023 23:52)  T(F): 98.6 (01 Dec 2023 00:27), Max: 98.7 (30 Nov 2023 23:52)  HR: 75 (01 Dec 2023 00:27) (75 - 107)  BP: 112/72 (01 Dec 2023 00:27) (105/69 - 116/70)  BP(mean): 85 (30 Nov 2023 23:45) (85 - 85)  RR: 18 (01 Dec 2023 00:27) (16 - 18)  SpO2: 96% (01 Dec 2023 00:27) (95% - 98%)    Parameters below as of 01 Dec 2023 00:27  Patient On (Oxygen Delivery Method): room air        Physical Exam:  General: NAD  HEENT: PERRL, EOMI  Neck: No JVD, FROM without pain  Pulm: Respirations non labored, no accessory muscle use  Abdomen: Soft, NT/ND, without rebound or guarding  Neuro: Alert and oriented x3, no focal deficits    LABS:                      12.0   11.37 )-----------( 282      ( 30 Nov 2023 18:26 )             36.8   11-30  141  |  109<H>  |  45.1<H>  ----------------------------<  82  5.4<H>   |  20.0<L>  |  1.91<H>  Ca    8.8      30 Nov 2023 18:26  Mg     2.2     11-30  TPro  7.3  /  Alb  3.9  /  TBili  0.4  /  DBili  x   /  AST  36  /  ALT  24  /  AlkPhos  77  11-30  PT/INR - ( 30 Nov 2023 18:26 )   PT: 11.5 sec;   INR: 1.04 ratio      A: Patient is a 74 yo M with extensive mhx with urinary retention s/p toscano placement.     Plan:   Cont toscano   If pt ambulating, having BMs, can consider TOV   Plan to be discussed with Dr. Gloria  HPI:  72 y/o male with hx of Alzheimer dementia, HTN, CKD-3 with baseline Cr. of 1.5, PH with known elevated PSA of 5, chronic LE lymphedema. Patient presented to ED w/ Wife c/o difficulty urinating for past 24 hours. History obtained from Wife based on Patient being a poor historian due to Dementia. Patient saw PMD on 11/29 c/o URI symptoms as well as loose stool since 11/24. No reports of sick contacts, travel, or medication changes. Wife reports Patient normally is independent but does at time fall with no reported injuries. Patient was given Zithromax-took 1 dose, Tessalon pearls and told to hydrate. He was called later in the day and notified he was enterovirus pos. and told to stop Zithromax and cont. Tessalon pearls prn. Wife denies any sputum production, blood/mucus in loose stool. No reports of rash, focal weakness, HA, or CP. In the ED Initial vitals stable, c/o inability to urinate and bladder scan with around 300ml. Toscano placed w/o incident and clear urine noted in bag. Wife notes this happened about a year ago when they were vacationing in [place] and he was seen in a Hospital there and dcd with a toscano and leg bag and has been following with a private Urologist who did a TOV and put him on Flomax and has been following him for the elevated PSA as well. Noted to have a renal U/S in 7/23 due to elevated Cr. which noted a left atrophic kidney w/o hemodynamically sig. stenosis. Patient noted to have elevated Cr. of 2.1 on 11/27 and was supposed to see Nephrology in near future. Per Wife he has been on the same BP meds and due to being ill has not been drinking and eating as much as normal over the last week. Cr. here 1.9 with UA showing elevated SG otherwise without evidence of infection. WBC 11.3 without left shift. CXR with poor insp. effort w/o overt infiltrate. No other complaints noted by Patient/Wife at this time.    (30 Nov 2023 23:45)     UROLOGY CONSULT:  Consult requested for urinary retention. Patient seen and examined at bedside. Patient is demented, cannot obtain ROS or hx.     PAST MEDICAL & SURGICAL HISTORY:  HTN (hypertension)  Dementia  Stage 3 chronic kidney disease  H/O urinary retention  H/O shoulder surgery    Home Medications:  Aricept 5 mg oral tablet: 1 tab(s) orally 2 times a day (01 Dec 2023 00:25)  aspirin 81 mg oral tablet: 1 tab(s) orally once a day (10 Ramesh 2020 06:24)  benzonatate 100 mg oral capsule: 1 cap(s) orally 3 times a day as needed for  cough (01 Dec 2023 00:24)  lisinopril 40 mg oral tablet: 1 tab(s) orally once a day (01 Dec 2023 00:25)  memantine 10 mg oral tablet: 1 tab(s) orally 2 times a day (10 Ramesh 2020 06:24)  multi-vitamin: 1 tab(s) orally once a day (10 Ramesh 2020 06:24)  Seroquel 25 mg oral tablet: 0.5 tab(s) orally once a day (at bedtime) (01 Dec 2023 00:24)  sertraline 50 mg oral tablet: 1.5 tab(s) orally once a day (01 Dec 2023 00:24)  tamsulosin 0.4 mg oral capsule: 2 cap(s) orally once a day (at bedtime) (01 Dec 2023 00:24)  Zetia 10 mg oral tablet: 1 tab(s) orally once a day (at bedtime) (01 Dec 2023 00:25)    Review of Systems: All negative except where noted in HPI    MEDICATIONS  (STANDING):  donepezil 5 milliGRAM(s) Oral daily  donepezil 5 milliGRAM(s) Oral at bedtime  heparin   Injectable 5000 Unit(s) SubCutaneous every 12 hours  memantine 10 milliGRAM(s) Oral two times a day  QUEtiapine 12.5 milliGRAM(s) Oral at bedtime  sertraline 75 milliGRAM(s) Oral daily  sodium chloride 0.9% lock flush 3 milliLiter(s) IV Push every 8 hours  sodium chloride 0.9%. 1000 milliLiter(s) (100 mL/Hr) IV Continuous <Continuous>  tamsulosin 0.8 milliGRAM(s) Oral at bedtime    MEDICATIONS  (PRN):  acetaminophen     Tablet .. 650 milliGRAM(s) Oral every 6 hours PRN Temp greater or equal to 38C (100.4F), Mild Pain (1 - 3)  aluminum hydroxide/magnesium hydroxide/simethicone Suspension 30 milliLiter(s) Oral every 4 hours PRN Dyspepsia  benzonatate 100 milliGRAM(s) Oral three times a day PRN Cough  melatonin 3 milliGRAM(s) Oral at bedtime PRN Insomnia  ondansetron Injectable 4 milliGRAM(s) IV Push every 8 hours PRN Nausea and/or Vomiting    Vital Signs Last 24 Hrs  T(C): 37 (01 Dec 2023 00:27), Max: 37.1 (30 Nov 2023 23:52)  T(F): 98.6 (01 Dec 2023 00:27), Max: 98.7 (30 Nov 2023 23:52)  HR: 75 (01 Dec 2023 00:27) (75 - 107)  BP: 112/72 (01 Dec 2023 00:27) (105/69 - 116/70)  BP(mean): 85 (30 Nov 2023 23:45) (85 - 85)  RR: 18 (01 Dec 2023 00:27) (16 - 18)  SpO2: 96% (01 Dec 2023 00:27) (95% - 98%)  Parameters below as of 01 Dec 2023 00:27  Patient On (Oxygen Delivery Method): room air    Physical Exam:  General: NAD  HEENT: PERRL, EOMI  Neck: No JVD, FROM without pain  Pulm: Respirations non labored, no accessory muscle use  Abdomen: Soft, NT/ND, without rebound or guarding  Neuro: Alert and oriented x3, no focal deficits    LABS:                      12.0   11.37 )-----------( 282      ( 30 Nov 2023 18:26 )             36.8   11-30  141  |  109<H>  |  45.1<H>  ----------------------------<  82  5.4<H>   |  20.0<L>  |  1.91<H>  Ca    8.8      30 Nov 2023 18:26  Mg     2.2     11-30  TPro  7.3  /  Alb  3.9  /  TBili  0.4  /  DBili  x   /  AST  36  /  ALT  24  /  AlkPhos  77  11-30  PT/INR - ( 30 Nov 2023 18:26 )   PT: 11.5 sec;   INR: 1.04 ratio      A: Patient is a 72 yo M with extensive mhx with urinary retention s/p toscano placement.     Plan:   Cont toscano   If pt ambulating, having BMs, can consider TOV   Plan to be discussed with Dr. Gloria

## 2023-11-30 NOTE — H&P ADULT - CARDIOVASCULAR
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

## 2023-11-30 NOTE — ED PROVIDER NOTE - PROGRESS NOTE DETAILS
SHAMIKA - everton placed. Labs significant for EMANUEL. Will admit to medicine for further monitoring.

## 2023-11-30 NOTE — ED PROVIDER NOTE - CARE PLAN
1 Principal Discharge DX:	EMANUEL (acute kidney injury)  Secondary Diagnosis:	Obstructive uropathy

## 2023-11-30 NOTE — H&P ADULT - NSICDXPASTMEDICALHX_GEN_ALL_CORE_FT
PAST MEDICAL HISTORY:  Dementia     H/O urinary retention     HTN (hypertension)     Stage 3 chronic kidney disease

## 2023-11-30 NOTE — H&P ADULT - HISTORY OF PRESENT ILLNESS
74 y/o male with hx of Alzheimer dementia, HTN, CKD-3 with baseline Cr. of 1.5, PH with known elevated PSA of 5, chronic LE lymphedema. Patient presented to ED w/ Wife c/o difficulty urinating for past 24 hours. History obtained from Wife based on Patient being a poor historian due to Dementia. Patient saw PMD on 11/29 c/o URI symptoms as well as loose stool since 11/24. No reports of sick contacts, travel, or medication changes. Wife reports Patient normally is independent but does at time fall with no reported injuries. Patient was given Zithromax-took 1 dose, Tessalon pearls and told to hydrate. He was called later in the day and notified he was enterovirus pos. and told to stop Zithromax and cont. Tessalon pearls prn. Wife denies any sputum production, blood/mucus in loose stool. No reports of rash, focal weakness, HA, or CP. In the ED Initial vitals stable, c/o inability to urinate and bladder scan with around 300ml. Toscano placed w/o incident and clear urine noted in bag. Wife notes this happened about a year ago when they were vacationing in [place] and he was seen in a Hospital there and dcd with a toscano and leg bag and has been following with a private Urologist who did a TOV and put him on Flomax and has been following him for the elevated PSA as well. Noted to have a renal U/S in 7/23 due to elevated Cr. which noted a left atrophic kidney w/o hemodynamically sig. stenosis. Patient noted to have elevated Cr. of 2.1 on 11/27 and was supposed to see Nephrology in near future. Per Wife he has been on the same BP meds and due to being ill has not been drinking and eating as much as normal over the last week. Cr. here 1.9 with UA showing elevated SG otherwise without evidence of infection. WBC 11.3 without left shift. CXR with poor insp. effort w/o overt infiltrate. No other complaints noted by Patient/Wife at this time.

## 2023-11-30 NOTE — ED ADULT NURSE NOTE - NSFALLUNIVINTERV_ED_ALL_ED
Bed/Stretcher in lowest position, wheels locked, appropriate side rails in place/Call bell, personal items and telephone in reach/Instruct patient to call for assistance before getting out of bed/chair/stretcher/Non-slip footwear applied when patient is off stretcher/[place] to call system/Physically safe environment - no spills, clutter or unnecessary equipment/Purposeful proactive rounding/Room/bathroom lighting operational, light cord in reach

## 2023-11-30 NOTE — H&P ADULT - ASSESSMENT
74 y/o male with EMANUEL on CKD, Urinary retention, Enterovirus infection, Falls, Hx of Dementia, HTN, PH w/ elevated PSA    EMANUEL on CKD:  -Multi-factorial from decreased PO intake, increased sensible/insensible fluid losses from viral infection causing pre-renal state and ACE-I use, and likely some component of obstructive uropathy from know PH  -Baseline Cr. at 1.5  -D/C ACE-I, cont. IVF/PO intake  -Avoid nephrotoxic drugs, renally dose meds as indicated  -UA as above   -Cont. Hollins pending Urology eval-called by ED  -Nephrology eval in AM  -Repeat BMP in AM to trend Cr. and to repeat potassium with hemolyzed specimen   -OOB, ambulate, fall risk  -DVT-P w/ Sub Q heparin/VC boots while in bed     Urinary retention:  -Cont. Hollins  -Cont. Flomax .8mg HS  -TOV pending Urology eval    Enterovirus:  -No Abx indicated at this time  -Supportive care  -Repeat CXR in AM with better insp. effort     Falls:  -No c/o pain, or evidence of injury  -Likely due to dehydration/dementia/Dementia  -Fall risk, ambulate with assistance  -1:1 for impulsivity/fall risk   -PT eval  -Wife reports no assistive devices at baseline     Dementia:  -Resume o/p regimen w/ Aricpet, Namenda, Seroquel, Sertraline    -At risk for hospital induced delirium  -Frequent orientation, avoid sleep interruptions  -Fall risk  -Wife reports feeling safe at home and has help to care for Patient      HTN:  -Hold Lisinopril  -DASH diet  -Trend BP, currently normal range     PH w/ elevated PSA:  -F/U with Urology as o/p    Discussed with ED Attending, ED Nursing, Patient Wife-Desirae at bedside with all questions/Concerns addressed

## 2023-11-30 NOTE — ED PROVIDER NOTE - CLINICAL SUMMARY MEDICAL DECISION MAKING FREE TEXT BOX
Patient is a 72 yo male with PMHx HTN, BPH on tamsulosin, prostatic lesions, dementia presenting from home with elevated creatinine as well as decreased urination for 24 hours. Bedside POCUS with bladder volume approximately 280 cc    Will place toscano for likely retention, UA to r.o UTI, check labs, r.o electrolyte abnormalities and EMANUEL, hydrate, reassess.

## 2023-11-30 NOTE — ED ADULT NURSE REASSESSMENT NOTE - NS ED NURSE REASSESS COMMENT FT1
Report received from RN Lamont.  Patient resting comfortably in bed, respirations even and unlabored.  As per MD Hayes, patient will be ordered labs and then admitted.  Will continue with plan of care.

## 2023-11-30 NOTE — H&P ADULT - TIME BILLING
Reviewing prior medical record, EMS events, ED course, reviewing labs/imaging studies, discussing case with ED attending, examining patient, furnishing H&P, orders, doing medication reconciliation, discussing plan of care with Patient/Wife and answering all their questions.

## 2023-11-30 NOTE — ED PROVIDER NOTE - PHYSICAL EXAMINATION
Gen: no acute distress  Head: normocephalic, atraumatic  Lung: CTAB, no respiratory distress, no wheezing, rales, rhonchi  CV: normal s1/s2, rrr,   Abd: soft, non-tender, non-distended  MSK: No edema, no visible deformities, full range of motion in all 4 extremities  Neuro: No focal neurologic deficits. Mildly confused, at baseline as per wife, demented, following commands, aaox3   Skin: No rash

## 2023-12-01 ENCOUNTER — NON-APPOINTMENT (OUTPATIENT)
Age: 73
End: 2023-12-01

## 2023-12-01 DIAGNOSIS — Z98.890 OTHER SPECIFIED POSTPROCEDURAL STATES: Chronic | ICD-10-CM

## 2023-12-01 LAB
ANION GAP SERPL CALC-SCNC: 11 MMOL/L — SIGNIFICANT CHANGE UP (ref 5–17)
ANION GAP SERPL CALC-SCNC: 11 MMOL/L — SIGNIFICANT CHANGE UP (ref 5–17)
BUN SERPL-MCNC: 32.4 MG/DL — HIGH (ref 8–20)
BUN SERPL-MCNC: 32.4 MG/DL — HIGH (ref 8–20)
CALCIUM SERPL-MCNC: 8.3 MG/DL — LOW (ref 8.4–10.5)
CALCIUM SERPL-MCNC: 8.3 MG/DL — LOW (ref 8.4–10.5)
CHLORIDE SERPL-SCNC: 114 MMOL/L — HIGH (ref 96–108)
CHLORIDE SERPL-SCNC: 114 MMOL/L — HIGH (ref 96–108)
CO2 SERPL-SCNC: 20 MMOL/L — LOW (ref 22–29)
CO2 SERPL-SCNC: 20 MMOL/L — LOW (ref 22–29)
CREAT SERPL-MCNC: 1.8 MG/DL — HIGH (ref 0.5–1.3)
CREAT SERPL-MCNC: 1.8 MG/DL — HIGH (ref 0.5–1.3)
CULTURE RESULTS: NO GROWTH — SIGNIFICANT CHANGE UP
CULTURE RESULTS: NO GROWTH — SIGNIFICANT CHANGE UP
EGFR: 39 ML/MIN/1.73M2 — LOW
EGFR: 39 ML/MIN/1.73M2 — LOW
GLUCOSE SERPL-MCNC: 105 MG/DL — HIGH (ref 70–99)
GLUCOSE SERPL-MCNC: 105 MG/DL — HIGH (ref 70–99)
HCT VFR BLD CALC: 34.2 % — LOW (ref 39–50)
HCT VFR BLD CALC: 34.2 % — LOW (ref 39–50)
HGB BLD-MCNC: 11.5 G/DL — LOW (ref 13–17)
HGB BLD-MCNC: 11.5 G/DL — LOW (ref 13–17)
MAGNESIUM SERPL-MCNC: 2.1 MG/DL — SIGNIFICANT CHANGE UP (ref 1.6–2.6)
MAGNESIUM SERPL-MCNC: 2.1 MG/DL — SIGNIFICANT CHANGE UP (ref 1.6–2.6)
MCHC RBC-ENTMCNC: 33 PG — SIGNIFICANT CHANGE UP (ref 27–34)
MCHC RBC-ENTMCNC: 33 PG — SIGNIFICANT CHANGE UP (ref 27–34)
MCHC RBC-ENTMCNC: 33.6 GM/DL — SIGNIFICANT CHANGE UP (ref 32–36)
MCHC RBC-ENTMCNC: 33.6 GM/DL — SIGNIFICANT CHANGE UP (ref 32–36)
MCV RBC AUTO: 98.3 FL — SIGNIFICANT CHANGE UP (ref 80–100)
MCV RBC AUTO: 98.3 FL — SIGNIFICANT CHANGE UP (ref 80–100)
PHOSPHATE SERPL-MCNC: 3 MG/DL — SIGNIFICANT CHANGE UP (ref 2.4–4.7)
PHOSPHATE SERPL-MCNC: 3 MG/DL — SIGNIFICANT CHANGE UP (ref 2.4–4.7)
PLATELET # BLD AUTO: 284 K/UL — SIGNIFICANT CHANGE UP (ref 150–400)
PLATELET # BLD AUTO: 284 K/UL — SIGNIFICANT CHANGE UP (ref 150–400)
POTASSIUM SERPL-MCNC: 5.1 MMOL/L — SIGNIFICANT CHANGE UP (ref 3.5–5.3)
POTASSIUM SERPL-MCNC: 5.1 MMOL/L — SIGNIFICANT CHANGE UP (ref 3.5–5.3)
POTASSIUM SERPL-SCNC: 5.1 MMOL/L — SIGNIFICANT CHANGE UP (ref 3.5–5.3)
POTASSIUM SERPL-SCNC: 5.1 MMOL/L — SIGNIFICANT CHANGE UP (ref 3.5–5.3)
RBC # BLD: 3.48 M/UL — LOW (ref 4.2–5.8)
RBC # BLD: 3.48 M/UL — LOW (ref 4.2–5.8)
RBC # FLD: 11.9 % — SIGNIFICANT CHANGE UP (ref 10.3–14.5)
RBC # FLD: 11.9 % — SIGNIFICANT CHANGE UP (ref 10.3–14.5)
SODIUM SERPL-SCNC: 145 MMOL/L — SIGNIFICANT CHANGE UP (ref 135–145)
SODIUM SERPL-SCNC: 145 MMOL/L — SIGNIFICANT CHANGE UP (ref 135–145)
SPECIMEN SOURCE: SIGNIFICANT CHANGE UP
SPECIMEN SOURCE: SIGNIFICANT CHANGE UP
WBC # BLD: 8.42 K/UL — SIGNIFICANT CHANGE UP (ref 3.8–10.5)
WBC # BLD: 8.42 K/UL — SIGNIFICANT CHANGE UP (ref 3.8–10.5)
WBC # FLD AUTO: 8.42 K/UL — SIGNIFICANT CHANGE UP (ref 3.8–10.5)
WBC # FLD AUTO: 8.42 K/UL — SIGNIFICANT CHANGE UP (ref 3.8–10.5)

## 2023-12-01 PROCEDURE — 99223 1ST HOSP IP/OBS HIGH 75: CPT

## 2023-12-01 PROCEDURE — 99233 SBSQ HOSP IP/OBS HIGH 50: CPT

## 2023-12-01 PROCEDURE — 71045 X-RAY EXAM CHEST 1 VIEW: CPT | Mod: 26

## 2023-12-01 RX ORDER — OXYCODONE HYDROCHLORIDE 5 MG/1
1 TABLET ORAL
Qty: 0 | Refills: 0 | DISCHARGE

## 2023-12-01 RX ORDER — SODIUM CHLORIDE 9 MG/ML
1000 INJECTION INTRAMUSCULAR; INTRAVENOUS; SUBCUTANEOUS
Refills: 0 | Status: DISCONTINUED | OUTPATIENT
Start: 2023-12-01 | End: 2023-12-02

## 2023-12-01 RX ORDER — QUINAPRIL HYDROCHLORIDE 40 MG/1
1 TABLET, FILM COATED ORAL
Qty: 0 | Refills: 0 | DISCHARGE

## 2023-12-01 RX ORDER — ATORVASTATIN CALCIUM 80 MG/1
1 TABLET, FILM COATED ORAL
Qty: 0 | Refills: 0 | DISCHARGE

## 2023-12-01 RX ORDER — ONDANSETRON 8 MG/1
1 TABLET, FILM COATED ORAL
Qty: 0 | Refills: 0 | DISCHARGE

## 2023-12-01 RX ADMIN — SERTRALINE 75 MILLIGRAM(S): 25 TABLET, FILM COATED ORAL at 12:14

## 2023-12-01 RX ADMIN — DONEPEZIL HYDROCHLORIDE 5 MILLIGRAM(S): 10 TABLET, FILM COATED ORAL at 12:14

## 2023-12-01 RX ADMIN — HEPARIN SODIUM 5000 UNIT(S): 5000 INJECTION INTRAVENOUS; SUBCUTANEOUS at 18:00

## 2023-12-01 RX ADMIN — TAMSULOSIN HYDROCHLORIDE 0.8 MILLIGRAM(S): 0.4 CAPSULE ORAL at 21:41

## 2023-12-01 RX ADMIN — QUETIAPINE FUMARATE 12.5 MILLIGRAM(S): 200 TABLET, FILM COATED ORAL at 21:42

## 2023-12-01 RX ADMIN — DONEPEZIL HYDROCHLORIDE 5 MILLIGRAM(S): 10 TABLET, FILM COATED ORAL at 21:39

## 2023-12-01 RX ADMIN — MEMANTINE HYDROCHLORIDE 10 MILLIGRAM(S): 10 TABLET ORAL at 06:07

## 2023-12-01 RX ADMIN — SODIUM CHLORIDE 3 MILLILITER(S): 9 INJECTION INTRAMUSCULAR; INTRAVENOUS; SUBCUTANEOUS at 06:12

## 2023-12-01 RX ADMIN — HEPARIN SODIUM 5000 UNIT(S): 5000 INJECTION INTRAVENOUS; SUBCUTANEOUS at 06:07

## 2023-12-01 RX ADMIN — SODIUM CHLORIDE 3 MILLILITER(S): 9 INJECTION INTRAMUSCULAR; INTRAVENOUS; SUBCUTANEOUS at 14:09

## 2023-12-01 RX ADMIN — Medication 100 MILLIGRAM(S): at 14:11

## 2023-12-01 RX ADMIN — SODIUM CHLORIDE 100 MILLILITER(S): 9 INJECTION INTRAMUSCULAR; INTRAVENOUS; SUBCUTANEOUS at 12:14

## 2023-12-01 RX ADMIN — SODIUM CHLORIDE 80 MILLILITER(S): 9 INJECTION INTRAMUSCULAR; INTRAVENOUS; SUBCUTANEOUS at 18:02

## 2023-12-01 RX ADMIN — SODIUM CHLORIDE 3 MILLILITER(S): 9 INJECTION INTRAMUSCULAR; INTRAVENOUS; SUBCUTANEOUS at 21:44

## 2023-12-01 RX ADMIN — SODIUM CHLORIDE 80 MILLILITER(S): 9 INJECTION INTRAMUSCULAR; INTRAVENOUS; SUBCUTANEOUS at 21:36

## 2023-12-01 RX ADMIN — MEMANTINE HYDROCHLORIDE 10 MILLIGRAM(S): 10 TABLET ORAL at 18:01

## 2023-12-01 NOTE — PROGRESS NOTE ADULT - SUBJECTIVE AND OBJECTIVE BOX
Patient is a 73y old  Male who presents with a chief complaint of EMANUEL  Urinary retention (30 Nov 2023 23:45)      Patient seen and examined at bedside. < cahrt reviewed   pt is sitting at the bedside   on 1:1 he is awake ,confused to place self and events       ALLERGIES:  No Known Allergies    MEDICATIONS  (STANDING):  donepezil 5 milliGRAM(s) Oral daily  donepezil 5 milliGRAM(s) Oral at bedtime  heparin   Injectable 5000 Unit(s) SubCutaneous every 12 hours  memantine 10 milliGRAM(s) Oral two times a day  QUEtiapine 12.5 milliGRAM(s) Oral at bedtime  sertraline 75 milliGRAM(s) Oral daily  sodium chloride 0.9% lock flush 3 milliLiter(s) IV Push every 8 hours  sodium chloride 0.9%. 1000 milliLiter(s) (100 mL/Hr) IV Continuous <Continuous>  tamsulosin 0.8 milliGRAM(s) Oral at bedtime    MEDICATIONS  (PRN):  acetaminophen     Tablet .. 650 milliGRAM(s) Oral every 6 hours PRN Temp greater or equal to 38C (100.4F), Mild Pain (1 - 3)  aluminum hydroxide/magnesium hydroxide/simethicone Suspension 30 milliLiter(s) Oral every 4 hours PRN Dyspepsia  benzonatate 100 milliGRAM(s) Oral three times a day PRN Cough  melatonin 3 milliGRAM(s) Oral at bedtime PRN Insomnia  ondansetron Injectable 4 milliGRAM(s) IV Push every 8 hours PRN Nausea and/or Vomiting    Vital Signs Last 24 Hrs  T(F): 98 (01 Dec 2023 08:05), Max: 98.7 (30 Nov 2023 23:52)  HR: 82 (01 Dec 2023 08:05) (75 - 107)  BP: 111/66 (01 Dec 2023 08:05) (105/69 - 116/70)  RR: 18 (01 Dec 2023 08:05) (16 - 18)  SpO2: 95% (01 Dec 2023 08:05) (95% - 98%)  I&O's Summary    30 Nov 2023 07:01  -  01 Dec 2023 07:00  --------------------------------------------------------  IN: 0 mL / OUT: 700 mL / NET: -700 mL        PHYSICAL EXAM:  General:   ENT:   Neck:   Lungs:   Cardio: RRR, S1/S2, No murmur  Abdomen: Soft, Nontender, Nondistended; Bowel sounds present  Extremities: No calf tenderness, No pitting edema    LABS:                        11.5   8.42  )-----------( 284      ( 01 Dec 2023 05:52 )             34.2     12-01    145  |  114  |  32.4  ----------------------------<  105  5.1   |  20.0  |  1.80    Ca    8.3      01 Dec 2023 05:52  Phos  3.0     12-01  Mg     2.1     12-01    TPro  7.3  /  Alb  3.9  /  TBili  0.4  /  DBili  x   /  AST  36  /  ALT  24  /  AlkPhos  77  11-30          PT/INR - ( 30 Nov 2023 18:26 )   PT: 11.5 sec;   INR: 1.04 ratio         PTT - ( 30 Nov 2023 18:26 )  PTT:30.5 sec                            Urinalysis Basic - ( 01 Dec 2023 05:52 )    Color: x / Appearance: x / SG: x / pH: x  Gluc: 105 mg/dL / Ketone: x  / Bili: x / Urobili: x   Blood: x / Protein: x / Nitrite: x   Leuk Esterase: x / RBC: x / WBC x   Sq Epi: x / Non Sq Epi: x / Bacteria: x          RADIOLOGY & ADDITIONAL TESTS:       Patient is a 73y old  Male who presents with a chief complaint of EMANUEL  Urinary retention (30 Nov 2023 23:45)      Patient seen and examined at bedside.  chart  reviewed   pt is sitting at the bedside   on 1:1 he is awake ,confused to place self and events       ALLERGIES:  No Known Allergies    MEDICATIONS  (STANDING):  donepezil 5 milliGRAM(s) Oral daily  donepezil 5 milliGRAM(s) Oral at bedtime  heparin   Injectable 5000 Unit(s) SubCutaneous every 12 hours  memantine 10 milliGRAM(s) Oral two times a day  QUEtiapine 12.5 milliGRAM(s) Oral at bedtime  sertraline 75 milliGRAM(s) Oral daily  sodium chloride 0.9% lock flush 3 milliLiter(s) IV Push every 8 hours  sodium chloride 0.9%. 1000 milliLiter(s) (100 mL/Hr) IV Continuous <Continuous>  tamsulosin 0.8 milliGRAM(s) Oral at bedtime    MEDICATIONS  (PRN):  acetaminophen     Tablet .. 650 milliGRAM(s) Oral every 6 hours PRN Temp greater or equal to 38C (100.4F), Mild Pain (1 - 3)  aluminum hydroxide/magnesium hydroxide/simethicone Suspension 30 milliLiter(s) Oral every 4 hours PRN Dyspepsia  benzonatate 100 milliGRAM(s) Oral three times a day PRN Cough  melatonin 3 milliGRAM(s) Oral at bedtime PRN Insomnia  ondansetron Injectable 4 milliGRAM(s) IV Push every 8 hours PRN Nausea and/or Vomiting    Vital Signs Last 24 Hrs  T(F): 98 (01 Dec 2023 08:05), Max: 98.7 (30 Nov 2023 23:52)  HR: 82 (01 Dec 2023 08:05) (75 - 107)  BP: 111/66 (01 Dec 2023 08:05) (105/69 - 116/70)  RR: 18 (01 Dec 2023 08:05) (16 - 18)  SpO2: 95% (01 Dec 2023 08:05) (95% - 98%)  I&O's Summary    30 Nov 2023 07:01  -  01 Dec 2023 07:00  --------------------------------------------------------  IN: 0 mL / OUT: 700 mL / NET: -700 mL        PHYSICAL EXAM:  General: awake confused   Neck: supple ,no JVD   Lungs: CTA bilateral  Cardio: RRR, S1/S2, No murmur  Abdomen: Soft, Nontender, Nondistended; Bowel sounds present  Extremities: No calf tenderness, No pitting edema    LABS:                        11.5   8.42  )-----------( 284      ( 01 Dec 2023 05:52 )             34.2     12-01    145  |  114  |  32.4  ----------------------------<  105  5.1   |  20.0  |  1.80    Ca    8.3      01 Dec 2023 05:52  Phos  3.0     12-01  Mg     2.1     12-01    TPro  7.3  /  Alb  3.9  /  TBili  0.4  /  DBili  x   /  AST  36  /  ALT  24  /  AlkPhos  77  11-30          PT/INR - ( 30 Nov 2023 18:26 )   PT: 11.5 sec;   INR: 1.04 ratio         PTT - ( 30 Nov 2023 18:26 )  PTT:30.5 sec                            Urinalysis Basic - ( 01 Dec 2023 05:52 )    Color: x / Appearance: x / SG: x / pH: x  Gluc: 105 mg/dL / Ketone: x  / Bili: x / Urobili: x   Blood: x / Protein: x / Nitrite: x   Leuk Esterase: x / RBC: x / WBC x   Sq Epi: x / Non Sq Epi: x / Bacteria: x          RADIOLOGY & ADDITIONAL TESTS:

## 2023-12-01 NOTE — CONSULT NOTE ADULT - TIME BILLING
Time spent with review of chart documents, labs, imaging. Direct patient assessment,  formulation of care plan. Discussion with  Interdisciplinary  team  Dr. Schuler, Dr. Austin Toney NP, SW

## 2023-12-01 NOTE — CONSULT NOTE ADULT - SUBJECTIVE AND OBJECTIVE BOX
HPI:  72 y/o male with hx of Alzheimer dementia, HTN, CKD-3 with baseline Cr. of 1.5, PH with known elevated PSA of 5, chronic LE lymphedema. Patient presented to ED w/ Wife c/o difficulty urinating for past 24 hours. History obtained from Wife based on Patient being a poor historian due to Dementia. Patient saw PMD on 11/29 c/o URI symptoms as well as loose stool since 11/24. No reports of sick contacts, travel, or medication changes. Wife reports Patient normally is independent but does at time fall with no reported injuries. Patient was given Zithromax-took 1 dose, Tessalon pearls and told to hydrate. He was called later in the day and notified he was enterovirus pos. and told to stop Zithromax and cont. Tessalon pearls prn. Wife denies any sputum production, blood/mucus in loose stool. No reports of rash, focal weakness, HA, or CP. In the ED Initial vitals stable, c/o inability to urinate and bladder scan with around 300ml. Toscano placed w/o incident and clear urine noted in bag. Wife notes this happened about a year ago when they were vacationing in [place] and he was seen in a Hospital there and dcd with a toscano and leg bag and has been following with a private Urologist who did a TOV and put him on Flomax and has been following him for the elevated PSA as well. Noted to have a renal U/S in 7/23 due to elevated Cr. which noted a left atrophic kidney w/o hemodynamically sig. stenosis. Patient noted to have elevated Cr. of 2.1 on 11/27 and was supposed to see Nephrology in near future. Per Wife he has been on the same BP meds and due to being ill has not been drinking and eating as much as normal over the last week. Cr. here 1.9 with UA showing elevated SG otherwise without evidence of infection. WBC 11.3 without left shift. CXR with poor insp. effort w/o overt infiltrate. No other complaints noted by Patient/Wife at this time.     As above. Patient admitted with urinary retention, EMANUEL on CKD. Toscano place, Urology consulted.   Per wife   with hx of dementia for 5years. He is part of an outpatient Alzheimer's dementia program [facility].    Informed by outpatient provider J. Mongelli NP, patient with poor comprehension, incontinent, ambulatory, speech can be aphasic.  Some issues with behavioral disturbance and takes Seroquel 12.5mg PRN. No reported hx of aspiration      PERTINENT PMH REVIEWED: Yes    PAST MEDICAL & SURGICAL HISTORY:  HTN (hypertension)      Dementia      Stage 3 chronic kidney disease      H/O urinary retention      H/O shoulder surgery    SOCIAL HISTORY:                      Substance history: no hx substance abuse                    Admitted from:  home                     Islam/spirituality:Advent                    Cultural concerns:    Baseline ADLs (prior to admission):  moderately dependent                       Surrogate/HCP/Guardian:  wife Desirae Lanier    FAMILY HISTORY:  FH: HTN (hypertension)        Allergies    No Known Allergies    Intolerances        http://npcrc.org/files/news/[email or web address]_performance_[email or web address]_[email or web address]    Present Symptoms:   Dyspnea:  No   Nausea/Vomiting:  No   Anxiety:   No   Depression: No   Fatigue: Yes   Loss of appetite:  fair appetite  Constipation:  Not reported      Pain:  No              Location            Character            Duration            Factors            Severity            Effect    Pain AD Score:  [email or web address] (press ctrl + left click to view)    Review of Systems: Reviewed    As per HPI -Limited  to obtain due to poor historian      MEDICATIONS  (STANDING):  donepezil 5 milliGRAM(s) Oral daily  donepezil 5 milliGRAM(s) Oral at bedtime  heparin   Injectable 5000 Unit(s) SubCutaneous every 12 hours  memantine 10 milliGRAM(s) Oral two times a day  QUEtiapine 12.5 milliGRAM(s) Oral at bedtime  sertraline 75 milliGRAM(s) Oral daily  sodium chloride 0.9% lock flush 3 milliLiter(s) IV Push every 8 hours  sodium chloride 0.9%. 1000 milliLiter(s) (100 mL/Hr) IV Continuous <Continuous>  tamsulosin 0.8 milliGRAM(s) Oral at bedtime    MEDICATIONS  (PRN):  acetaminophen     Tablet .. 650 milliGRAM(s) Oral every 6 hours PRN Temp greater or equal to 38C (100.4F), Mild Pain (1 - 3)  aluminum hydroxide/magnesium hydroxide/simethicone Suspension 30 milliLiter(s) Oral every 4 hours PRN Dyspepsia  benzonatate 100 milliGRAM(s) Oral three times a day PRN Cough  melatonin 3 milliGRAM(s) Oral at bedtime PRN Insomnia  ondansetron Injectable 4 milliGRAM(s) IV Push every 8 hours PRN Nausea and/or Vomiting      PHYSICAL EXAM:    Vital Signs Last 24 Hrs  T(C): 37.1 (01 Dec 2023 12:35), Max: 37.1 (30 Nov 2023 23:52)  T(F): 98.7 (01 Dec 2023 12:35), Max: 98.7 (30 Nov 2023 23:52)  HR: 78 (01 Dec 2023 12:35) (75 - 107)  BP: 107/67 (01 Dec 2023 12:35) (105/69 - 116/70)  BP(mean): 85 (30 Nov 2023 23:45) (85 - 85)  RR: 18 (01 Dec 2023 12:35) (16 - 18)  SpO2: 96% (01 Dec 2023 12:35) (95% - 98%)    Parameters below as of 01 Dec 2023 12:35  Patient On (Oxygen Delivery Method): nasal cannula    Karnofsky 50%  General:  WD man awake alert NAD  HEENT  NCAT mmm non icteric  Lungs: comfortable  CV: RR  GI:   soft NTND  MSK: moves all extremities, no contractures  Neuro: AOx1  Skin: warm dry  Psych: calm     LABS:                        11.5   8.42  )-----------( 284      ( 01 Dec 2023 05:52 )             34.2     12-01    145  |  114<H>  |  32.4<H>  ----------------------------<  105<H>  5.1   |  20.0<L>  |  1.80<H>    Ca    8.3<L>      01 Dec 2023 05:52  Phos  3.0     12-01  Mg     2.1     12-01    TPro  7.3  /  Alb  3.9  /  TBili  0.4  /  DBili  x   /  AST  36  /  ALT  24  /  AlkPhos  77  11-30    PT/INR - ( 30 Nov 2023 18:26 )   PT: 11.5 sec;   INR: 1.04 ratio         PTT - ( 30 Nov 2023 18:26 )  PTT:30.5 sec  Urinalysis Basic - ( 01 Dec 2023 05:52 )    Color: x / Appearance: x / SG: x / pH: x  Gluc: 105 mg/dL / Ketone: x  / Bili: x / Urobili: x   Blood: x / Protein: x / Nitrite: x   Leuk Esterase: x / RBC: x / WBC x   Sq Epi: x / Non Sq Epi: x / Bacteria: x      I&O's Summary    30 Nov 2023 07:01  -  01 Dec 2023 07:00  --------------------------------------------------------  IN: 0 mL / OUT: 700 mL / NET: -700 mL        RADIOLOGY & ADDITIONAL STUDIES:  Imaging reviewed   < from: Xray Chest 1 View- PORTABLE-Routine (Xray Chest 1 View- PORTABLE-Routine in AM.) (12.01.23 @ 05:17) >  ACC: [id number] EXAM:  XR CHEST PORTABLE ROUTINE 1V   ORDERED BY: CAESAR ANAYA     ACC: [id number] EXAM:  XR CHEST PORTABLE URGENT 1V   ORDERED BY: MARYURI GARCIA     PROCEDURE DATE:  11/30/2023          INTERPRETATION:  CLINICAL HISTORY: Cough    Chest.    Single frontal radiograph of the chest obtained by portable technique   demonstrates the lungs to be free of infiltrates. Lung volumes are   somewhat small, presumed secondary to a suboptimal inspiratory effort.   The costophrenic angles are clear. Heart size cannot adequately be   assessed on this portable study. No gross hilar or mediastinal mass is   noted. There is no evidence of destructive bony lesion. No previous   studies are available for comparison.    IMPRESSION:    No evidence of acute infiltrate or pleural effusion.        CLINICAL HISTORY: Follow-up    Chest.    Single frontal radiograph of the chest was obtained by portable technique   on 12/1/2023 and compared to the patient's previous study of 11/30/2023.   Allowing for technical and positional variations there has been no   discrete interval change.    IMPRESSION:    No significant interval change. No evidence of acute infiltrate.    --- End of Report ---      ROXANNA BUCIO MD; Attending Radiologist  This document has been electronically signed. Dec  1 2023  1:04PM    < end of copied text >    ADVANCE DIRECTIVES/TREATMENT PREFERENCES:  Currently Full code, all aggressive measures desired

## 2023-12-01 NOTE — PROGRESS NOTE ADULT - SUBJECTIVE AND OBJECTIVE BOX
Subjective:73yMale with urinary retention, more difficulty urinating recently, toscano placed in ED, ? output.  Pt with dementia, states he voids every 4 hours or so and sometimes at night, couldn't quantify how many times.  Pt resting comfortably at this time, no distress.    Toscano: yellow, clear    Vital Signs Last 24 Hrs  T(C): 36.7 (01 Dec 2023 08:05), Max: 37.1 (30 Nov 2023 23:52)  T(F): 98 (01 Dec 2023 08:05), Max: 98.7 (30 Nov 2023 23:52)  HR: 82 (01 Dec 2023 08:05) (75 - 107)  BP: 111/66 (01 Dec 2023 08:05) (105/69 - 116/70)  BP(mean): 85 (30 Nov 2023 23:45) (85 - 85)  RR: 18 (01 Dec 2023 08:05) (16 - 18)  SpO2: 95% (01 Dec 2023 08:05) (95% - 98%)    Parameters below as of 01 Dec 2023 08:05  Patient On (Oxygen Delivery Method): room air      I&O's Detail    30 Nov 2023 07:01  -  01 Dec 2023 07:00  --------------------------------------------------------  IN:  Total IN: 0 mL    OUT:    Voided (mL): 700 mL  Total OUT: 700 mL    Total NET: -700 mL          Labs:                        11.5   8.42  )-----------( 284      ( 01 Dec 2023 05:52 )             34.2     12-01    145  |  114<H>  |  32.4<H>  ----------------------------<  105<H>  5.1   |  20.0<L>  |  1.80<H>    Ca    8.3<L>      01 Dec 2023 05:52  Phos  3.0     12-01  Mg     2.1     12-01    TPro  7.3  /  Alb  3.9  /  TBili  0.4  /  DBili  x   /  AST  36  /  ALT  24  /  AlkPhos  77  11-30    PT/INR - ( 30 Nov 2023 18:26 )   PT: 11.5 sec;   INR: 1.04 ratio         PTT - ( 30 Nov 2023 18:26 )  PTT:30.5 sec

## 2023-12-01 NOTE — PROGRESS NOTE ADULT - ASSESSMENT
74 yo male with urinary retention  - cont toscano for now  - ambulate, OOB  - bowel regimen  - prevent constipation  - cont flomax  - consider TOV when ambulatory and moving his bowels 74 yo male with urinary retention  - cont toscano for now  - ambulate, OOB  - bowel regimen  - prevent constipation  - cont flomax  - consider TOV when ambulatory and moving his bowels  - trend creatinine

## 2023-12-01 NOTE — CONSULT NOTE ADULT - ASSESSMENT
73yr man hx advanced dementia, HTN, CKD 3, elevated PSA (5), chronic lymphedema recent viral infection  admitted with urinary retention with EMANUEL on CKD    EMANUEL on CKD  Urinary Retention  s/p toscano  IV fluids  monitor renal functions  avoid nephrotoxic agents  Urology following     Dementia   Patient is followed as an outpatient Alzheimer's  Dementia program with [facility].  Case discussed with Dr. Avila and GABBI Toney NP - outpatient providers  Patient reported at baseline ambulatory, incontinent, verbal/ aphasic. No reported hx of aspiration  FAST6E  Assist with care    Agitation  Reported issues of behavioral disturbance  Takes Seroquel 12.5mg PRN as outpatient  1:1 at bedside  Supportive Care- redirection    Encounter for Palliative Care  Palliative Care consulted to provide support and assist with GOC in the context of serious illness and complex decision making.   Patient is followed as an outpatient Alzheimer's  Dementia program with [facility].  Met with wife Desirae with PC  WILLIE Gregory, Resident Dr. TY Mobley    Desirae states she is the HCP.  Desirae states  dx with dementia 5years ago and has really declined in the last several months.  She has been caring for him with the  assistance of a HHA for a few hours one day week.   She realizes given her 's needs,  she is looking to increase HHA a few more hours.   Desirae was teary in our meeting, stating her  was a very smart man, a retired  and very saddened to see him this way.  She does feel she has the support of their 3 children who live close by as well as family and friends.  Expressed understanding and offered support.    Wife understands 's progressive disease. However, still coming to terms that he will need a lot more help than she currently anticipates.   Palliative team will continue to support wife. Plan for further GOC discussions.                    73yr man hx advanced dementia, HTN, CKD 3, elevated PSA (5), chronic lymphedema recent viral infection  admitted with urinary retention with EMANUEL on CKD    EMANUEL on CKD  Urinary Retention  s/p toscano  IV fluids  monitor renal functions  avoid nephrotoxic agents  Per outpatient NP, patient may have missed taking his Flomax   Urology following     Dementia   Patient is followed as an outpatient Alzheimer's  Dementia program with [facility].  Case discussed with Dr. Avila and GABBI Toney NP - outpatient providers  Patient reported at baseline ambulatory, incontinent, verbal/ aphasic. No reported hx of aspiration  FAST6E  Assist with care    Agitation  Reported issues of behavioral disturbance  Takes Seroquel 12.5mg PRN as outpatient  1:1 at bedside  Supportive Care- redirection    Encounter for Palliative Care  Palliative Care consulted to provide support and assist with GOC in the context of serious illness and complex decision making.   Patient is followed as an outpatient Alzheimer's  Dementia program with [facility].  Met with wife Desirae with PC  WILLIE Gregory, Resident Dr. TY Mobley    Desirae states she is the HCP.  Desirae states  dx with dementia 5years ago and has really declined in the last several months.  She has been caring for him with the  assistance of a HHA for a few hours one day week.   She realizes given her 's needs, she is looking to increase HHA a few more hours.   Desirae was teary in our meeting, stating her  was a very smart man, a retired  and very saddened to see him this way.  She does feel she has the support of their 3 children who live close by as well as family and friends.  Expressed understanding and offered support.    Wife understands 's progressive disease. However, still coming to terms that he will need a lot more help than she currently anticipates.   Palliative team will continue to support wife. Plan for further GOC discussions.                    73yr man hx advanced dementia, HTN, CKD 3, elevated PSA (5), chronic lymphedema recent viral infection  admitted with urinary retention with EMANUEL on CKD    EMANUEL on CKD  Urinary Retention  s/p toscano  IV fluids  monitor renal functions  avoid nephrotoxic agents  Per outpatient NP, patient may have missed taking his Flomax   Urology following     Dementia   Patient is followed as an outpatient Alzheimer's  Dementia program with [facility].  Case discussed with Dr. Avila and GABBI Toney NP - outpatient providers  Patient reported at baseline ambulatory, incontinent, verbal/ aphasic. No reported hx of aspiration  FAST6E  Assist with care    Agitation  Reported history of behavioral disturbance  Takes Seroquel 12.5mg PRN as outpatient  1:1 at bedside  Supportive Care- redirection    Encounter for Palliative Care  Palliative Care consulted to provide support and assist with GOC in the context of serious illness and complex decision making.   Patient is followed as an outpatient Alzheimer's  Dementia program with [facility].  Met with wife Desirae with PC  WILLIE Gregory, Resident Dr. TY Mobley    Desirae states she is the HCP.  Desirae states  dx with dementia 5years ago and has really declined in the last several months.  She has been caring for him with the  assistance of a HHA for a few hours one day week.   She realizes given her 's needs, she is looking to increase HHA a few more hours.   Desirae was teary in our meeting, stating her  was a very smart man, a retired  and very saddened to see him this way.  She does feel she has the support of their 3 children who live close by as well as family and friends.  Expressed understanding and offered support.    Wife understands 's progressive disease. However, still coming to terms that he will need a lot more help than she currently anticipates.   Palliative team will continue to support wife. Plan for further GOC discussions.

## 2023-12-01 NOTE — PHYSICAL THERAPY INITIAL EVALUATION ADULT - ADDITIONAL COMMENTS
Pt poor historian, as per spouse: Pt lives with spouse in a house with 1 [address], and bedroom on the 2nd level with 13 steps. Pt amb without device and has supervision and assist as needed with functional mobility. Pt's spouse assists with ADLs and IADLs. Pt does not drive and is retired. Pt has support of family.

## 2023-12-01 NOTE — PROGRESS NOTE ADULT - ASSESSMENT
72 y/o male with EMANUEL on CKD, Urinary retention, Enterovirus infection, Falls, Hx of Dementia, HTN, PH w/ elevated PSA    1-EMANUEL on CKD stage 3b  -Multi-factorial from decreased PO intake, increased sensible/insensible fluid losses from viral infection causing pre-renal state and ACE-I use, and likely some component of obstructive uropathy from enlarged prostate   -Baseline Cr. at 1.5  off lisinopril   monitor   -OOB, ambulate, fall risk  -DVT-P w/ Sub Q heparin/VC boots while in bed     2-Urinary retention:  -Cont. Hollins  -Cont. Flomax .8mg HS  -TOV pending Urology eval    3-Enterovirus:  -No Abx indicated at this time  -Supportive care    4-Falls:  -No c/o pain, or evidence of injury  -Likely due to dehydration/dementia/Dementia  -Fall risk, ambulate with assistance  -1:1 for impulsivity/fall risk   -PT eval  -Wife reports no assistive devices at baseline     5-Dementia:  -Resume o/p regimen w/ Aricpet, Namenda, Seroquel, Sertraline    -At risk for hospital induced delirium  -Fall risk      6-HTN:  -Hold Lisinopril  -DASH diet  -Trend BP    7- Retention with enlarged prostate    w/ elevated PSA:      Discussed with ED Attending, ED Nursing, Patient Wife-Desirae at bedside with all questions/Concerns addressed  74 y/o male with EMANUEL on CKD, Urinary retention, Enterovirus infection, Falls, Hx of Dementia, HTN, PH w/ elevated PSA    1-EMANUEL on CKD stage 3b  -Multi-factorial from decreased PO intake, increased sensible/insensible fluid losses from viral infection causing pre-renal state and ACE-I use, and likely some component of obstructive uropathy from enlarged prostate   -Baseline Cr. at 1.5  off lisinopril   monitor   -OOB, ambulate, fall risk  -DVT-P w/ Sub Q heparin/VC boots while in bed     2-Urinary retention:  -Cont. Hollins  -Cont. Flomax .8mg HS  -TOV pending Urology eval    3-Enterovirus:  -No Abx indicated at this time  -Supportive care    4-Falls:  -No c/o pain, or evidence of injury  -Likely due to dehydration/dementia/Dementia  -Fall risk, ambulate with assistance  -1:1 for impulsivity/fall risk   -PT eval  -Wife reports no assistive devices at baseline     5-Dementia:  -Resume o/p regimen w/ Aricpet, Namenda, Seroquel, Sertraline    -At risk for hospital induced delirium  -Fall risk      6-HTN:  -Lisinopril on hold   -DASH diet  -Trend BP      Discussed with  Patient Wife-Desirae at bedside

## 2023-12-01 NOTE — PHYSICAL THERAPY INITIAL EVALUATION ADULT - PERTINENT HX OF CURRENT PROBLEM, REHAB EVAL
72 y/o male with EMANUEL on CKD, Urinary retention, Enterovirus infection, Falls, Hx of Dementia, HTN, PH w/ elevated PSA.

## 2023-12-01 NOTE — PHYSICAL THERAPY INITIAL EVALUATION ADULT - GENERAL OBSERVATIONS, REHAB EVAL
Pt received in bed +IV +toscano +1:1 aide on room air with spouse at bedside, pleasant and cooperative

## 2023-12-02 LAB
24R-OH-CALCIDIOL SERPL-MCNC: 29 NG/ML — LOW (ref 30–80)
24R-OH-CALCIDIOL SERPL-MCNC: 29 NG/ML — LOW (ref 30–80)
ANION GAP SERPL CALC-SCNC: 10 MMOL/L — SIGNIFICANT CHANGE UP (ref 5–17)
ANION GAP SERPL CALC-SCNC: 10 MMOL/L — SIGNIFICANT CHANGE UP (ref 5–17)
BUN SERPL-MCNC: 22.7 MG/DL — HIGH (ref 8–20)
BUN SERPL-MCNC: 22.7 MG/DL — HIGH (ref 8–20)
CALCIUM SERPL-MCNC: 8.2 MG/DL — LOW (ref 8.4–10.5)
CALCIUM SERPL-MCNC: 8.2 MG/DL — LOW (ref 8.4–10.5)
CHLORIDE SERPL-SCNC: 116 MMOL/L — HIGH (ref 96–108)
CHLORIDE SERPL-SCNC: 116 MMOL/L — HIGH (ref 96–108)
CO2 SERPL-SCNC: 20 MMOL/L — LOW (ref 22–29)
CO2 SERPL-SCNC: 20 MMOL/L — LOW (ref 22–29)
CREAT SERPL-MCNC: 1.48 MG/DL — HIGH (ref 0.5–1.3)
CREAT SERPL-MCNC: 1.48 MG/DL — HIGH (ref 0.5–1.3)
EGFR: 50 ML/MIN/1.73M2 — LOW
EGFR: 50 ML/MIN/1.73M2 — LOW
FOLATE SERPL-MCNC: 19.1 NG/ML — SIGNIFICANT CHANGE UP
FOLATE SERPL-MCNC: 19.1 NG/ML — SIGNIFICANT CHANGE UP
GLUCOSE SERPL-MCNC: 85 MG/DL — SIGNIFICANT CHANGE UP (ref 70–99)
GLUCOSE SERPL-MCNC: 85 MG/DL — SIGNIFICANT CHANGE UP (ref 70–99)
HCV AB S/CO SERPL IA: 0.05 S/CO — SIGNIFICANT CHANGE UP (ref 0–0.99)
HCV AB S/CO SERPL IA: 0.05 S/CO — SIGNIFICANT CHANGE UP (ref 0–0.99)
HCV AB SERPL-IMP: SIGNIFICANT CHANGE UP
HCV AB SERPL-IMP: SIGNIFICANT CHANGE UP
POTASSIUM SERPL-MCNC: 4.6 MMOL/L — SIGNIFICANT CHANGE UP (ref 3.5–5.3)
POTASSIUM SERPL-MCNC: 4.6 MMOL/L — SIGNIFICANT CHANGE UP (ref 3.5–5.3)
POTASSIUM SERPL-SCNC: 4.6 MMOL/L — SIGNIFICANT CHANGE UP (ref 3.5–5.3)
POTASSIUM SERPL-SCNC: 4.6 MMOL/L — SIGNIFICANT CHANGE UP (ref 3.5–5.3)
SODIUM SERPL-SCNC: 146 MMOL/L — HIGH (ref 135–145)
SODIUM SERPL-SCNC: 146 MMOL/L — HIGH (ref 135–145)
TSH SERPL-MCNC: 1.57 UIU/ML — SIGNIFICANT CHANGE UP (ref 0.27–4.2)
TSH SERPL-MCNC: 1.57 UIU/ML — SIGNIFICANT CHANGE UP (ref 0.27–4.2)
VIT B12 SERPL-MCNC: 624 PG/ML — SIGNIFICANT CHANGE UP (ref 232–1245)
VIT B12 SERPL-MCNC: 624 PG/ML — SIGNIFICANT CHANGE UP (ref 232–1245)

## 2023-12-02 PROCEDURE — 99232 SBSQ HOSP IP/OBS MODERATE 35: CPT

## 2023-12-02 RX ORDER — DONEPEZIL HYDROCHLORIDE 10 MG/1
5 TABLET, FILM COATED ORAL ONCE
Refills: 0 | Status: COMPLETED | OUTPATIENT
Start: 2023-12-02 | End: 2023-12-02

## 2023-12-02 RX ORDER — PREGABALIN 225 MG/1
1000 CAPSULE ORAL DAILY
Refills: 0 | Status: DISCONTINUED | OUTPATIENT
Start: 2023-12-02 | End: 2023-12-05

## 2023-12-02 RX ORDER — SERTRALINE 25 MG/1
75 TABLET, FILM COATED ORAL ONCE
Refills: 0 | Status: DISCONTINUED | OUTPATIENT
Start: 2023-12-02 | End: 2023-12-02

## 2023-12-02 RX ORDER — SODIUM CHLORIDE 9 MG/ML
1000 INJECTION INTRAMUSCULAR; INTRAVENOUS; SUBCUTANEOUS
Refills: 0 | Status: COMPLETED | OUTPATIENT
Start: 2023-12-02 | End: 2023-12-02

## 2023-12-02 RX ORDER — DONEPEZIL HYDROCHLORIDE 10 MG/1
5 TABLET, FILM COATED ORAL AT BEDTIME
Refills: 0 | Status: DISCONTINUED | OUTPATIENT
Start: 2023-12-02 | End: 2023-12-02

## 2023-12-02 RX ORDER — SERTRALINE 25 MG/1
75 TABLET, FILM COATED ORAL DAILY
Refills: 0 | Status: DISCONTINUED | OUTPATIENT
Start: 2023-12-03 | End: 2023-12-05

## 2023-12-02 RX ORDER — DONEPEZIL HYDROCHLORIDE 10 MG/1
5 TABLET, FILM COATED ORAL
Refills: 0 | Status: DISCONTINUED | OUTPATIENT
Start: 2023-12-02 | End: 2023-12-05

## 2023-12-02 RX ADMIN — DONEPEZIL HYDROCHLORIDE 5 MILLIGRAM(S): 10 TABLET, FILM COATED ORAL at 14:41

## 2023-12-02 RX ADMIN — MEMANTINE HYDROCHLORIDE 10 MILLIGRAM(S): 10 TABLET ORAL at 05:33

## 2023-12-02 RX ADMIN — SODIUM CHLORIDE 3 MILLILITER(S): 9 INJECTION INTRAMUSCULAR; INTRAVENOUS; SUBCUTANEOUS at 13:26

## 2023-12-02 RX ADMIN — MEMANTINE HYDROCHLORIDE 10 MILLIGRAM(S): 10 TABLET ORAL at 18:01

## 2023-12-02 RX ADMIN — Medication 3 MILLIGRAM(S): at 20:39

## 2023-12-02 RX ADMIN — SODIUM CHLORIDE 75 MILLILITER(S): 9 INJECTION INTRAMUSCULAR; INTRAVENOUS; SUBCUTANEOUS at 11:12

## 2023-12-02 RX ADMIN — SODIUM CHLORIDE 3 MILLILITER(S): 9 INJECTION INTRAMUSCULAR; INTRAVENOUS; SUBCUTANEOUS at 20:42

## 2023-12-02 RX ADMIN — QUETIAPINE FUMARATE 12.5 MILLIGRAM(S): 200 TABLET, FILM COATED ORAL at 20:41

## 2023-12-02 RX ADMIN — SERTRALINE 75 MILLIGRAM(S): 25 TABLET, FILM COATED ORAL at 11:12

## 2023-12-02 RX ADMIN — DONEPEZIL HYDROCHLORIDE 5 MILLIGRAM(S): 10 TABLET, FILM COATED ORAL at 18:01

## 2023-12-02 RX ADMIN — PREGABALIN 1000 MICROGRAM(S): 225 CAPSULE ORAL at 11:11

## 2023-12-02 RX ADMIN — HEPARIN SODIUM 5000 UNIT(S): 5000 INJECTION INTRAVENOUS; SUBCUTANEOUS at 05:33

## 2023-12-02 RX ADMIN — SODIUM CHLORIDE 3 MILLILITER(S): 9 INJECTION INTRAMUSCULAR; INTRAVENOUS; SUBCUTANEOUS at 05:33

## 2023-12-02 RX ADMIN — HEPARIN SODIUM 5000 UNIT(S): 5000 INJECTION INTRAVENOUS; SUBCUTANEOUS at 18:01

## 2023-12-02 RX ADMIN — TAMSULOSIN HYDROCHLORIDE 0.8 MILLIGRAM(S): 0.4 CAPSULE ORAL at 20:41

## 2023-12-02 NOTE — CONSULT NOTE ADULT - SUBJECTIVE AND OBJECTIVE BOX
HPI: The pt is a 74 yo male (hx of Alzheimer dementia; history obtained from chart) PMH + CKD(III) with reported baseline Screat 1.5 who was brought to hospital with c/o difficulty urinating for several days; toscano placed in the ED with clear urine expressed.  We are called regarding pt's renal insufficiency.       (30 Nov 2023 23:45)      PAST MEDICAL & SURGICAL HISTORY:  HTN (hypertension)      Dementia      Stage 3 chronic kidney disease      H/O urinary retention      H/O shoulder surgery          FAMILY HISTORY:  FH: HTN (hypertension)        Social History:  No current tobacco noted; no etoh nor drug history described in chart    MEDICATIONS  (STANDING):  donepezil 5 milliGRAM(s) Oral at bedtime  heparin   Injectable 5000 Unit(s) SubCutaneous every 12 hours  memantine 10 milliGRAM(s) Oral two times a day  QUEtiapine 12.5 milliGRAM(s) Oral at bedtime  sertraline 75 milliGRAM(s) Oral daily  sodium chloride 0.9% lock flush 3 milliLiter(s) IV Push every 8 hours  sodium chloride 0.9%. 1000 milliLiter(s) (80 mL/Hr) IV Continuous <Continuous>  tamsulosin 0.8 milliGRAM(s) Oral at bedtime    MEDICATIONS  (PRN):  acetaminophen     Tablet .. 650 milliGRAM(s) Oral every 6 hours PRN Temp greater or equal to 38C (100.4F), Mild Pain (1 - 3)  aluminum hydroxide/magnesium hydroxide/simethicone Suspension 30 milliLiter(s) Oral every 4 hours PRN Dyspepsia  benzonatate 100 milliGRAM(s) Oral three times a day PRN Cough  melatonin 3 milliGRAM(s) Oral at bedtime PRN Insomnia  ondansetron Injectable 4 milliGRAM(s) IV Push every 8 hours PRN Nausea and/or Vomiting      Allergies    No Known Allergies    Intolerances        REVIEW OF SYSTEMS:    Difficult to assess      Vital Signs Last 24 Hrs  T(C): 37.4 (02 Dec 2023 05:20), Max: 37.4 (02 Dec 2023 05:20)  T(F): 99.4 (02 Dec 2023 05:20), Max: 99.4 (02 Dec 2023 05:20)  HR: 78 (02 Dec 2023 05:20) (78 - 83)  BP: 93/52 (02 Dec 2023 05:20) (93/52 - 133/79)  BP(mean): --  RR: 18 (02 Dec 2023 05:20) (18 - 18)  SpO2: 98% (02 Dec 2023 05:20) (95% - 98%)    Parameters below as of 02 Dec 2023 05:20  Patient On (Oxygen Delivery Method): room air        PHYSICAL EXAM:  GENERAL: NAD, well-groomed, well-developed  HEAD:  Atraumatic, Normocephalic  EYES: EOMI, PERRLA, conjunctiva and sclera clear  ENMT: No tonsillar erythema, exudates, or enlargement; Moist mucous membranes, Good dentition, No lesions  NECK: Supple, No JVD, Normal thyroid  NERVOUS SYSTEM:  Alert & Oriented X3, Good concentration; Motor Strength 5/5 B/L upper and lower extremities; DTRs 2+ intact and symmetric  CHEST/LUNG: Clear to percussion bilaterally; No rales, rhonchi, wheezing, or rubs  HEART: Regular rate and rhythm; No murmurs, rubs, or gallops  ABDOMEN: Soft, Nontender, Nondistended; Bowel sounds present  EXTREMITIES:  2+ Peripheral Pulses, No clubbing, cyanosis, or edema  LYMPH: No lymphadenopathy noted  SKIN: No rashes or lesions      LABS:                        11.5   8.42  )-----------( 284      ( 01 Dec 2023 05:52 )             34.2     12-01    145  |  114<H>  |  32.4<H>  ----------------------------<  105<H>  5.1   |  20.0<L>  |  1.80<H>      Creatinine: 1.91 mg/dL (11.30.23)    Ca    8.3<L>      01 Dec 2023 05:52  Phos  3.0     12-01  Mg     2.1     12-01    TPro  7.3  /  Alb  3.9  /  TBili  0.4  /  DBili  x   /  AST  36  /  ALT  24  /  AlkPhos  77  11-30    PT/INR - ( 30 Nov 2023 18:26 )   PT: 11.5 sec;   INR: 1.04 ratio         PTT - ( 30 Nov 2023 18:26 )  PTT:30.5 sec  Urinalysis Basic - ( 01 Dec 2023 05:52 )    Color: x / Appearance: x / SG: x / pH: x  Gluc: 105 mg/dL / Ketone: x  / Bili: x / Urobili: x   Blood: x / Protein: x / Nitrite: x   Leuk Esterase: x / RBC: x / WBC x   Sq Epi: x / Non Sq Epi: x / Bacteria: x          RADIOLOGY & ADDITIONAL TESTS:  < from: Xray Chest 1 View- PORTABLE-Routine (Xray Chest 1 View- PORTABLE-Routine in AM.) (12.01.23 @ 05:17) >  ACC: [id number] EXAM:  XR CHEST PORTABLE ROUTINE 1V   ORDERED BY: CAESAR ANAYA     ACC: [id number] EXAM:  XR CHEST PORTABLE URGENT 1V   ORDERED BY: MARYURI GARCIA     PROCEDURE DATE:  11/30/2023          INTERPRETATION:  CLINICAL HISTORY: Cough    Chest.    Single frontal radiograph of the chest obtained by portable technique   demonstrates the lungs to be free of infiltrates. Lung volumes are   somewhat small, presumed secondary to a suboptimal inspiratory effort.   The costophrenic angles are clear. Heart size cannot adequately be   assessed on this portable study. No gross hilar or mediastinal mass is   noted. There is no evidence of destructive bony lesion. No previous   studies are available for comparison.    IMPRESSION:    No evidence of acute infiltrate or pleural effusion.        CLINICAL HISTORY: Follow-up    Chest.    Single frontal radiograph of the chest was obtained by portable technique   on 12/1/2023 and compared to the patient's previous study of 11/30/2023.   Allowing for technical and positional variations there has been no   discrete interval change.    IMPRESSION:    No significant interval change. No evidence of acute infiltrate.    < end of copied text >     HPI: The pt is a 72 yo male (hx of Alzheimer dementia; history obtained from chart) PMH + CKD(III) with reported baseline Screat 1.5 who was brought to hospital with c/o difficulty urinating for several days; toscano placed in the ED with clear urine expressed.  We are called regarding pt's renal insufficiency.       (30 Nov 2023 23:45)      PAST MEDICAL & SURGICAL HISTORY:  HTN (hypertension)      Dementia      Stage 3 chronic kidney disease      H/O urinary retention      H/O shoulder surgery          FAMILY HISTORY:  FH: HTN (hypertension)        Social History:  No current tobacco noted; no etoh nor drug history described in chart    MEDICATIONS  (STANDING):  donepezil 5 milliGRAM(s) Oral at bedtime  heparin   Injectable 5000 Unit(s) SubCutaneous every 12 hours  memantine 10 milliGRAM(s) Oral two times a day  QUEtiapine 12.5 milliGRAM(s) Oral at bedtime  sertraline 75 milliGRAM(s) Oral daily  sodium chloride 0.9% lock flush 3 milliLiter(s) IV Push every 8 hours  sodium chloride 0.9%. 1000 milliLiter(s) (80 mL/Hr) IV Continuous <Continuous>  tamsulosin 0.8 milliGRAM(s) Oral at bedtime    MEDICATIONS  (PRN):  acetaminophen     Tablet .. 650 milliGRAM(s) Oral every 6 hours PRN Temp greater or equal to 38C (100.4F), Mild Pain (1 - 3)  aluminum hydroxide/magnesium hydroxide/simethicone Suspension 30 milliLiter(s) Oral every 4 hours PRN Dyspepsia  benzonatate 100 milliGRAM(s) Oral three times a day PRN Cough  melatonin 3 milliGRAM(s) Oral at bedtime PRN Insomnia  ondansetron Injectable 4 milliGRAM(s) IV Push every 8 hours PRN Nausea and/or Vomiting      Allergies    No Known Allergies    Intolerances        REVIEW OF SYSTEMS:    Difficult to assess due to mental status      Vital Signs Last 24 Hrs  T(C): 37.4 (02 Dec 2023 05:20), Max: 37.4 (02 Dec 2023 05:20)  T(F): 99.4 (02 Dec 2023 05:20), Max: 99.4 (02 Dec 2023 05:20)  HR: 78 (02 Dec 2023 05:20) (78 - 83)  BP: 93/52 (02 Dec 2023 05:20) (93/52 - 133/79)  BP(mean): --  RR: 18 (02 Dec 2023 05:20) (18 - 18)  SpO2: 98% (02 Dec 2023 05:20) (95% - 98%)    Parameters below as of 02 Dec 2023 05:20  Patient On (Oxygen Delivery Method): room air        PHYSICAL EXAM:  GENERAL: Weak, comfortable  HEAD:  Atraumatic, Normocephalic  EYES: Conjunctiva and sclera clear  ENMT: Dry mucous membranes, Good dentition, No lesions  NECK: Supple, No JVD  NERVOUS SYSTEM:  Awake, alert; demented  CHEST/LUNG: Clear bilaterally  HEART: Regular rate and rhythm; No rub  ABDOMEN: Soft, Nontender,+BS  EXTREMITIES:  2+ Peripheral Pulses, No dependent edema  SKIN: No rashes or lesions      LABS:                        11.5   8.42  )-----------( 284      ( 01 Dec 2023 05:52 )             34.2     12-01    145  |  114<H>  |  32.4<H>  ----------------------------<  105<H>  5.1   |  20.0<L>  |  1.80<H>      Creatinine: 1.91 mg/dL (11.30.23)    Basic Metabolic Panel in AM (12.02.23 @ 05:52)   Sodium: 146 mmol/L  Potassium: 4.6 mmol/L  Chloride: 116: Chloride reference range changed from ..10/26/2022   . mmol/L  Carbon Dioxide: 20.0 mmol/L  Anion Gap: 10 mmol/L  Blood Urea Nitrogen: 22.7 mg/dL  Creatinine: 1.48 mg/dL  Glucose: 85 mg/dL  Calcium: 8.2 mg/dL    Ca    8.3<L>      01 Dec 2023 05:52  Phos  3.0     12-01  Mg     2.1     12-01    TPro  7.3  /  Alb  3.9  /  TBili  0.4  /  DBili  x   /  AST  36  /  ALT  24  /  AlkPhos  77  11-30    PT/INR - ( 30 Nov 2023 18:26 )   PT: 11.5 sec;   INR: 1.04 ratio         PTT - ( 30 Nov 2023 18:26 )  PTT:30.5 sec  Urinalysis Basic - ( 01 Dec 2023 05:52 )    Color: x / Appearance: x / SG: x / pH: x  Gluc: 105 mg/dL / Ketone: x  / Bili: x / Urobili: x   Blood: x / Protein: x / Nitrite: x   Leuk Esterase: x / RBC: x / WBC x   Sq Epi: x / Non Sq Epi: x / Bacteria: x          RADIOLOGY & ADDITIONAL TESTS:  < from: Xray Chest 1 View- PORTABLE-Routine (Xray Chest 1 View- PORTABLE-Routine in AM.) (12.01.23 @ 05:17) >  ACC: [id number] EXAM:  XR CHEST PORTABLE ROUTINE 1V   ORDERED BY: CAESAR ANAYA     ACC: [id number] EXAM:  XR CHEST PORTABLE URGENT 1V   ORDERED BY: MARYURI GARCIA     PROCEDURE DATE:  11/30/2023          INTERPRETATION:  CLINICAL HISTORY: Cough    Chest.    Single frontal radiograph of the chest obtained by portable technique   demonstrates the lungs to be free of infiltrates. Lung volumes are   somewhat small, presumed secondary to a suboptimal inspiratory effort.   The costophrenic angles are clear. Heart size cannot adequately be   assessed on this portable study. No gross hilar or mediastinal mass is   noted. There is no evidence of destructive bony lesion. No previous   studies are available for comparison.    IMPRESSION:    No evidence of acute infiltrate or pleural effusion.        CLINICAL HISTORY: Follow-up    Chest.    Single frontal radiograph of the chest was obtained by portable technique   on 12/1/2023 and compared to the patient's previous study of 11/30/2023.   Allowing for technical and positional variations there has been no   discrete interval change.    IMPRESSION:    No significant interval change. No evidence of acute infiltrate.    < end of copied text >

## 2023-12-02 NOTE — CONSULT NOTE ADULT - ASSESSMENT
CKD(III): baseline Screat reportedly 1.5mg/dL  EMANUEL r/o urinary retention, +/- vol depletion  - avoid potential nephrotoxins  - IVF and adjust as needed  - renal sono  - trend labs CKD(III): baseline Screat reportedly 1.5mg/dL  EMANUEL r/o urinary retention, +/- vol depletion---> improved  Hypernatremia  - avoid potential nephrotoxins  - IVF and adjust as needed  - encourage PO fluids  - renal sono  - trend labs

## 2023-12-02 NOTE — PROGRESS NOTE ADULT - ASSESSMENT
72 y/o male with EMANUEL on CKD, Urinary retention, Enterovirus infection, Falls, Hx of Dementia, HTN, PH w/ elevated PSA    1-EMANUEL on CKD stage 3b  -Multi-factorial from decreased PO intake, increased sensible/insensible fluid losses from viral infection causing pre-renal state and ACE-I use, and likely some component of obstructive uropathy from enlarged prostate   -Baseline Cr. at 1.5  cont to hold lisnisopril   na is up   change IVF to lwoer Na consantration    encourage oral fluid intake     2-Urinary retention:  -Cont. Hollins  -Cont. Flomax .8mg HS  -TOV pending Urology eval   follow up re void trial prior DC vs in [facility]     3-Enterovirus infection   resp status is stable   -No Abx indicated at this time  -Supportive care    4-Falls  -No c/o pain, or evidence of injury  -Likely due to dehydration/dementia/Dementia  -Fall risk, ambulate with assistance  -1:1 for impulsivity/fall risk   -PT eval      5-Dementia:  -Resume o/p regimen w/ Aricept, Namenda, Seroquel, Sertraline    -At risk for hospital induced delirium  -Fall risk    6-HTN   -Hold Lisinopril  -DASH diet  -Trend BP        Discussed with wife

## 2023-12-02 NOTE — PROGRESS NOTE ADULT - SUBJECTIVE AND OBJECTIVE BOX
Patient is a 73y old  Male who presents with a chief complaint of EMANUEL  Urinary retention    Pt is seen , wife is at the bedside   He is awake , looks tired sleepy   labs and pt's condition reviewed with the wife         Vital Signs Last 24 Hrs  T(C): 36.9 (02 Dec 2023 08:32), Max: 37.4 (02 Dec 2023 05:20)  T(F): 98.5 (02 Dec 2023 08:32), Max: 99.4 (02 Dec 2023 05:20)  HR: 66 (02 Dec 2023 08:32) (66 - 83)  BP: 126/75 (02 Dec 2023 08:32) (93/52 - 133/79)  BP(mean): --  RR: 18 (02 Dec 2023 08:32) (18 - 18)  SpO2: 93% (02 Dec 2023 08:32) (93% - 98%)    Parameters below as of 02 Dec 2023 08:32  Patient On (Oxygen Delivery Method): room air        General: awake confused   Neck: supple ,no JVD   Lungs: CTA bilateral  Cardio: RRR, S1/S2, No murmur  Abdomen: Soft, Nontender, Nondistended; Bowel sounds present  Extremities: No calf tenderness, No pitting edema   toscano in place                           11.5   8.42  )-----------( 284      ( 01 Dec 2023 05:52 )             34.2   12-02    146<H>  |  116<H>  |  22.7<H>  ----------------------------<  85  4.6   |  20.0<L>  |  1.48<H>    Ca    8.2<L>      02 Dec 2023 05:52  Phos  3.0     12-01  Mg     2.1     12-01    TPro  7.3  /  Alb  3.9  /  TBili  0.4  /  DBili  x   /  AST  36  /  ALT  24  /  AlkPhos  77  11-30      PT/INR - ( 30 Nov 2023 18:26 )   PT: 11.5 sec;   INR: 1.04 ratio         PTT - ( 30 Nov 2023 18:26 )  PTT:30.5 sec                            Urinalysis Basic - ( 01 Dec 2023 05:52 )    Color: x / Appearance: x / SG: x / pH: x  Gluc: 105 mg/dL / Ketone: x  / Bili: x / Urobili: x   Blood: x / Protein: x / Nitrite: x   Leuk Esterase: x / RBC: x / WBC x   Sq Epi: x / Non Sq Epi: x / Bacteria: x          RADIOLOGY & ADDITIONAL TESTS:

## 2023-12-03 LAB
ANION GAP SERPL CALC-SCNC: 11 MMOL/L — SIGNIFICANT CHANGE UP (ref 5–17)
ANION GAP SERPL CALC-SCNC: 11 MMOL/L — SIGNIFICANT CHANGE UP (ref 5–17)
BUN SERPL-MCNC: 18.8 MG/DL — SIGNIFICANT CHANGE UP (ref 8–20)
BUN SERPL-MCNC: 18.8 MG/DL — SIGNIFICANT CHANGE UP (ref 8–20)
CALCIUM SERPL-MCNC: 7.9 MG/DL — LOW (ref 8.4–10.5)
CALCIUM SERPL-MCNC: 7.9 MG/DL — LOW (ref 8.4–10.5)
CHLORIDE SERPL-SCNC: 110 MMOL/L — HIGH (ref 96–108)
CHLORIDE SERPL-SCNC: 110 MMOL/L — HIGH (ref 96–108)
CO2 SERPL-SCNC: 21 MMOL/L — LOW (ref 22–29)
CO2 SERPL-SCNC: 21 MMOL/L — LOW (ref 22–29)
CREAT SERPL-MCNC: 1.25 MG/DL — SIGNIFICANT CHANGE UP (ref 0.5–1.3)
CREAT SERPL-MCNC: 1.25 MG/DL — SIGNIFICANT CHANGE UP (ref 0.5–1.3)
EGFR: 61 ML/MIN/1.73M2 — SIGNIFICANT CHANGE UP
EGFR: 61 ML/MIN/1.73M2 — SIGNIFICANT CHANGE UP
GLUCOSE SERPL-MCNC: 88 MG/DL — SIGNIFICANT CHANGE UP (ref 70–99)
GLUCOSE SERPL-MCNC: 88 MG/DL — SIGNIFICANT CHANGE UP (ref 70–99)
POTASSIUM SERPL-MCNC: 4.2 MMOL/L — SIGNIFICANT CHANGE UP (ref 3.5–5.3)
POTASSIUM SERPL-MCNC: 4.2 MMOL/L — SIGNIFICANT CHANGE UP (ref 3.5–5.3)
POTASSIUM SERPL-SCNC: 4.2 MMOL/L — SIGNIFICANT CHANGE UP (ref 3.5–5.3)
POTASSIUM SERPL-SCNC: 4.2 MMOL/L — SIGNIFICANT CHANGE UP (ref 3.5–5.3)
SODIUM SERPL-SCNC: 142 MMOL/L — SIGNIFICANT CHANGE UP (ref 135–145)
SODIUM SERPL-SCNC: 142 MMOL/L — SIGNIFICANT CHANGE UP (ref 135–145)

## 2023-12-03 PROCEDURE — 99232 SBSQ HOSP IP/OBS MODERATE 35: CPT

## 2023-12-03 RX ORDER — ERGOCALCIFEROL 1.25 MG/1
50000 CAPSULE ORAL
Refills: 0 | Status: DISCONTINUED | OUTPATIENT
Start: 2023-12-03 | End: 2023-12-05

## 2023-12-03 RX ADMIN — MEMANTINE HYDROCHLORIDE 10 MILLIGRAM(S): 10 TABLET ORAL at 06:13

## 2023-12-03 RX ADMIN — TAMSULOSIN HYDROCHLORIDE 0.8 MILLIGRAM(S): 0.4 CAPSULE ORAL at 20:47

## 2023-12-03 RX ADMIN — ERGOCALCIFEROL 50000 UNIT(S): 1.25 CAPSULE ORAL at 13:07

## 2023-12-03 RX ADMIN — MEMANTINE HYDROCHLORIDE 10 MILLIGRAM(S): 10 TABLET ORAL at 18:25

## 2023-12-03 RX ADMIN — QUETIAPINE FUMARATE 12.5 MILLIGRAM(S): 200 TABLET, FILM COATED ORAL at 20:46

## 2023-12-03 RX ADMIN — DONEPEZIL HYDROCHLORIDE 5 MILLIGRAM(S): 10 TABLET, FILM COATED ORAL at 07:25

## 2023-12-03 RX ADMIN — SODIUM CHLORIDE 3 MILLILITER(S): 9 INJECTION INTRAMUSCULAR; INTRAVENOUS; SUBCUTANEOUS at 13:43

## 2023-12-03 RX ADMIN — Medication 100 MILLIGRAM(S): at 12:25

## 2023-12-03 RX ADMIN — SODIUM CHLORIDE 3 MILLILITER(S): 9 INJECTION INTRAMUSCULAR; INTRAVENOUS; SUBCUTANEOUS at 20:47

## 2023-12-03 RX ADMIN — Medication 3 MILLIGRAM(S): at 20:47

## 2023-12-03 RX ADMIN — Medication 650 MILLIGRAM(S): at 09:00

## 2023-12-03 RX ADMIN — HEPARIN SODIUM 5000 UNIT(S): 5000 INJECTION INTRAVENOUS; SUBCUTANEOUS at 18:27

## 2023-12-03 RX ADMIN — SODIUM CHLORIDE 3 MILLILITER(S): 9 INJECTION INTRAMUSCULAR; INTRAVENOUS; SUBCUTANEOUS at 06:14

## 2023-12-03 RX ADMIN — HEPARIN SODIUM 5000 UNIT(S): 5000 INJECTION INTRAVENOUS; SUBCUTANEOUS at 06:14

## 2023-12-03 RX ADMIN — SERTRALINE 75 MILLIGRAM(S): 25 TABLET, FILM COATED ORAL at 07:25

## 2023-12-03 RX ADMIN — PREGABALIN 1000 MICROGRAM(S): 225 CAPSULE ORAL at 07:25

## 2023-12-03 RX ADMIN — DONEPEZIL HYDROCHLORIDE 5 MILLIGRAM(S): 10 TABLET, FILM COATED ORAL at 18:24

## 2023-12-03 RX ADMIN — Medication 650 MILLIGRAM(S): at 10:00

## 2023-12-03 NOTE — PROGRESS NOTE ADULT - SUBJECTIVE AND OBJECTIVE BOX
Patient is a 73y old  Male who presents with a chief complaint of EMANUEL/ retention     Pt is seen in am , awake , confused   no events overnight   d/w Pt team to evaluate     MEDICATIONS  (STANDING):  cyanocobalamin 1000 MICROGram(s) Oral daily  donepezil 5 milliGRAM(s) Oral <User Schedule>  ergocalciferol 44781 Unit(s) Oral every week  heparin   Injectable 5000 Unit(s) SubCutaneous every 12 hours  memantine 10 milliGRAM(s) Oral two times a day  QUEtiapine 12.5 milliGRAM(s) Oral at bedtime  sertraline 75 milliGRAM(s) Oral daily  sodium chloride 0.9% lock flush 3 milliLiter(s) IV Push every 8 hours  tamsulosin 0.8 milliGRAM(s) Oral at bedtime      Vital Signs Last 24 Hrs  T(C): 37.3 (03 Dec 2023 08:40), Max: 37.4 (02 Dec 2023 20:38)  T(F): 99.1 (03 Dec 2023 08:40), Max: 99.3 (02 Dec 2023 20:38)  HR: 74 (03 Dec 2023 08:40) (65 - 93)  BP: 112/71 (03 Dec 2023 08:40) (112/71 - 130/75)  BP(mean): --  RR: 17 (03 Dec 2023 08:40) (17 - 18)  SpO2: 92% (03 Dec 2023 08:40) (92% - 96%)    Parameters below as of 03 Dec 2023 08:40  Patient On (Oxygen Delivery Method): room air      General: awake confused   Lungs: CTA bilateral  Cardio: RRR, S1/S2, No murmur  Abdomen: Soft, Nontender, Nondistended; Bowel sounds present  Extremities: No calf tenderness, No pitting edema  [facility] in place              12-03    142  |  110<H>  |  18.8  ----------------------------<  88  4.2   |  21.0<L>  |  1.25    Ca    7.9<L>      03 Dec 2023 06:17     Patient is a 73y old  Male who presents with a chief complaint of EMANUEL/ retention     Pt is seen in am , awake , confused   no events overnight   d/w Pt team to evaluate     MEDICATIONS  (STANDING):  cyanocobalamin 1000 MICROGram(s) Oral daily  donepezil 5 milliGRAM(s) Oral <User Schedule>  ergocalciferol 82692 Unit(s) Oral every week  heparin   Injectable 5000 Unit(s) SubCutaneous every 12 hours  memantine 10 milliGRAM(s) Oral two times a day  QUEtiapine 12.5 milliGRAM(s) Oral at bedtime  sertraline 75 milliGRAM(s) Oral daily  sodium chloride 0.9% lock flush 3 milliLiter(s) IV Push every 8 hours  tamsulosin 0.8 milliGRAM(s) Oral at bedtime      Vital Signs Last 24 Hrs  T(C): 37.3 (03 Dec 2023 08:40), Max: 37.4 (02 Dec 2023 20:38)  T(F): 99.1 (03 Dec 2023 08:40), Max: 99.3 (02 Dec 2023 20:38)  HR: 74 (03 Dec 2023 08:40) (65 - 93)  BP: 112/71 (03 Dec 2023 08:40) (112/71 - 130/75)  BP(mean): --  RR: 17 (03 Dec 2023 08:40) (17 - 18)  SpO2: 92% (03 Dec 2023 08:40) (92% - 96%)    Parameters below as of 03 Dec 2023 08:40  Patient On (Oxygen Delivery Method): room air      General: awake confused   Lungs: CTA bilateral  Cardio: RRR, S1/S2, No murmur  Abdomen: Soft, Nontender, Nondistended; Bowel sounds present  Extremities: No calf tenderness, No pitting edema  [facility] in place              12-03    142  |  110<H>  |  18.8  ----------------------------<  88  4.2   |  21.0<L>  |  1.25    Ca    7.9<L>      03 Dec 2023 06:17     Patient is a 73y old  Male who presents with a chief complaint of EMANUEL/ retention     Pt is seen in am , awake , confused   no events overnight   d/w Pt team to evaluate     MEDICATIONS  (STANDING):  cyanocobalamin 1000 MICROGram(s) Oral daily  donepezil 5 milliGRAM(s) Oral <User Schedule>  ergocalciferol 02982 Unit(s) Oral every week  heparin   Injectable 5000 Unit(s) SubCutaneous every 12 hours  memantine 10 milliGRAM(s) Oral two times a day  QUEtiapine 12.5 milliGRAM(s) Oral at bedtime  sertraline 75 milliGRAM(s) Oral daily  sodium chloride 0.9% lock flush 3 milliLiter(s) IV Push every 8 hours  tamsulosin 0.8 milliGRAM(s) Oral at bedtime      Vital Signs Last 24 Hrs  T(C): 37.3 (03 Dec 2023 08:40), Max: 37.4 (02 Dec 2023 20:38)  T(F): 99.1 (03 Dec 2023 08:40), Max: 99.3 (02 Dec 2023 20:38)  HR: 74 (03 Dec 2023 08:40) (65 - 93)  BP: 112/71 (03 Dec 2023 08:40) (112/71 - 130/75)  BP(mean): --  RR: 17 (03 Dec 2023 08:40) (17 - 18)  SpO2: 92% (03 Dec 2023 08:40) (92% - 96%)    Parameters below as of 03 Dec 2023 08:40  Patient On (Oxygen Delivery Method): room air      General: awake confused   Lungs: CTA bilateral  Cardio: RRR, S1/S2, No murmur  Abdomen: Soft, Nontender, Nondistended; Bowel sounds present  Extremities: No calf tenderness, No pitting edema  [facility] in place              12-03    142  |  110<H>  |  18.8  ----------------------------<  88  4.2   |  21.0<L>  |  1.25    Ca    7.9<L>      03 Dec 2023 06:17

## 2023-12-03 NOTE — PROGRESS NOTE ADULT - ASSESSMENT
74 y/o male with EMANUEL on CKD, Urinary retention, Enterovirus infection, Falls, Hx of Dementia, HTN, PH w/ elevated PSA    1-EMANUEL on CKD stage 3b  -Multi-factorial from decreased PO intake, increased sensible/insensible fluid losses from viral infection causing pre-renal state and ACE-I use, BPH retention   US of kidney ordered   cr is improved 1.2   hypernatremia improved   s/p IV fluid   no further diarrhea     2-Urinary retention:/ enlarged prostate   ambulate   PT   toscano inserted ,  consulted   eventual void trial pending US   -Cont. Flomax .8mg HS    3-Enterovirus infection   improved   afebrile   -No Abx indicated at this time  -Supportive care  4-Falls  -No c/o pain, or evidence of injury  -Likely due to dehydration/dementia/Dementia  -Fall risk, ambulate with assistance  -1:1 for impulsivity/fall risk   -PT eval  discharge to [facility]       5-Dementia:  -Resume Aricept, Namenda, Seroquel, Sertraline    -At risk for hospital induced delirium  cont 1 :1   fall risk     6-HTN   -Hold Lisinopril  -DASH diet        Discussed with nurse  74 y/o male with EMANUEL on CKD, Urinary retention, Enterovirus infection, Falls, Hx of Dementia, HTN, PH w/ elevated PSA    1-EMANUEL on CKD stage 3b  -Multi-factorial from decreased PO intake, increased sensible/insensible fluid losses from viral infection causing pre-renal state and ACE-I use, BPH retention   US of kidney ordered   cr is improved 1.2   hypernatremia improved   s/p IV fluid   no further diarrhea     2-Urinary retention:/ enlarged prostate   ambulate   PT   toscano inserted ,  consulted   eventual void trial pending US   -Cont. Flomax .8mg HS    3-Enterovirus infection   improved   afebrile   -No Abx indicated at this time  -Supportive care  4-Falls  -No c/o pain, or evidence of injury  -Likely due to dehydration/dementia/Dementia  -Fall risk, ambulate with assistance  -1:1 for impulsivity/fall risk   -PT eval  discharge to [facility]       5-Dementia:  -Resume Aricept, Namenda, Seroquel, Sertraline    -At risk for hospital induced delirium  cont 1 :1   fall risk     6-HTN   -Hold Lisinopril  -DASH diet        Discussed with nurse  72 y/o male with EMANUEL on CKD, Urinary retention, Enterovirus infection, Falls, Hx of Dementia, HTN, PH w/ elevated PSA    1-EMANUEL on CKD stage 3b  -Multi-factorial from decreased PO intake, increased sensible/insensible fluid losses from viral infection causing pre-renal state and ACE-I use, BPH retention   US of kidney ordered   cr is improved 1.2   hypernatremia improved   s/p IV fluid   no further diarrhea     2-Urinary retention:/ enlarged prostate   ambulate   PT   toscano inserted ,  consulted   eventual void trial pending US   -Cont. Flomax .8mg HS    3-Enterovirus infection   improved   afebrile   -No Abx indicated at this time  -Supportive care  4-Falls  -No c/o pain, or evidence of injury  -Likely due to dehydration/dementia/Dementia  -Fall risk, ambulate with assistance  -1:1 for impulsivity/fall risk   -PT eval  discharge to [facility]       5-Dementia:  -Resume Aricept, Namenda, Seroquel, Sertraline    -At risk for hospital induced delirium  cont 1 :1   fall risk     6-HTN   -Hold Lisinopril  -DASH diet        Discussed with nurse

## 2023-12-03 NOTE — PROGRESS NOTE ADULT - ASSESSMENT
CKD(III): baseline Screat reportedly 1.5mg/dL  EMANUEL r/o urinary retention, +/- vol depletion---> resolved to baseline  Hypernatremia resolved  - avoid potential nephrotoxins  - adjust IVF  - encourage PO fluids  - trend labs

## 2023-12-04 LAB
ALBUMIN SERPL ELPH-MCNC: 3.3 G/DL — SIGNIFICANT CHANGE UP (ref 3.3–5.2)
ALBUMIN SERPL ELPH-MCNC: 3.3 G/DL — SIGNIFICANT CHANGE UP (ref 3.3–5.2)
ALP SERPL-CCNC: 65 U/L — SIGNIFICANT CHANGE UP (ref 40–120)
ALP SERPL-CCNC: 65 U/L — SIGNIFICANT CHANGE UP (ref 40–120)
ALT FLD-CCNC: 20 U/L — SIGNIFICANT CHANGE UP
ALT FLD-CCNC: 20 U/L — SIGNIFICANT CHANGE UP
ANION GAP SERPL CALC-SCNC: 10 MMOL/L — SIGNIFICANT CHANGE UP (ref 5–17)
ANION GAP SERPL CALC-SCNC: 10 MMOL/L — SIGNIFICANT CHANGE UP (ref 5–17)
AST SERPL-CCNC: 19 U/L — SIGNIFICANT CHANGE UP
AST SERPL-CCNC: 19 U/L — SIGNIFICANT CHANGE UP
BILIRUB SERPL-MCNC: 0.3 MG/DL — LOW (ref 0.4–2)
BILIRUB SERPL-MCNC: 0.3 MG/DL — LOW (ref 0.4–2)
BUN SERPL-MCNC: 17.2 MG/DL — SIGNIFICANT CHANGE UP (ref 8–20)
BUN SERPL-MCNC: 17.2 MG/DL — SIGNIFICANT CHANGE UP (ref 8–20)
CALCIUM SERPL-MCNC: 8.5 MG/DL — SIGNIFICANT CHANGE UP (ref 8.4–10.5)
CALCIUM SERPL-MCNC: 8.5 MG/DL — SIGNIFICANT CHANGE UP (ref 8.4–10.5)
CHLORIDE SERPL-SCNC: 109 MMOL/L — HIGH (ref 96–108)
CHLORIDE SERPL-SCNC: 109 MMOL/L — HIGH (ref 96–108)
CO2 SERPL-SCNC: 22 MMOL/L — SIGNIFICANT CHANGE UP (ref 22–29)
CO2 SERPL-SCNC: 22 MMOL/L — SIGNIFICANT CHANGE UP (ref 22–29)
CREAT SERPL-MCNC: 1.36 MG/DL — HIGH (ref 0.5–1.3)
CREAT SERPL-MCNC: 1.36 MG/DL — HIGH (ref 0.5–1.3)
EGFR: 55 ML/MIN/1.73M2 — LOW
EGFR: 55 ML/MIN/1.73M2 — LOW
GLUCOSE SERPL-MCNC: 88 MG/DL — SIGNIFICANT CHANGE UP (ref 70–99)
GLUCOSE SERPL-MCNC: 88 MG/DL — SIGNIFICANT CHANGE UP (ref 70–99)
POTASSIUM SERPL-MCNC: 4.1 MMOL/L — SIGNIFICANT CHANGE UP (ref 3.5–5.3)
POTASSIUM SERPL-MCNC: 4.1 MMOL/L — SIGNIFICANT CHANGE UP (ref 3.5–5.3)
POTASSIUM SERPL-SCNC: 4.1 MMOL/L — SIGNIFICANT CHANGE UP (ref 3.5–5.3)
POTASSIUM SERPL-SCNC: 4.1 MMOL/L — SIGNIFICANT CHANGE UP (ref 3.5–5.3)
PROT SERPL-MCNC: 5.9 G/DL — LOW (ref 6.6–8.7)
PROT SERPL-MCNC: 5.9 G/DL — LOW (ref 6.6–8.7)
RAPID RVP RESULT: DETECTED
RAPID RVP RESULT: DETECTED
RV+EV RNA SPEC QL NAA+PROBE: DETECTED
RV+EV RNA SPEC QL NAA+PROBE: DETECTED
SARS-COV-2 RNA SPEC QL NAA+PROBE: SIGNIFICANT CHANGE UP
SARS-COV-2 RNA SPEC QL NAA+PROBE: SIGNIFICANT CHANGE UP
SODIUM SERPL-SCNC: 141 MMOL/L — SIGNIFICANT CHANGE UP (ref 135–145)
SODIUM SERPL-SCNC: 141 MMOL/L — SIGNIFICANT CHANGE UP (ref 135–145)

## 2023-12-04 PROCEDURE — 99233 SBSQ HOSP IP/OBS HIGH 50: CPT

## 2023-12-04 PROCEDURE — 99232 SBSQ HOSP IP/OBS MODERATE 35: CPT

## 2023-12-04 PROCEDURE — 76770 US EXAM ABDO BACK WALL COMP: CPT | Mod: 26

## 2023-12-04 PROCEDURE — 71045 X-RAY EXAM CHEST 1 VIEW: CPT | Mod: 26

## 2023-12-04 RX ORDER — LISINOPRIL 2.5 MG/1
2.5 TABLET ORAL DAILY
Refills: 0 | Status: DISCONTINUED | OUTPATIENT
Start: 2023-12-04 | End: 2023-12-05

## 2023-12-04 RX ORDER — QUETIAPINE FUMARATE 200 MG/1
12.5 TABLET, FILM COATED ORAL
Refills: 0 | Status: DISCONTINUED | OUTPATIENT
Start: 2023-12-04 | End: 2023-12-05

## 2023-12-04 RX ADMIN — HEPARIN SODIUM 5000 UNIT(S): 5000 INJECTION INTRAVENOUS; SUBCUTANEOUS at 17:29

## 2023-12-04 RX ADMIN — QUETIAPINE FUMARATE 12.5 MILLIGRAM(S): 200 TABLET, FILM COATED ORAL at 20:39

## 2023-12-04 RX ADMIN — TAMSULOSIN HYDROCHLORIDE 0.8 MILLIGRAM(S): 0.4 CAPSULE ORAL at 20:39

## 2023-12-04 RX ADMIN — MEMANTINE HYDROCHLORIDE 10 MILLIGRAM(S): 10 TABLET ORAL at 17:29

## 2023-12-04 RX ADMIN — SODIUM CHLORIDE 3 MILLILITER(S): 9 INJECTION INTRAMUSCULAR; INTRAVENOUS; SUBCUTANEOUS at 14:13

## 2023-12-04 RX ADMIN — PREGABALIN 1000 MICROGRAM(S): 225 CAPSULE ORAL at 11:08

## 2023-12-04 RX ADMIN — Medication 3 MILLIGRAM(S): at 20:39

## 2023-12-04 RX ADMIN — HEPARIN SODIUM 5000 UNIT(S): 5000 INJECTION INTRAVENOUS; SUBCUTANEOUS at 04:49

## 2023-12-04 RX ADMIN — SODIUM CHLORIDE 3 MILLILITER(S): 9 INJECTION INTRAMUSCULAR; INTRAVENOUS; SUBCUTANEOUS at 20:42

## 2023-12-04 RX ADMIN — DONEPEZIL HYDROCHLORIDE 5 MILLIGRAM(S): 10 TABLET, FILM COATED ORAL at 17:29

## 2023-12-04 RX ADMIN — Medication 100 MILLIGRAM(S): at 15:49

## 2023-12-04 RX ADMIN — SERTRALINE 75 MILLIGRAM(S): 25 TABLET, FILM COATED ORAL at 11:08

## 2023-12-04 RX ADMIN — DONEPEZIL HYDROCHLORIDE 5 MILLIGRAM(S): 10 TABLET, FILM COATED ORAL at 11:08

## 2023-12-04 RX ADMIN — SODIUM CHLORIDE 3 MILLILITER(S): 9 INJECTION INTRAMUSCULAR; INTRAVENOUS; SUBCUTANEOUS at 04:49

## 2023-12-04 RX ADMIN — MEMANTINE HYDROCHLORIDE 10 MILLIGRAM(S): 10 TABLET ORAL at 04:49

## 2023-12-04 NOTE — PROGRESS NOTE ADULT - ASSESSMENT
74 y/o male with EMANUEL on CKD, Urinary retention, Enterovirus infection, Falls, Hx of Dementia, HTN, PH w/ elevated PSA    1-EMANUEL on CKD stage 3b  -Multi-factorial from decreased PO intake, increased sensible/insensible fluid losses from viral infection causing pre-renal state and ACE-I use, BPH retention   US of kidney ordered   cr is improved 1.2   hypernatremia improved   s/p IV fluid   no further diarrhea     2-Urinary retention:/ enlarged prostate   ambulate   PT   toscano inserted ,  consulted   eventual void trial pending US   -Cont. Flomax .8mg HS    3-Enterovirus infection   improved   afebrile   -No Abx indicated at this time  -Supportive care  4-Falls  -No c/o pain, or evidence of injury  -Likely due to dehydration/dementia/Dementia  -Fall risk, ambulate with assistance  -1:1 for impulsivity/fall risk   -PT eval  discharge to [facility]       5-Dementia:  -Resume Aricept, Namenda, Seroquel, Sertraline    -At risk for hospital induced delirium  cont 1 :1   fall risk     6-HTN   -Hold Lisinopril  -DASH diet        Discussed with nurse  74 y/o male with EMANUEL on CKD, Urinary retention, Enterovirus infection, Falls, Hx of Dementia, HTN, PH w/ elevated PSA    1-EMANUEL on CKD stage 3b  -Multi-factorial from decreased PO intake, increased sensible/insensible fluid losses from viral infection causing pre-renal state and ACE-I use, BPH retention   US of kidney ordered   cr is improved 1.2   hypernatremia improved   s/p IV fluid   no further diarrhea     2-Urinary retention:/ enlarged prostate   ambulate   PT   toscano inserted ,  consulted   eventual void trial pending US   -Cont. Flomax .8mg HS    3-Enterovirus infection   improved   afebrile   -No Abx indicated at this time  -Supportive care  4-Falls  -No c/o pain, or evidence of injury  -Likely due to dehydration/dementia/Dementia  -Fall risk, ambulate with assistance  -1:1 for impulsivity/fall risk   -PT eval  discharge to [facility]       5-Dementia:  -Resume Aricept, Namenda, Seroquel, Sertraline    -At risk for hospital induced delirium  cont 1 :1   fall risk     6-HTN   -Hold Lisinopril  -DASH diet        Discussed with nurse

## 2023-12-04 NOTE — PROGRESS NOTE ADULT - SUBJECTIVE AND OBJECTIVE BOX
Patient is a 73y old  Male who presents with a chief complaint of EMANUEL/ retention         Vital Signs Last 24 Hrs  T(C): 36.8 (04 Dec 2023 08:00), Max: 37.5 (03 Dec 2023 20:03)  T(F): 98.3 (04 Dec 2023 08:00), Max: 99.5 (03 Dec 2023 20:03)  HR: 66 (04 Dec 2023 08:00) (62 - 83)  BP: 117/74 (04 Dec 2023 08:00) (117/74 - 136/81)  BP(mean): --  RR: 18 (04 Dec 2023 08:00) (17 - 18)  SpO2: 98% (04 Dec 2023 08:00) (94% - 98%)    Parameters below as of 04 Dec 2023 08:00  Patient On (Oxygen Delivery Method): room air        General: awake confused   Lungs: CTA bilateral  Cardio: RRR, S1/S2, No murmur  Abdomen: Soft, Nontender, Nondistended; Bowel sounds present  Extremities: No calf tenderness, No pitting edema  [facility] in place             12-04    141  |  109<H>  |  17.2  ----------------------------<  88  4.1   |  22.0  |  1.36<H>    Ca    8.5      04 Dec 2023 06:20    TPro  5.9<L>  /  Alb  3.3  /  TBili  0.3<L>  /  DBili  x   /  AST  19  /  ALT  20  /  AlkPhos  65  12-04   Patient is a 73y old  Male who presents with a chief complaint of EMANUEL/ retention         Vital Signs Last 24 Hrs  T(C): 36.8 (04 Dec 2023 08:00), Max: 37.5 (03 Dec 2023 20:03)  T(F): 98.3 (04 Dec 2023 08:00), Max: 99.5 (03 Dec 2023 20:03)  HR: 66 (04 Dec 2023 08:00) (62 - 83)  BP: 117/74 (04 Dec 2023 08:00) (117/74 - 136/81)  BP(mean): --  RR: 18 (04 Dec 2023 08:00) (17 - 18)  SpO2: 98% (04 Dec 2023 08:00) (94% - 98%)    Parameters below as of 04 Dec 2023 08:00  Patient On (Oxygen Delivery Method): room air        General: awake confused   Lungs: CTA bilateral  Cardio: RRR, S1/S2, No murmur  Abdomen: Soft, Nontender, Nondistended; Bowel sounds present  Extremities: No calf tenderness, No pitting edema  [facility] in place             12-04    141  |  109<H>  |  17.2  ----------------------------<  88  4.1   |  22.0  |  1.36<H>    Ca    8.5      04 Dec 2023 06:20    TPro  5.9<L>  /  Alb  3.3  /  TBili  0.3<L>  /  DBili  x   /  AST  19  /  ALT  20  /  AlkPhos  65  12-04

## 2023-12-04 NOTE — PROGRESS NOTE ADULT - SUBJECTIVE AND OBJECTIVE BOX
CC:  Follow up GOC , Symptoms    OVERNIGHT EVENTS:  no new issues reported    Present Symptoms:   Dyspnea:  No  Nausea/Vomiting:  No   Anxiety:  No   Depression:  No    Fatigue:  Yes     Loss of appetite:  No   Constipation: No    Pain: No            Location            Duration            Character            Severity            Factors            Effect    Pain AD Score:  [email or web address] (press ctrl + left click to view)    Review of Systems: Reviewed as above  All others negative    MEDICATIONS  (STANDING):  cyanocobalamin 1000 MICROGram(s) Oral daily  donepezil 5 milliGRAM(s) Oral <User Schedule>  ergocalciferol 77331 Unit(s) Oral every week  heparin   Injectable 5000 Unit(s) SubCutaneous every 12 hours  lisinopril 2.5 milliGRAM(s) Oral daily  memantine 10 milliGRAM(s) Oral two times a day  QUEtiapine 12.5 milliGRAM(s) Oral at bedtime  sertraline 75 milliGRAM(s) Oral daily  sodium chloride 0.9% lock flush 3 milliLiter(s) IV Push every 8 hours  tamsulosin 0.8 milliGRAM(s) Oral at bedtime    MEDICATIONS  (PRN):  acetaminophen     Tablet .. 650 milliGRAM(s) Oral every 6 hours PRN Temp greater or equal to 38C (100.4F), Mild Pain (1 - 3)  aluminum hydroxide/magnesium hydroxide/simethicone Suspension 30 milliLiter(s) Oral every 4 hours PRN Dyspepsia  benzonatate 100 milliGRAM(s) Oral three times a day PRN Cough  melatonin 3 milliGRAM(s) Oral at bedtime PRN Insomnia  ondansetron Injectable 4 milliGRAM(s) IV Push every 8 hours PRN Nausea and/or Vomiting      PHYSICAL EXAM:    Vital Signs Last 24 Hrs  T(C): 36.8 (04 Dec 2023 08:00), Max: 37.5 (03 Dec 2023 20:03)  T(F): 98.3 (04 Dec 2023 08:00), Max: 99.5 (03 Dec 2023 20:03)  HR: 66 (04 Dec 2023 08:00) (62 - 83)  BP: 117/74 (04 Dec 2023 08:00) (117/74 - 136/81)  BP(mean): --  RR: 18 (04 Dec 2023 08:00) (17 - 18)  SpO2: 98% (04 Dec 2023 08:00) (94% - 98%)    Parameters below as of 04 Dec 2023 08:00  Patient On (Oxygen Delivery Method): room air      Karnofsky: 50 %  General:  M awake alert       HEENT:  NCAT      Lungs: comfortable  non labored  CV:  RR  GI:   soft NTND  MSK: moves all extremities, no contractures  Skin:  warm/dry  Neuro  no focal deficits  Psych calm, no agitation    LABS:      12-04    141  |  109<H>  |  17.2  ----------------------------<  88  4.1   |  22.0  |  1.36<H>    Ca    8.5      04 Dec 2023 06:20    TPro  5.9<L>  /  Alb  3.3  /  TBili  0.3<L>  /  DBili  x   /  AST  19  /  ALT  20  /  AlkPhos  65  12-04      Urinalysis Basic - ( 04 Dec 2023 06:20 )    Color: x / Appearance: x / SG: x / pH: x  Gluc: 88 mg/dL / Ketone: x  / Bili: x / Urobili: x   Blood: x / Protein: x / Nitrite: x   Leuk Esterase: x / RBC: x / WBC x   Sq Epi: x / Non Sq Epi: x / Bacteria: x      I&O's Summary    03 Dec 2023 07:01  -  04 Dec 2023 07:00  --------------------------------------------------------  IN: 640 mL / OUT: 3850 mL / NET: -3210 mL        RADIOLOGY & ADDITIONAL STUDIES:  Imaging Reviewed  ( x  )   < from: Xray Chest 1 View- PORTABLE-Urgent (Xray Chest 1 View- PORTABLE-Urgent .) (12.04.23 @ 09:22) >    ACC: [id number] EXAM:  XR CHEST PORTABLE URGENT 1V   ORDERED BY: MARLYN JOHNSON     PROCEDURE DATE:  12/04/2023          INTERPRETATION:  EXAM: XR CHEST URGENT    INDICATION: Admitting Dxs: N17.9 UNABLE TO URINATE Cough/Fever XXR    COMPARISON: December 1, 2023    IMPRESSION: No focal infiltrate or congestion. Heart is within normal   limits in its transthoracic diameter. Regional osseous structures   appropriate for age    --- End of Report ---    < end of copied text >        ADVANCE DIRECTIVE PREFERENCES    Full Code   CC:  Follow up GOC , Symptoms    OVERNIGHT EVENTS:  no new issues reported    Present Symptoms:   Dyspnea:  No  Nausea/Vomiting:  No   Anxiety:  No   Depression:  No    Fatigue:  Yes     Loss of appetite:  No   Constipation: No    Pain: No            Location            Duration            Character            Severity            Factors            Effect    Pain AD Score:  [email or web address]/[email or web address] (press ctrl + left click to view)    Review of Systems: Reviewed as above  All others negative    MEDICATIONS  (STANDING):  cyanocobalamin 1000 MICROGram(s) Oral daily  donepezil 5 milliGRAM(s) Oral <User Schedule>  ergocalciferol 98989 Unit(s) Oral every week  heparin   Injectable 5000 Unit(s) SubCutaneous every 12 hours  lisinopril 2.5 milliGRAM(s) Oral daily  memantine 10 milliGRAM(s) Oral two times a day  QUEtiapine 12.5 milliGRAM(s) Oral at bedtime  sertraline 75 milliGRAM(s) Oral daily  sodium chloride 0.9% lock flush 3 milliLiter(s) IV Push every 8 hours  tamsulosin 0.8 milliGRAM(s) Oral at bedtime    MEDICATIONS  (PRN):  acetaminophen     Tablet .. 650 milliGRAM(s) Oral every 6 hours PRN Temp greater or equal to 38C (100.4F), Mild Pain (1 - 3)  aluminum hydroxide/magnesium hydroxide/simethicone Suspension 30 milliLiter(s) Oral every 4 hours PRN Dyspepsia  benzonatate 100 milliGRAM(s) Oral three times a day PRN Cough  melatonin 3 milliGRAM(s) Oral at bedtime PRN Insomnia  ondansetron Injectable 4 milliGRAM(s) IV Push every 8 hours PRN Nausea and/or Vomiting      PHYSICAL EXAM:    Vital Signs Last 24 Hrs  T(C): 36.8 (04 Dec 2023 08:00), Max: 37.5 (03 Dec 2023 20:03)  T(F): 98.3 (04 Dec 2023 08:00), Max: 99.5 (03 Dec 2023 20:03)  HR: 66 (04 Dec 2023 08:00) (62 - 83)  BP: 117/74 (04 Dec 2023 08:00) (117/74 - 136/81)  BP(mean): --  RR: 18 (04 Dec 2023 08:00) (17 - 18)  SpO2: 98% (04 Dec 2023 08:00) (94% - 98%)    Parameters below as of 04 Dec 2023 08:00  Patient On (Oxygen Delivery Method): room air      Karnofsky: 50 %  General:  M awake alert       HEENT:  NCAT      Lungs: comfortable  non labored  CV:  RR  GI:   soft NTND  MSK: moves all extremities, no contractures  Skin:  warm/dry  Neuro  no focal deficits  Psych calm, no agitation    LABS:      12-04    141  |  109<H>  |  17.2  ----------------------------<  88  4.1   |  22.0  |  1.36<H>    Ca    8.5      04 Dec 2023 06:20    TPro  5.9<L>  /  Alb  3.3  /  TBili  0.3<L>  /  DBili  x   /  AST  19  /  ALT  20  /  AlkPhos  65  12-04      Urinalysis Basic - ( 04 Dec 2023 06:20 )    Color: x / Appearance: x / SG: x / pH: x  Gluc: 88 mg/dL / Ketone: x  / Bili: x / Urobili: x   Blood: x / Protein: x / Nitrite: x   Leuk Esterase: x / RBC: x / WBC x   Sq Epi: x / Non Sq Epi: x / Bacteria: x      I&O's Summary    03 Dec 2023 07:01  -  04 Dec 2023 07:00  --------------------------------------------------------  IN: 640 mL / OUT: 3850 mL / NET: -3210 mL        RADIOLOGY & ADDITIONAL STUDIES:  Imaging Reviewed  ( x  )   < from: Xray Chest 1 View- PORTABLE-Urgent (Xray Chest 1 View- PORTABLE-Urgent .) (12.04.23 @ 09:22) >    ACC: [id number] EXAM:  XR CHEST PORTABLE URGENT 1V   ORDERED BY: MARLYN JOHNSON     PROCEDURE DATE:  12/04/2023          INTERPRETATION:  EXAM: XR CHEST URGENT    INDICATION: Admitting Dxs: N17.9 UNABLE TO URINATE Cough/Fever XXR    COMPARISON: December 1, 2023    IMPRESSION: No focal infiltrate or congestion. Heart is within normal   limits in its transthoracic diameter. Regional osseous structures   appropriate for age    --- End of Report ---    < end of copied text >        ADVANCE DIRECTIVE PREFERENCES    Full Code

## 2023-12-04 NOTE — PROGRESS NOTE ADULT - ASSESSMENT
73yr man hx advanced dementia, HTN, CKD 3, elevated PSA (5), chronic lymphedema recent viral infection  admitted with urinary retention with EMANUEL on CKD    EMANUEL on CKD  Urinary Retention  improved  s/p IV fluids  care per primary team    Dementia   Patient is followed as an outpatient Alzheimer's  Dementia program with [facility].  Patient reported at baseline ambulatory, incontinent, verbal/ aphasic. No reported hx of aspiration  FAST6E. Not hospice appropriate  Assist with care  Cont Namenda    Agitation  12.5mg PRN as outpatient  1:1 at bedside  Supportive Care- redirection    Encounter for Palliative Care  Palliative Care consulted to provide support and assist with GOC in the context of serious illness and complex decision making.   Patient is followed as an outpatient Alzheimer's  Dementia program with [facility].    Met with wife. She is considering MELANIE and looking into a HHA.    Tried to prepare her  to need more help as time goes on given the natural progression of dementia.   Wife verbalized understanding since she experienced it with her father who had dementia.     73yr man hx advanced dementia, HTN, CKD 3, elevated PSA (5), chronic lymphedema recent viral infection  admitted with urinary retention with EMANUEL on CKD    EMANUEL on CKD  Urinary Retention  improved  s/p IV fluids  care per primary team    Dementia   Patient is followed as an outpatient Alzheimer's  Dementia program with [facility].  Patient reported at baseline ambulatory, incontinent, verbal/ aphasic. No reported hx of aspiration  FAST6E. Not hospice appropriate  Assist with care  Cont Namenda    Agitation  12.5mg PRN as outpatient  1:1 at bedside  Supportive Care- redirection    Encounter for Palliative Care  Palliative Care consulted to provide support and assist with GOC in the context of serious illness and complex decision making.   Patient is followed as an outpatient Alzheimer's  Dementia program with [facility].    Met with wife. She is considering MELANIE and looking into a HHA.    Tried to prepare her  to need more help as time goes on given the natural progression of dementia.   Wife verbalized understanding since she experienced it with her father who had dementia.     73yr man hx advanced dementia, HTN, CKD 3, elevated PSA (5), chronic lymphedema recent viral infection  admitted with urinary retention with EMANUEL on CKD    EMANUEL on CKD  Urinary Retention  improved  s/p IV fluids  care per primary team    Dementia   Patient is followed as an outpatient Alzheimer's  Dementia program with [facility].  Patient reported at baseline ambulatory, incontinent, verbal/ aphasic. No reported hx of aspiration  FAST6E. Not hospice appropriate  Assist with care  Cont Namenda    Agitation  12.5mg PRN as outpatient  1:1 at bedside  Supportive Care- redirection    Encounter for Palliative Care  Palliative Care consulted to provide support and assist with GOC in the context of serious illness and complex decision making.   Patient is followed as an outpatient Alzheimer's  Dementia program with [facility].    Met with wife. She is considering MELANIE and looking into a HHA.    Tried to prepare her  to need more help as time goes on given the natural progression of dementia.   Wife verbalized understanding since she experienced it with her father who had dementia.  Wife to be given information on  Aids as an additional home support.  Wife receptive.      73yr man hx advanced dementia, HTN, CKD 3, elevated PSA (5), chronic lymphedema recent viral infection  admitted with urinary retention with EMANUEL on CKD    EMANUEL on CKD  Urinary Retention  improved  s/p IV fluids  care per primary team    Dementia   Patient is followed as an outpatient Alzheimer's  Dementia program with [facility].  Patient reported at baseline ambulatory, incontinent, verbal/ aphasic. No reported hx of aspiration  FAST6E. Not hospice appropriate  Assist with care  Cont Namenda    Agitation  12.5mg PRN as outpatient  1:1 at bedside  Supportive Care- redirection    Encounter for Palliative Care  Palliative Care consulted to provide support and assist with GOC in the context of serious illness and complex decision making.   Patient is followed as an outpatient Alzheimer's  Dementia program with [facility].    Met with wife. She is considering MELANIE and looking into a HHA.    Tried to prepare her  to need more help as time goes on given the natural progression of dementia.   Wife verbalized understanding since she experienced it with her father who had dementia.  Wife to be given information on  Aids as an additional home support.  Wife receptive.

## 2023-12-04 NOTE — PROGRESS NOTE ADULT - SUBJECTIVE AND OBJECTIVE BOX
NEPHROLOGY INTERVAL HPI/OVERNIGHT EVENTS:  comfortable  no acute distress    MEDICATIONS  (STANDING):  cyanocobalamin 1000 MICROGram(s) Oral daily  donepezil 5 milliGRAM(s) Oral <User Schedule>  ergocalciferol 15548 Unit(s) Oral every week  heparin   Injectable 5000 Unit(s) SubCutaneous every 12 hours  lisinopril 2.5 milliGRAM(s) Oral daily  memantine 10 milliGRAM(s) Oral two times a day  QUEtiapine 12.5 milliGRAM(s) Oral at bedtime  sertraline 75 milliGRAM(s) Oral daily  sodium chloride 0.9% lock flush 3 milliLiter(s) IV Push every 8 hours  tamsulosin 0.8 milliGRAM(s) Oral at bedtime    MEDICATIONS  (PRN):  acetaminophen     Tablet .. 650 milliGRAM(s) Oral every 6 hours PRN Temp greater or equal to 38C (100.4F), Mild Pain (1 - 3)  aluminum hydroxide/magnesium hydroxide/simethicone Suspension 30 milliLiter(s) Oral every 4 hours PRN Dyspepsia  benzonatate 100 milliGRAM(s) Oral three times a day PRN Cough  melatonin 3 milliGRAM(s) Oral at bedtime PRN Insomnia  ondansetron Injectable 4 milliGRAM(s) IV Push every 8 hours PRN Nausea and/or Vomiting      Allergies    No Known Allergies          Vital Signs Last 24 Hrs  T(C): 36.8 (04 Dec 2023 08:00), Max: 37.5 (03 Dec 2023 20:03)  T(F): 98.3 (04 Dec 2023 08:00), Max: 99.5 (03 Dec 2023 20:03)  HR: 66 (04 Dec 2023 08:00) (62 - 83)  BP: 117/74 (04 Dec 2023 08:00) (117/74 - 136/81)  BP(mean): --  RR: 18 (04 Dec 2023 08:00) (17 - 18)  SpO2: 98% (04 Dec 2023 08:00) (94% - 98%)    Parameters below as of 04 Dec 2023 08:00  Patient On (Oxygen Delivery Method): room air        PHYSICAL EXAM:  GENERAL: Comfortable  NECK: Supple, No JVD  NERVOUS SYSTEM:  Awake, alert; demented  CHEST/LUNG: Clear bilaterally  HEART: Regular rate and rhythm; No rub  ABDOMEN: Soft, +BS  EXTREMITIES: No dependent edema  SKIN: No rashes or lesions  : Hollins    LABS:    12-04    141  |  109<H>  |  17.2  ----------------------------<  88  4.1   |  22.0  |  1.36<H>    Ca    8.5      04 Dec 2023 06:20    TPro  5.9<L>  /  Alb  3.3  /  TBili  0.3<L>  /  DBili  x   /  AST  19  /  ALT  20  /  AlkPhos  65  12-04      Urinalysis Basic - ( 04 Dec 2023 06:20 )    Color: x / Appearance: x / SG: x / pH: x  Gluc: 88 mg/dL / Ketone: x  / Bili: x / Urobili: x   Blood: x / Protein: x / Nitrite: x   Leuk Esterase: x / RBC: x / WBC x   Sq Epi: x / Non Sq Epi: x / Bacteria: x          RADIOLOGY & ADDITIONAL TESTS:  < from: Xray Chest 1 View- PORTABLE-Urgent (Xray Chest 1 View- PORTABLE-Urgent .) (12.04.23 @ 09:22) >  ACC: [id number] EXAM:  XR CHEST PORTABLE URGENT 1V   ORDERED BY: MARLYN JOHNSON     PROCEDURE DATE:  12/04/2023          INTERPRETATION:  EXAM: XR CHEST URGENT    INDICATION: Admitting Dxs: N17.9 UNABLE TO URINATE Cough/Fever XXR    COMPARISON: December 1, 2023    IMPRESSION: No focal infiltrate or congestion. Heart is within normal   limits in its transthoracic diameter. Regional osseous structures   appropriate for age    < end of copied text >   NEPHROLOGY INTERVAL HPI/OVERNIGHT EVENTS:  comfortable  no acute distress    MEDICATIONS  (STANDING):  cyanocobalamin 1000 MICROGram(s) Oral daily  donepezil 5 milliGRAM(s) Oral <User Schedule>  ergocalciferol 66112 Unit(s) Oral every week  heparin   Injectable 5000 Unit(s) SubCutaneous every 12 hours  lisinopril 2.5 milliGRAM(s) Oral daily  memantine 10 milliGRAM(s) Oral two times a day  QUEtiapine 12.5 milliGRAM(s) Oral at bedtime  sertraline 75 milliGRAM(s) Oral daily  sodium chloride 0.9% lock flush 3 milliLiter(s) IV Push every 8 hours  tamsulosin 0.8 milliGRAM(s) Oral at bedtime    MEDICATIONS  (PRN):  acetaminophen     Tablet .. 650 milliGRAM(s) Oral every 6 hours PRN Temp greater or equal to 38C (100.4F), Mild Pain (1 - 3)  aluminum hydroxide/magnesium hydroxide/simethicone Suspension 30 milliLiter(s) Oral every 4 hours PRN Dyspepsia  benzonatate 100 milliGRAM(s) Oral three times a day PRN Cough  melatonin 3 milliGRAM(s) Oral at bedtime PRN Insomnia  ondansetron Injectable 4 milliGRAM(s) IV Push every 8 hours PRN Nausea and/or Vomiting      Allergies    No Known Allergies          Vital Signs Last 24 Hrs  T(C): 36.8 (04 Dec 2023 08:00), Max: 37.5 (03 Dec 2023 20:03)  T(F): 98.3 (04 Dec 2023 08:00), Max: 99.5 (03 Dec 2023 20:03)  HR: 66 (04 Dec 2023 08:00) (62 - 83)  BP: 117/74 (04 Dec 2023 08:00) (117/74 - 136/81)  BP(mean): --  RR: 18 (04 Dec 2023 08:00) (17 - 18)  SpO2: 98% (04 Dec 2023 08:00) (94% - 98%)    Parameters below as of 04 Dec 2023 08:00  Patient On (Oxygen Delivery Method): room air        PHYSICAL EXAM:  GENERAL: Comfortable  NECK: Supple, No JVD  NERVOUS SYSTEM:  Awake, alert; demented  CHEST/LUNG: Clear bilaterally  HEART: Regular rate and rhythm; No rub  ABDOMEN: Soft, +BS  EXTREMITIES: No dependent edema  SKIN: No rashes or lesions  : Hollins    LABS:    12-04    141  |  109<H>  |  17.2  ----------------------------<  88  4.1   |  22.0  |  1.36<H>    Ca    8.5      04 Dec 2023 06:20    TPro  5.9<L>  /  Alb  3.3  /  TBili  0.3<L>  /  DBili  x   /  AST  19  /  ALT  20  /  AlkPhos  65  12-04      Urinalysis Basic - ( 04 Dec 2023 06:20 )    Color: x / Appearance: x / SG: x / pH: x  Gluc: 88 mg/dL / Ketone: x  / Bili: x / Urobili: x   Blood: x / Protein: x / Nitrite: x   Leuk Esterase: x / RBC: x / WBC x   Sq Epi: x / Non Sq Epi: x / Bacteria: x          RADIOLOGY & ADDITIONAL TESTS:  < from: Xray Chest 1 View- PORTABLE-Urgent (Xray Chest 1 View- PORTABLE-Urgent .) (12.04.23 @ 09:22) >  ACC: [id number] EXAM:  XR CHEST PORTABLE URGENT 1V   ORDERED BY: MARLYN JOHNSON     PROCEDURE DATE:  12/04/2023          INTERPRETATION:  EXAM: XR CHEST URGENT    INDICATION: Admitting Dxs: N17.9 UNABLE TO URINATE Cough/Fever XXR    COMPARISON: December 1, 2023    IMPRESSION: No focal infiltrate or congestion. Heart is within normal   limits in its transthoracic diameter. Regional osseous structures   appropriate for age    < end of copied text >

## 2023-12-04 NOTE — PROGRESS NOTE ADULT - ASSESSMENT
CKD(III): baseline Screat 1.5mg/dL  EMANUEL r/o urinary retention, +/- vol depletion---> resolved  - avoid potential nephrotoxins  - monitor off IVF  - trend labs

## 2023-12-05 ENCOUNTER — TRANSCRIPTION ENCOUNTER (OUTPATIENT)
Age: 73
End: 2023-12-05

## 2023-12-05 VITALS
HEART RATE: 84 BPM | OXYGEN SATURATION: 94 % | SYSTOLIC BLOOD PRESSURE: 116 MMHG | RESPIRATION RATE: 18 BRPM | DIASTOLIC BLOOD PRESSURE: 68 MMHG | TEMPERATURE: 98 F

## 2023-12-05 LAB
ANION GAP SERPL CALC-SCNC: 12 MMOL/L — SIGNIFICANT CHANGE UP (ref 5–17)
ANION GAP SERPL CALC-SCNC: 12 MMOL/L — SIGNIFICANT CHANGE UP (ref 5–17)
BUN SERPL-MCNC: 19.3 MG/DL — SIGNIFICANT CHANGE UP (ref 8–20)
BUN SERPL-MCNC: 19.3 MG/DL — SIGNIFICANT CHANGE UP (ref 8–20)
CALCIUM SERPL-MCNC: 8.3 MG/DL — LOW (ref 8.4–10.5)
CALCIUM SERPL-MCNC: 8.3 MG/DL — LOW (ref 8.4–10.5)
CHLORIDE SERPL-SCNC: 105 MMOL/L — SIGNIFICANT CHANGE UP (ref 96–108)
CHLORIDE SERPL-SCNC: 105 MMOL/L — SIGNIFICANT CHANGE UP (ref 96–108)
CO2 SERPL-SCNC: 23 MMOL/L — SIGNIFICANT CHANGE UP (ref 22–29)
CO2 SERPL-SCNC: 23 MMOL/L — SIGNIFICANT CHANGE UP (ref 22–29)
CREAT SERPL-MCNC: 1.39 MG/DL — HIGH (ref 0.5–1.3)
CREAT SERPL-MCNC: 1.39 MG/DL — HIGH (ref 0.5–1.3)
EGFR: 54 ML/MIN/1.73M2 — LOW
EGFR: 54 ML/MIN/1.73M2 — LOW
GLUCOSE SERPL-MCNC: 95 MG/DL — SIGNIFICANT CHANGE UP (ref 70–99)
GLUCOSE SERPL-MCNC: 95 MG/DL — SIGNIFICANT CHANGE UP (ref 70–99)
HCT VFR BLD CALC: 33.5 % — LOW (ref 39–50)
HCT VFR BLD CALC: 33.5 % — LOW (ref 39–50)
HGB BLD-MCNC: 11.6 G/DL — LOW (ref 13–17)
HGB BLD-MCNC: 11.6 G/DL — LOW (ref 13–17)
MCHC RBC-ENTMCNC: 32.4 PG — SIGNIFICANT CHANGE UP (ref 27–34)
MCHC RBC-ENTMCNC: 32.4 PG — SIGNIFICANT CHANGE UP (ref 27–34)
MCHC RBC-ENTMCNC: 34.6 GM/DL — SIGNIFICANT CHANGE UP (ref 32–36)
MCHC RBC-ENTMCNC: 34.6 GM/DL — SIGNIFICANT CHANGE UP (ref 32–36)
MCV RBC AUTO: 93.6 FL — SIGNIFICANT CHANGE UP (ref 80–100)
MCV RBC AUTO: 93.6 FL — SIGNIFICANT CHANGE UP (ref 80–100)
PLATELET # BLD AUTO: 318 K/UL — SIGNIFICANT CHANGE UP (ref 150–400)
PLATELET # BLD AUTO: 318 K/UL — SIGNIFICANT CHANGE UP (ref 150–400)
POTASSIUM SERPL-MCNC: 4.3 MMOL/L — SIGNIFICANT CHANGE UP (ref 3.5–5.3)
POTASSIUM SERPL-MCNC: 4.3 MMOL/L — SIGNIFICANT CHANGE UP (ref 3.5–5.3)
POTASSIUM SERPL-SCNC: 4.3 MMOL/L — SIGNIFICANT CHANGE UP (ref 3.5–5.3)
POTASSIUM SERPL-SCNC: 4.3 MMOL/L — SIGNIFICANT CHANGE UP (ref 3.5–5.3)
PSA FLD-MCNC: 5.13 NG/ML — HIGH (ref 0–4)
PSA FLD-MCNC: 5.13 NG/ML — HIGH (ref 0–4)
RBC # BLD: 3.58 M/UL — LOW (ref 4.2–5.8)
RBC # BLD: 3.58 M/UL — LOW (ref 4.2–5.8)
RBC # FLD: 11.6 % — SIGNIFICANT CHANGE UP (ref 10.3–14.5)
RBC # FLD: 11.6 % — SIGNIFICANT CHANGE UP (ref 10.3–14.5)
SODIUM SERPL-SCNC: 140 MMOL/L — SIGNIFICANT CHANGE UP (ref 135–145)
SODIUM SERPL-SCNC: 140 MMOL/L — SIGNIFICANT CHANGE UP (ref 135–145)
WBC # BLD: 9.25 K/UL — SIGNIFICANT CHANGE UP (ref 3.8–10.5)
WBC # BLD: 9.25 K/UL — SIGNIFICANT CHANGE UP (ref 3.8–10.5)
WBC # FLD AUTO: 9.25 K/UL — SIGNIFICANT CHANGE UP (ref 3.8–10.5)
WBC # FLD AUTO: 9.25 K/UL — SIGNIFICANT CHANGE UP (ref 3.8–10.5)

## 2023-12-05 PROCEDURE — 99223 1ST HOSP IP/OBS HIGH 75: CPT

## 2023-12-05 PROCEDURE — 99239 HOSP IP/OBS DSCHRG MGMT >30: CPT

## 2023-12-05 PROCEDURE — 99232 SBSQ HOSP IP/OBS MODERATE 35: CPT

## 2023-12-05 RX ORDER — ERGOCALCIFEROL 1.25 MG/1
1 CAPSULE ORAL
Qty: 4 | Refills: 0
Start: 2023-12-05 | End: 2024-01-03

## 2023-12-05 RX ORDER — TAMSULOSIN HYDROCHLORIDE 0.4 MG/1
2 CAPSULE ORAL
Refills: 0 | DISCHARGE

## 2023-12-05 RX ORDER — PREGABALIN 225 MG/1
1 CAPSULE ORAL
Qty: 30 | Refills: 0
Start: 2023-12-05 | End: 2024-01-03

## 2023-12-05 RX ORDER — TAMSULOSIN HYDROCHLORIDE 0.4 MG/1
2 CAPSULE ORAL
Qty: 0 | Refills: 0 | DISCHARGE
Start: 2023-12-05

## 2023-12-05 RX ORDER — AMLODIPINE BESYLATE 2.5 MG/1
1 TABLET ORAL
Qty: 30 | Refills: 0
Start: 2023-12-05 | End: 2024-01-03

## 2023-12-05 RX ORDER — LANOLIN ALCOHOL/MO/W.PET/CERES
1 CREAM (GRAM) TOPICAL
Qty: 0 | Refills: 0 | DISCHARGE
Start: 2023-12-05

## 2023-12-05 RX ORDER — ERGOCALCIFEROL 1.25 MG/1
1 CAPSULE ORAL
Qty: 30 | Refills: 0
Start: 2023-12-05 | End: 2024-01-03

## 2023-12-05 RX ORDER — LISINOPRIL 2.5 MG/1
1 TABLET ORAL
Refills: 0 | DISCHARGE

## 2023-12-05 RX ORDER — LISINOPRIL 2.5 MG/1
1 TABLET ORAL
Qty: 30 | Refills: 0
Start: 2023-12-05 | End: 2024-01-03

## 2023-12-05 RX ADMIN — SODIUM CHLORIDE 3 MILLILITER(S): 9 INJECTION INTRAMUSCULAR; INTRAVENOUS; SUBCUTANEOUS at 05:36

## 2023-12-05 RX ADMIN — SERTRALINE 75 MILLIGRAM(S): 25 TABLET, FILM COATED ORAL at 09:05

## 2023-12-05 RX ADMIN — MEMANTINE HYDROCHLORIDE 10 MILLIGRAM(S): 10 TABLET ORAL at 05:37

## 2023-12-05 RX ADMIN — Medication 100 MILLIGRAM(S): at 09:05

## 2023-12-05 RX ADMIN — PREGABALIN 1000 MICROGRAM(S): 225 CAPSULE ORAL at 09:05

## 2023-12-05 RX ADMIN — DONEPEZIL HYDROCHLORIDE 5 MILLIGRAM(S): 10 TABLET, FILM COATED ORAL at 09:05

## 2023-12-05 RX ADMIN — LISINOPRIL 2.5 MILLIGRAM(S): 2.5 TABLET ORAL at 05:37

## 2023-12-05 RX ADMIN — HEPARIN SODIUM 5000 UNIT(S): 5000 INJECTION INTRAVENOUS; SUBCUTANEOUS at 05:36

## 2023-12-05 NOTE — PROGRESS NOTE ADULT - TIME BILLING
Time spent with review of chart documents, labs, imaging. Direct patient assessment,  formulation of care plan. Discussion with  Interdisciplinary  team  VENITA MCGOWAN

## 2023-12-05 NOTE — BH CONSULTATION LIAISON ASSESSMENT NOTE - HPI (INCLUDE ILLNESS QUALITY, SEVERITY, DURATION, TIMING, CONTEXT, MODIFYING FACTORS, ASSOCIATED SIGNS AND SYMPTOMS)
Patient is a 73 year old  male who is a retired , living with his wife, with no past psychiatric or substance abuse history and with a PMH of hx advanced alzheimer's dementia, HTN, CKD 3, elevated PSA (5), chronic lymphedema recent viral infection  admitted with urinary retention with EMANUEL on CKD. psychiatry consulted for med management of agitation.     patient seen at bedside and found to be calm, cooperative but confused while oriented x1. Patient pleasant but unable to participate in full eval.   Wife at bedside who gave majority of info and explains how patient was diagnosed with Alzheimer's. at baseline, patient oriented to person and sometimes place. n denies any acute agitation but does have periods of anxiety/restlessness which he was started on zoloft and seroquel for which have been effective.

## 2023-12-05 NOTE — PROGRESS NOTE ADULT - PROVIDER SPECIALTY LIST ADULT
Hospitalist
Hospitalist
Nephrology
Nephrology
Palliative Care
Urology
Hospitalist
Nephrology
Nephrology
Hospitalist
Palliative Care

## 2023-12-05 NOTE — PROGRESS NOTE ADULT - SUBJECTIVE AND OBJECTIVE BOX
CC:  Follow up GOC , Symptoms    OVERNIGHT EVENTS:   no reported events  wife has decided d/c to home    Present Symptoms:   Dyspnea:  No    Nausea/Vomiting:  No   Anxiety:  No     Depression:  No   Fatigue:  Yes     Loss of appetite:  No   Constipation: No    Pain: No               Location            Duration            Character            Severity            Factors            Effect    Pain AD Score:  [email or web address] (press ctrl + left click to view)    Review of Systems: Reviewed as above  All others negative    MEDICATIONS  (STANDING):  cyanocobalamin 1000 MICROGram(s) Oral daily  donepezil 5 milliGRAM(s) Oral <User Schedule>  ergocalciferol 99687 Unit(s) Oral every week  heparin   Injectable 5000 Unit(s) SubCutaneous every 12 hours  lisinopril 2.5 milliGRAM(s) Oral daily  memantine 10 milliGRAM(s) Oral two times a day  QUEtiapine 12.5 milliGRAM(s) Oral at bedtime  sertraline 75 milliGRAM(s) Oral daily  sodium chloride 0.9% lock flush 3 milliLiter(s) IV Push every 8 hours  tamsulosin 0.8 milliGRAM(s) Oral at bedtime    MEDICATIONS  (PRN):  acetaminophen     Tablet .. 650 milliGRAM(s) Oral every 6 hours PRN Temp greater or equal to 38C (100.4F), Mild Pain (1 - 3)  aluminum hydroxide/magnesium hydroxide/simethicone Suspension 30 milliLiter(s) Oral every 4 hours PRN Dyspepsia  benzonatate 100 milliGRAM(s) Oral three times a day PRN Cough  melatonin 3 milliGRAM(s) Oral at bedtime PRN Insomnia  ondansetron Injectable 4 milliGRAM(s) IV Push every 8 hours PRN Nausea and/or Vomiting  QUEtiapine 12.5 milliGRAM(s) Oral two times a day PRN agitation      PHYSICAL EXAM:    Vital Signs Last 24 Hrs  T(C): 37.2 (05 Dec 2023 07:51), Max: 37.2 (05 Dec 2023 07:51)  T(F): 98.9 (05 Dec 2023 07:51), Max: 98.9 (05 Dec 2023 07:51)  HR: 81 (05 Dec 2023 07:51) (76 - 98)  BP: 116/70 (05 Dec 2023 07:51) (116/70 - 145/91)  BP(mean): --  RR: 18 (05 Dec 2023 07:51) (16 - 18)  SpO2: 93% (05 Dec 2023 07:51) (92% - 95%)    Parameters below as of 05 Dec 2023 07:51  Patient On (Oxygen Delivery Method): room air      Karnofsky: 50 %  General:  M awake alert NAD       HEENT:  NCAT        Lungs: comfortable  non labored  CV:  RR  GI: soft NTND  Skin:  warm/dry  Neuro  no focal deficits  Psych calm    LABS:                          11.6   9.25  )-----------( 318      ( 05 Dec 2023 05:50 )             33.5     12-05    140  |  105  |  19.3  ----------------------------<  95  4.3   |  23.0  |  1.39<H>    Ca    8.3<L>      05 Dec 2023 05:50    TPro  5.9<L>  /  Alb  3.3  /  TBili  0.3<L>  /  DBili  x   /  AST  19  /  ALT  20  /  AlkPhos  65  12-04      Urinalysis Basic - ( 05 Dec 2023 05:50 )    Color: x / Appearance: x / SG: x / pH: x  Gluc: 95 mg/dL / Ketone: x  / Bili: x / Urobili: x   Blood: x / Protein: x / Nitrite: x   Leuk Esterase: x / RBC: x / WBC x   Sq Epi: x / Non Sq Epi: x / Bacteria: x      I&O's Summary    04 Dec 2023 07:01  -  05 Dec 2023 07:00  --------------------------------------------------------  IN: 360 mL / OUT: 1630 mL / NET: -1270 mL    05 Dec 2023 07:01  -  05 Dec 2023 12:16  --------------------------------------------------------  IN: 240 mL / OUT: 0 mL / NET: 240 mL        ADVANCE DIRECTIVE PREFERENCES    Full Code   CC:  Follow up GOC , Symptoms    OVERNIGHT EVENTS:   no reported events  wife has decided d/c to home    Present Symptoms:   Dyspnea:  No    Nausea/Vomiting:  No   Anxiety:  No     Depression:  No   Fatigue:  Yes     Loss of appetite:  No   Constipation: No    Pain: No               Location            Duration            Character            Severity            Factors            Effect    Pain AD Score:  [email or web address] (press ctrl + left click to view)    Review of Systems: Reviewed as above  All others negative    MEDICATIONS  (STANDING):  cyanocobalamin 1000 MICROGram(s) Oral daily  donepezil 5 milliGRAM(s) Oral <User Schedule>  ergocalciferol 93176 Unit(s) Oral every week  heparin   Injectable 5000 Unit(s) SubCutaneous every 12 hours  lisinopril 2.5 milliGRAM(s) Oral daily  memantine 10 milliGRAM(s) Oral two times a day  QUEtiapine 12.5 milliGRAM(s) Oral at bedtime  sertraline 75 milliGRAM(s) Oral daily  sodium chloride 0.9% lock flush 3 milliLiter(s) IV Push every 8 hours  tamsulosin 0.8 milliGRAM(s) Oral at bedtime    MEDICATIONS  (PRN):  acetaminophen     Tablet .. 650 milliGRAM(s) Oral every 6 hours PRN Temp greater or equal to 38C (100.4F), Mild Pain (1 - 3)  aluminum hydroxide/magnesium hydroxide/simethicone Suspension 30 milliLiter(s) Oral every 4 hours PRN Dyspepsia  benzonatate 100 milliGRAM(s) Oral three times a day PRN Cough  melatonin 3 milliGRAM(s) Oral at bedtime PRN Insomnia  ondansetron Injectable 4 milliGRAM(s) IV Push every 8 hours PRN Nausea and/or Vomiting  QUEtiapine 12.5 milliGRAM(s) Oral two times a day PRN agitation      PHYSICAL EXAM:    Vital Signs Last 24 Hrs  T(C): 37.2 (05 Dec 2023 07:51), Max: 37.2 (05 Dec 2023 07:51)  T(F): 98.9 (05 Dec 2023 07:51), Max: 98.9 (05 Dec 2023 07:51)  HR: 81 (05 Dec 2023 07:51) (76 - 98)  BP: 116/70 (05 Dec 2023 07:51) (116/70 - 145/91)  BP(mean): --  RR: 18 (05 Dec 2023 07:51) (16 - 18)  SpO2: 93% (05 Dec 2023 07:51) (92% - 95%)    Parameters below as of 05 Dec 2023 07:51  Patient On (Oxygen Delivery Method): room air      Karnofsky: 50 %  General:  M awake alert NAD       HEENT:  NCAT        Lungs: comfortable  non labored  CV:  RR  GI: soft NTND  Skin:  warm/dry  Neuro  no focal deficits  Psych calm    LABS:                          11.6   9.25  )-----------( 318      ( 05 Dec 2023 05:50 )             33.5     12-05    140  |  105  |  19.3  ----------------------------<  95  4.3   |  23.0  |  1.39<H>    Ca    8.3<L>      05 Dec 2023 05:50    TPro  5.9<L>  /  Alb  3.3  /  TBili  0.3<L>  /  DBili  x   /  AST  19  /  ALT  20  /  AlkPhos  65  12-04      Urinalysis Basic - ( 05 Dec 2023 05:50 )    Color: x / Appearance: x / SG: x / pH: x  Gluc: 95 mg/dL / Ketone: x  / Bili: x / Urobili: x   Blood: x / Protein: x / Nitrite: x   Leuk Esterase: x / RBC: x / WBC x   Sq Epi: x / Non Sq Epi: x / Bacteria: x      I&O's Summary    04 Dec 2023 07:01  -  05 Dec 2023 07:00  --------------------------------------------------------  IN: 360 mL / OUT: 1630 mL / NET: -1270 mL    05 Dec 2023 07:01  -  05 Dec 2023 12:16  --------------------------------------------------------  IN: 240 mL / OUT: 0 mL / NET: 240 mL        ADVANCE DIRECTIVE PREFERENCES    Full Code

## 2023-12-05 NOTE — DISCHARGE NOTE PROVIDER - NSDCMRMEDTOKEN_GEN_ALL_CORE_FT
Aricept 5 mg oral tablet: 1 tab(s) orally 2 times a day  aspirin 81 mg oral tablet: 1 tab(s) orally once a day  benzonatate 100 mg oral capsule: 1 cap(s) orally 3 times a day as needed for  cough  cyanocobalamin 1000 mcg oral tablet: 1 tab(s) orally once a day  ergocalciferol 1.25 mg (50,000 intl units) oral capsule: 1 cap(s) orally  lisinopril 2.5 mg oral tablet: 1 tab(s) orally once a day  lisinopril 40 mg oral tablet: 1 tab(s) orally once a day  melatonin 3 mg oral tablet: 1 tab(s) orally once a day (at bedtime) As needed Insomnia  memantine 10 mg oral tablet: 1 tab(s) orally 2 times a day  multi-vitamin: 1 tab(s) orally once a day  Seroquel 25 mg oral tablet: 0.5 tab(s) orally once a day (at bedtime)  sertraline 50 mg oral tablet: 1.5 tab(s) orally once a day  tamsulosin 0.4 mg oral capsule: 2 cap(s) orally once a day (at bedtime)  tamsulosin 0.4 mg oral capsule: 2 cap(s) orally once a day (at bedtime)  Zetia 10 mg oral tablet: 1 tab(s) orally once a day (at bedtime)

## 2023-12-05 NOTE — PROGRESS NOTE ADULT - REASON FOR ADMISSION
EMANUEL  Urinary retention

## 2023-12-05 NOTE — DISCHARGE NOTE PROVIDER - NSDCQMSTROKE_NEU_ALL_CORE
Patient: Sherie Sabillon    Procedure: Procedure(s):  LEFT KNEE ARTHROSCOPIC LATERAL MENISCUS ALLOGRAFT TRANSPLANT  OPEN DISTAL FEMORAL OSTEOTOMY       Anesthesia Type:  General    Note:  Disposition: Outpatient   Postop Pain Control: Uneventful            Sign Out: Well controlled pain   PONV: No   Neuro/Psych: Uneventful            Sign Out: Acceptable/Baseline neuro status   Airway/Respiratory: Uneventful            Sign Out: Acceptable/Baseline resp. status   CV/Hemodynamics: Uneventful            Sign Out: Acceptable CV status   Other NRE: NONE   DID A NON-ROUTINE EVENT OCCUR? No    Event details/Postop Comments:  Doing well. Please call with questions/concerns.           Last vitals:  Vitals Value Taken Time   /63 04/25/22 1915   Temp 36.3  C (97.4  F) 04/25/22 1900   Pulse 55 04/25/22 1921   Resp 11 04/25/22 1921   SpO2 97 % 04/25/22 1921   Vitals shown include unvalidated device data.    Electronically Signed By: Darrian Mullins MD  April 25, 2022  7:22 PM   No

## 2023-12-05 NOTE — BH CONSULTATION LIAISON ASSESSMENT NOTE - CURRENT MEDICATION
MEDICATIONS  (STANDING):  cyanocobalamin 1000 MICROGram(s) Oral daily  donepezil 5 milliGRAM(s) Oral <User Schedule>  ergocalciferol 27332 Unit(s) Oral every week  heparin   Injectable 5000 Unit(s) SubCutaneous every 12 hours  lisinopril 2.5 milliGRAM(s) Oral daily  memantine 10 milliGRAM(s) Oral two times a day  QUEtiapine 12.5 milliGRAM(s) Oral at bedtime  sertraline 75 milliGRAM(s) Oral daily  sodium chloride 0.9% lock flush 3 milliLiter(s) IV Push every 8 hours  tamsulosin 0.8 milliGRAM(s) Oral at bedtime    MEDICATIONS  (PRN):  acetaminophen     Tablet .. 650 milliGRAM(s) Oral every 6 hours PRN Temp greater or equal to 38C (100.4F), Mild Pain (1 - 3)  aluminum hydroxide/magnesium hydroxide/simethicone Suspension 30 milliLiter(s) Oral every 4 hours PRN Dyspepsia  benzonatate 100 milliGRAM(s) Oral three times a day PRN Cough  melatonin 3 milliGRAM(s) Oral at bedtime PRN Insomnia  ondansetron Injectable 4 milliGRAM(s) IV Push every 8 hours PRN Nausea and/or Vomiting  QUEtiapine 12.5 milliGRAM(s) Oral two times a day PRN agitation   MEDICATIONS  (STANDING):  cyanocobalamin 1000 MICROGram(s) Oral daily  donepezil 5 milliGRAM(s) Oral <User Schedule>  ergocalciferol 46222 Unit(s) Oral every week  heparin   Injectable 5000 Unit(s) SubCutaneous every 12 hours  lisinopril 2.5 milliGRAM(s) Oral daily  memantine 10 milliGRAM(s) Oral two times a day  QUEtiapine 12.5 milliGRAM(s) Oral at bedtime  sertraline 75 milliGRAM(s) Oral daily  sodium chloride 0.9% lock flush 3 milliLiter(s) IV Push every 8 hours  tamsulosin 0.8 milliGRAM(s) Oral at bedtime    MEDICATIONS  (PRN):  acetaminophen     Tablet .. 650 milliGRAM(s) Oral every 6 hours PRN Temp greater or equal to 38C (100.4F), Mild Pain (1 - 3)  aluminum hydroxide/magnesium hydroxide/simethicone Suspension 30 milliLiter(s) Oral every 4 hours PRN Dyspepsia  benzonatate 100 milliGRAM(s) Oral three times a day PRN Cough  melatonin 3 milliGRAM(s) Oral at bedtime PRN Insomnia  ondansetron Injectable 4 milliGRAM(s) IV Push every 8 hours PRN Nausea and/or Vomiting  QUEtiapine 12.5 milliGRAM(s) Oral two times a day PRN agitation

## 2023-12-05 NOTE — DISCHARGE NOTE PROVIDER - NSDCFUSCHEDAPPT_GEN_ALL_CORE_FT
[facility] Physician [facility]  GERIATRICS [address] [place] [address]   Scheduled Appointment: 01/16/2024     [facility] Physician [facility]  GERIATRICS [address] [place] [address]   Scheduled Appointment: 01/16/2024

## 2023-12-05 NOTE — DISCHARGE NOTE PROVIDER - PROVIDER TOKENS
PROVIDER:[TOKEN:[[id number]],FOLLOWUP:[2 weeks]] PROVIDER:[TOKEN:[[id number]],FOLLOWUP:[2 weeks]]
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
## 2023-12-05 NOTE — DISCHARGE NOTE PROVIDER - NSDCCPCAREPLAN_GEN_ALL_CORE_FT
PRINCIPAL DISCHARGE DIAGNOSIS  Diagnosis: EMANUEL (acute kidney injury)  Assessment and Plan of Treatment: on cronic kidney disease   Cr is improved stable , lisnopril stopped         SECONDARY DISCHARGE DIAGNOSES  Diagnosis: Obstructive uropathy  Assessment and Plan of Treatment: enlarged prostate    Diagnosis: Hypertension  Assessment and Plan of Treatment: monitor blood pressure at home   started amlodipine , stop lisnopril    Diagnosis: Dementia  Assessment and Plan of Treatment:

## 2023-12-05 NOTE — BH CONSULTATION LIAISON ASSESSMENT NOTE - SUMMARY
Patient is a 73 year old  male who is a retired , living with his wife, with no past psychiatric or substance abuse history and with a PMH of hx advanced alzheimer's dementia, HTN, CKD 3, elevated PSA (5), chronic lymphedema recent viral infection  admitted with urinary retention with EMANUEL on CKD. psychiatry consulted for med management of agitation.     Patient seen and found to be calm, cooperative and pleasant with no acute agitation   Agree with current medications. recommend to continue     Recs   -Maintain delirium precautions   -Avoid anticholinergic agents, benzos, opioid as they can further perpetuate confusion   -Frequent re orientation, Hydration, try to avoid restraints and if possible, mobilize patient and PT involvement

## 2023-12-05 NOTE — BH CONSULTATION LIAISON ASSESSMENT NOTE - NSBHCHARTREVIEWVS_PSY_A_CORE FT
Vital Signs Last 24 Hrs  T(C): 37.2 (05 Dec 2023 07:51), Max: 37.2 (05 Dec 2023 07:51)  T(F): 98.9 (05 Dec 2023 07:51), Max: 98.9 (05 Dec 2023 07:51)  HR: 81 (05 Dec 2023 07:51) (76 - 98)  BP: 116/70 (05 Dec 2023 07:51) (116/70 - 145/91)  BP(mean): --  RR: 18 (05 Dec 2023 07:51) (16 - 18)  SpO2: 93% (05 Dec 2023 07:51) (92% - 95%)    Parameters below as of 05 Dec 2023 07:51  Patient On (Oxygen Delivery Method): room air

## 2023-12-05 NOTE — PROGRESS NOTE ADULT - ASSESSMENT
EMANUEL r/o urinary retention, +/- vol depletion---> resolved to baseline (CKD III)  - cont to avoid potential nephrotoxins  - monitor off IVF and post toscano removal  - trend labs on ACE

## 2023-12-05 NOTE — PROGRESS NOTE ADULT - ASSESSMENT
73yr man hx advanced dementia, HTN, CKD 3, elevated PSA (5), chronic lymphedema recent viral infection  admitted with urinary retention with EMANUEL on CKD    EMANUEL on CKD  Urinary Retention  f/u urology  s/p IV fluids  care per primary team    Dementia   Patient is followed as an outpatient Alzheimer's  Dementia program with [facility].  Patient reported at baseline ambulatory, incontinent, verbal/ aphasic. No reported hx of aspiration  FAST6E. Not hospice appropriate  Assist with care  Cont Namenda    Agitation  Seroquel 12.5mg PRN   Supportive Care- redirection    Advance Care Planning  Broached advance directives CPR, intubation.  Wife states they have an elder  and have all paperwork in place.   Continued ACP discussions as outpatient    Encounter for Palliative Care  Wife has decided d/c to home rather than [facility].  Will be looking into  aides as a supplement to home health aides.   Patient to follow up with outpatient Alzheimer's Dementia program.   Psychosocial support.          73yr man hx advanced dementia, HTN, CKD 3, elevated PSA (5), chronic lymphedema recent viral infection  admitted with urinary retention with EMANUEL on CKD    EMANUEL on CKD  Urinary Retention  f/u urology  s/p IV fluids  care per primary team    Dementia   Patient is followed as an outpatient Alzheimer's  Dementia program with [facility].  Patient reported at baseline ambulatory, incontinent, verbal/ aphasic. No reported hx of aspiration  FAST6E. Not hospice appropriate  Assist with care  Cont Namenda    Agitation  Seroquel 12.5mg PRN   Supportive Care- redirection    Advance Care Planning  Broached advance directives CPR, intubation.  Wife states they have an elder  and have all paperwork in place.   Continued ACP discussions as outpatient    Encounter for Palliative Care  Wife has decided d/c to home rather than [facility].  Will be looking into  aides as a supplement to home health aides.   Patient to follow up with outpatient Alzheimer's Dementia program.   Psychosocial support.

## 2023-12-05 NOTE — DISCHARGE NOTE NURSING/CASE MANAGEMENT/SOCIAL WORK - PATIENT PORTAL LINK FT
You can access the FollowMyHealth Patient Portal offered by [facility] by registering at the following website: [email or web address]. By joining [employer]’s FollowMyHealth portal, you will also be able to view your health information using other applications (apps) compatible with our system. You can access the FollowMyHealth Patient Portal offered by [facility] by registering at the following website: [email or web address]. By joining [employer]’s FollowMyHealth portal, you will also be able to view your health information using other applications (apps) compatible with our system.

## 2023-12-05 NOTE — DISCHARGE NOTE PROVIDER - HOSPITAL COURSE
74 y/o male with hx of Alzheimer dementia, HTN, CKD-3 with baseline Cr. of 1.5, PH with known elevated PSA of 5, chronic LE lymphedema. Patient presented to ED w/ Wife c/o difficulty urinating for past 24 hours. History obtained from Wife based on Patient being a poor historian due to Dementia. Patient saw PMD on 11/29 c/o URI symptoms as well as loose stool since 11/24. No reports of sick contacts, travel, or medication changes. Wife reports Patient normally is independent but does at time fall with no reported injuries. Patient was given Zithromax-took 1 dose, Tessalon pearls and told to hydrate. He was called later in the day and notified he was enterovirus pos. and told to stop Zithromax and cont. Tessalon pearls prn. Wife denies any sputum production, blood/mucus in loose stool. No reports of rash, focal weakness, HA, or CP. In the ED Initial vitals stable, c/o inability to urinate and bladder scan with around 300ml. Toscano placed w/o incident and clear urine noted in bag. Wife notes this happened about a year ago when they were vacationing in [place] and he was seen in a Hospital there and dcd with a toscano and leg bag and has been following with a private Urologist who did a TOV and put him on Flomax and has been following him for the elevated PSA as well. Noted to have a renal U/S in 7/23 due to elevated Cr. which noted a left atrophic kidney w/o hemodynamically sig. stenosis. Patient noted to have elevated Cr. of 2.1 on 11/27 and was supposed to see Nephrology in near future. Per Wife he has been on the same BP meds and due to being ill has not been drinking and eating as much as normal over the last week. Cr. here 1.9 with UA showing elevated SG otherwise without evidence of infection. WBC 11.3 without left shift. CXR with poor insp. effort w/o overt infiltrate. No other complaints noted by Patient/Wife during admission   Pt is started on IVF ,. lisinopril held , 1:1 is in place , weak PT consulted   daily labs CR and lytes being monitored, also had toscano inserted on admission for retention   PT is improving clinically , PT evaluated pt ambulates with Walker , intermittent cough CXR repeated no infiltrate   Pt's toscano  removed voiding , renal son medical dx no hydro or obstruction   Pt 's BP is normal range low dose bp started amlodipine , lisnopril stopped   d/w nephrology      74 y/o male with hx of Alzheimer dementia, HTN, CKD-3 with baseline Cr. of 1.5, PH with known elevated PSA of 5, chronic LE lymphedema. Patient presented to ED w/ Wife c/o difficulty urinating for past 24 hours. History obtained from Wife based on Patient being a poor historian due to Dementia. Patient saw PMD on 11/29 c/o URI symptoms as well as loose stool since 11/24. No reports of sick contacts, travel, or medication changes. Wife reports Patient normally is independent but does at time fall with no reported injuries. Patient was given Zithromax-took 1 dose, Tessalon pearls and told to hydrate. He was called later in the day and notified he was enterovirus pos. and told to stop Zithromax and cont. Tessalon pearls prn. Wife denies any sputum production, blood/mucus in loose stool. No reports of rash, focal weakness, HA, or CP. In the ED Initial vitals stable, c/o inability to urinate and bladder scan with around 300ml. Toscano placed w/o incident and clear urine noted in bag. Wife notes this happened about a year ago when they were vacationing in [place] and he was seen in a Hospital there and dcd with a toscano and leg bag and has been following with a private Urologist who did a TOV and put him on Flomax and has been following him for the elevated PSA as well. Noted to have a renal U/S in 7/23 due to elevated Cr. which noted a left atrophic kidney w/o hemodynamically sig. stenosis. Patient noted to have elevated Cr. of 2.1 on 11/27 and was supposed to see Nephrology in near future. Per Wife he has been on the same BP meds and due to being ill has not been drinking and eating as much as normal over the last week. Cr. here 1.9 with UA showing elevated SG otherwise without evidence of infection. WBC 11.3 without left shift. CXR with poor insp. effort w/o overt infiltrate. No other complaints noted by Patient/Wife during admission   Pt is started on IVF ,. lisinopril held , 1:1 is in place , weak PT consulted   daily labs CR and lytes being monitored, also had toscano inserted on admission for retention   PT is improving clinically , PT evaluated pt ambulates with Walker , intermittent cough CXR repeated no infiltrate   Pt's toscano  removed voiding , renal son medical dx no hydro or obstruction   Pt 's BP is normal range low dose bp started amlodipine , lisnopril stopped   d/w nephrology

## 2023-12-05 NOTE — CHART NOTE - NSCHARTNOTEFT_GEN_A_CORE
MERCEDEZ and JUAN M Bran met w/ pt's spouse at bedside (Desirae Phone# [phone]). Introduced self and function. Spouse provided verbal understanding. Pt still undergoing medical eval, CCC and JUAN M to remain avail.
Pt with urinary retention  toscano removed yesterday  pt has been voiding, PVR last night 289ml  would recommend continuing bladder scans to ensure pt is emptying his bladder  cont tamsulosin  cont care as per primary team
Palliative care social work note.     and palliative care physician Dr Mcguire met with patient and wife at bedside. patient pleasantly confused with dx of Alzheimers x 5 years. Patients wife engaged with team and reviewed involvement with [facility] physician Dr Avila as well as home visiting program whom had contacted palliative care team. Wife started to become tearful and meeting then held outside. Caregiver burnout addressed and coping with decline over last few months. Wife acknowledges awareness of disease progression as her father was dx with Alzheimers. Wife identified being overwhelmed with increased needs as well as projected losses. Wife reports that patient and her traveled despite dx 5 years ago and was still enjoying life experiences. Wife has had difficulty accepting increase help in home as she felt she could manage patient needs but has become more realistic regarding hr need for more care at home. Wife rpeorts her family and children as well as patients friends are supportive and provide her with Respite. Wife acknowledges self as patients HCP but became guarded with initiation of directives discussion. Palliative care will follow to continue to support spouse and further engage in advance care planning .
Palliative care social work note.     SW and palliative care physician Dr Mcguire met with patients wife to provide support in coping with progressive illness and address caregiver burnout. Wife wanting to proceed with rehab plan although patient ambulating around unit. Concerns with dementia and delirium with changes in setting as well as impulse concerns that can present safety concerns due to wife acknowledging recent falls. Education regarding planning with private hire as well with utilization of  agencies to maximize coverage at home. Resource information provided .
Palliative care social work note.   and palliative care physician Dr Mcguire met with patients wife to provide ongoing support in coping with patient progressive illness and engaged her in discussing alternatives to care at home. Spouse no longer wants to pursue subacute plan as she feels patients dementia will worsen with another setting change and wishes to proceed with getting him home with resumption of services and additionally supplementing private hire. The importance of Respite to prevent caregiver burnout reviewed and reinforced accessing community services for  and HHA. Advance care planning discussion initiated. Spouse guarded and states she has "everything already set up". Reinforced directives planning prior to catastrophic event to help prepare for long term needs as spouse understands the trajectory of Alzheimers disease.

## 2023-12-05 NOTE — BH CONSULTATION LIAISON ASSESSMENT NOTE - NSBHCHARTREVIEWLAB_PSY_A_CORE FT
Basic Metabolic Panel in AM (12.05.23 @ 05:50)    Sodium: 140 mmol/L    Potassium: 4.3 mmol/L    Chloride: 105: Chloride reference range changed from ..10/26/2022 mmol/L    Carbon Dioxide: 23.0 mmol/L    Anion Gap: 12 mmol/L    Blood Urea Nitrogen: 19.3 mg/dL    Creatinine: 1.39 mg/dL    Glucose: 95 mg/dL    Calcium: 8.3 mg/dL    eGFR: 54: The estimated glomerular filtration rate (eGFR) is calculated using the  2021 CKD-EPI creatinine equation, which does not have a coefficient for  race and is validated in individuals 18 years of age and older (N Engl J  Med 2021; 385:7267-0789). Creatinine-based eGFR may be inaccurate in  various situations including but not limited to extremes of muscle mass,  altered dietary protein intake, or medications that affect renal tubular  creatinine secretion. mL/min/1.73m2   Basic Metabolic Panel in AM (12.05.23 @ 05:50)    Sodium: 140 mmol/L    Potassium: 4.3 mmol/L    Chloride: 105: Chloride reference range changed from ..10/26/2022 mmol/L    Carbon Dioxide: 23.0 mmol/L    Anion Gap: 12 mmol/L    Blood Urea Nitrogen: 19.3 mg/dL    Creatinine: 1.39 mg/dL    Glucose: 95 mg/dL    Calcium: 8.3 mg/dL    eGFR: 54: The estimated glomerular filtration rate (eGFR) is calculated using the  2021 CKD-EPI creatinine equation, which does not have a coefficient for  race and is validated in individuals 18 years of age and older (N Engl J  Med 2021; 385:7088-4690). Creatinine-based eGFR may be inaccurate in  various situations including but not limited to extremes of muscle mass,  altered dietary protein intake, or medications that affect renal tubular  creatinine secretion. mL/min/1.73m2

## 2023-12-05 NOTE — BH CONSULTATION LIAISON ASSESSMENT NOTE - NSBHCHARTREVIEWINVESTIGATE_PSY_A_CORE FT
< from: 12 Lead ECG (11.30.23 @ 19:58) >      Ventricular Rate 78 BPM    Atrial Rate 78 BPM    P-R Interval 218 ms    QRS Duration 84 ms    Q-T Interval 354 ms    QTC Calculation(Bazett) 403 ms    P Axis 54 degrees    R Axis 49 degrees    T Axis 24 degrees    Diagnosis Line Sinus rhythm with 1st degree A-V block  Otherwise normal ECG    Confirmed by CASSIE ANDREA ([id number]) on 12/1/2023 4:16:06 AM    < end of copied text >

## 2023-12-05 NOTE — BH CONSULTATION LIAISON ASSESSMENT NOTE - GENERAL APPEARANCE
4/26/2023  3:51 PM    Advance Medical Directive currently not on file. Patient reports that he does have an Advance Medical Directive. Patient is encouraged to provide his PCP office with a copy of his Advance Medical Directive for his EMR; patient reports that he will have his wife take his Advance Medical Directive to his PCP office on Friday, 4/28/2023, so that it can be scanned into his chart.
No deformities present

## 2023-12-05 NOTE — DISCHARGE NOTE NURSING/CASE MANAGEMENT/SOCIAL WORK - NSDCFUADDAPPT_GEN_ALL_CORE_FT
***Patient requires a rolling walker due to Dementia to help complete their MRADLs.***  ***Patient requires a commode due to Dementia. Patient is confined to one level of the home without a bathroom.***

## 2023-12-05 NOTE — PROGRESS NOTE ADULT - SUBJECTIVE AND OBJECTIVE BOX
NEPHROLOGY INTERVAL HPI/OVERNIGHT EVENTS:  pt comfortable  no acute distress noted  toscano now out    MEDICATIONS  (STANDING):  cyanocobalamin 1000 MICROGram(s) Oral daily  donepezil 5 milliGRAM(s) Oral <User Schedule>  ergocalciferol 56607 Unit(s) Oral every week  heparin   Injectable 5000 Unit(s) SubCutaneous every 12 hours  lisinopril 2.5 milliGRAM(s) Oral daily  memantine 10 milliGRAM(s) Oral two times a day  QUEtiapine 12.5 milliGRAM(s) Oral at bedtime  sertraline 75 milliGRAM(s) Oral daily  sodium chloride 0.9% lock flush 3 milliLiter(s) IV Push every 8 hours  tamsulosin 0.8 milliGRAM(s) Oral at bedtime    MEDICATIONS  (PRN):  acetaminophen     Tablet .. 650 milliGRAM(s) Oral every 6 hours PRN Temp greater or equal to 38C (100.4F), Mild Pain (1 - 3)  aluminum hydroxide/magnesium hydroxide/simethicone Suspension 30 milliLiter(s) Oral every 4 hours PRN Dyspepsia  benzonatate 100 milliGRAM(s) Oral three times a day PRN Cough  melatonin 3 milliGRAM(s) Oral at bedtime PRN Insomnia  ondansetron Injectable 4 milliGRAM(s) IV Push every 8 hours PRN Nausea and/or Vomiting  QUEtiapine 12.5 milliGRAM(s) Oral two times a day PRN agitation      Allergies    No Known Allergies        Vital Signs Last 24 Hrs  T(C): 37.2 (05 Dec 2023 07:51), Max: 37.2 (05 Dec 2023 07:51)  T(F): 98.9 (05 Dec 2023 07:51), Max: 98.9 (05 Dec 2023 07:51)  HR: 81 (05 Dec 2023 07:51) (76 - 98)  BP: 116/70 (05 Dec 2023 07:51) (116/70 - 145/91)  BP(mean): --  RR: 18 (05 Dec 2023 07:51) (16 - 18)  SpO2: 93% (05 Dec 2023 07:51) (92% - 95%)    Parameters below as of 05 Dec 2023 07:51  Patient On (Oxygen Delivery Method): room air        PHYSICAL EXAM:      LABS:                        11.6   9.25  )-----------( 318      ( 05 Dec 2023 05:50 )             33.5     12-05    140  |  105  |  19.3  ----------------------------<  95  4.3   |  23.0  |  1.39<H>    Ca    8.3<L>      05 Dec 2023 05:50    TPro  5.9<L>  /  Alb  3.3  /  TBili  0.3<L>  /  DBili  x   /  AST  19  /  ALT  20  /  AlkPhos  65  12-04      Urinalysis Basic - ( 05 Dec 2023 05:50 )    Color: x / Appearance: x / SG: x / pH: x  Gluc: 95 mg/dL / Ketone: x  / Bili: x / Urobili: x   Blood: x / Protein: x / Nitrite: x   Leuk Esterase: x / RBC: x / WBC x   Sq Epi: x / Non Sq Epi: x / Bacteria: x          RADIOLOGY & ADDITIONAL TESTS:   NEPHROLOGY INTERVAL HPI/OVERNIGHT EVENTS:  pt comfortable  no acute distress noted  toscano now out    MEDICATIONS  (STANDING):  cyanocobalamin 1000 MICROGram(s) Oral daily  donepezil 5 milliGRAM(s) Oral <User Schedule>  ergocalciferol 17416 Unit(s) Oral every week  heparin   Injectable 5000 Unit(s) SubCutaneous every 12 hours  lisinopril 2.5 milliGRAM(s) Oral daily  memantine 10 milliGRAM(s) Oral two times a day  QUEtiapine 12.5 milliGRAM(s) Oral at bedtime  sertraline 75 milliGRAM(s) Oral daily  sodium chloride 0.9% lock flush 3 milliLiter(s) IV Push every 8 hours  tamsulosin 0.8 milliGRAM(s) Oral at bedtime    MEDICATIONS  (PRN):  acetaminophen     Tablet .. 650 milliGRAM(s) Oral every 6 hours PRN Temp greater or equal to 38C (100.4F), Mild Pain (1 - 3)  aluminum hydroxide/magnesium hydroxide/simethicone Suspension 30 milliLiter(s) Oral every 4 hours PRN Dyspepsia  benzonatate 100 milliGRAM(s) Oral three times a day PRN Cough  melatonin 3 milliGRAM(s) Oral at bedtime PRN Insomnia  ondansetron Injectable 4 milliGRAM(s) IV Push every 8 hours PRN Nausea and/or Vomiting  QUEtiapine 12.5 milliGRAM(s) Oral two times a day PRN agitation      Allergies    No Known Allergies        Vital Signs Last 24 Hrs  T(C): 37.2 (05 Dec 2023 07:51), Max: 37.2 (05 Dec 2023 07:51)  T(F): 98.9 (05 Dec 2023 07:51), Max: 98.9 (05 Dec 2023 07:51)  HR: 81 (05 Dec 2023 07:51) (76 - 98)  BP: 116/70 (05 Dec 2023 07:51) (116/70 - 145/91)  BP(mean): --  RR: 18 (05 Dec 2023 07:51) (16 - 18)  SpO2: 93% (05 Dec 2023 07:51) (92% - 95%)    Parameters below as of 05 Dec 2023 07:51  Patient On (Oxygen Delivery Method): room air        PHYSICAL EXAM:      LABS:                        11.6   9.25  )-----------( 318      ( 05 Dec 2023 05:50 )             33.5     12-05    140  |  105  |  19.3  ----------------------------<  95  4.3   |  23.0  |  1.39<H>    Ca    8.3<L>      05 Dec 2023 05:50    TPro  5.9<L>  /  Alb  3.3  /  TBili  0.3<L>  /  DBili  x   /  AST  19  /  ALT  20  /  AlkPhos  65  12-04      Urinalysis Basic - ( 05 Dec 2023 05:50 )    Color: x / Appearance: x / SG: x / pH: x  Gluc: 95 mg/dL / Ketone: x  / Bili: x / Urobili: x   Blood: x / Protein: x / Nitrite: x   Leuk Esterase: x / RBC: x / WBC x   Sq Epi: x / Non Sq Epi: x / Bacteria: x          RADIOLOGY & ADDITIONAL TESTS:

## 2023-12-05 NOTE — DISCHARGE NOTE NURSING/CASE MANAGEMENT/SOCIAL WORK - NSDCPEFALRISK_GEN_ALL_CORE
For information on Fall & Injury Prevention, visit: [email or web address] OR  [email or web address] OR  https://www.cdc.gov/steadi/patient.html For information on Fall & Injury Prevention, visit: [email or web address] OR  [email or web address] OR  https://www.cdc.gov/steadi/patient.html

## 2023-12-05 NOTE — DISCHARGE NOTE PROVIDER - CARE PROVIDER_API CALL
Adalberto Monroe  Nephrology  [address] [place] [address]  Phone: [phone]  Fax: [phone]  Follow Up Time: 2 weeks   Adalberto Monroe  Nephrology  [address] [place] [address]  Phone: [phone]  Fax: [phone]  Follow Up Time: 2 weeks

## 2023-12-06 ENCOUNTER — APPOINTMENT (OUTPATIENT)
Dept: UROLOGY | Facility: CLINIC | Age: 73
End: 2023-12-06

## 2023-12-06 ENCOUNTER — NON-APPOINTMENT (OUTPATIENT)
Age: 73
End: 2023-12-06

## 2023-12-07 PROBLEM — N18.30 CHRONIC KIDNEY DISEASE, STAGE 3 UNSPECIFIED: Chronic | Status: ACTIVE | Noted: 2023-12-01

## 2023-12-07 PROBLEM — F03.90 UNSPECIFIED DEMENTIA, UNSPECIFIED SEVERITY, WITHOUT BEHAVIORAL DISTURBANCE, PSYCHOTIC DISTURBANCE, MOOD DISTURBANCE, AND ANXIETY: Chronic | Status: ACTIVE | Noted: 2023-01-01

## 2023-12-07 PROBLEM — Z87.898 PERSONAL HISTORY OF OTHER SPECIFIED CONDITIONS: Chronic | Status: ACTIVE | Noted: 2023-12-01

## 2023-12-07 PROBLEM — F03.90 UNSPECIFIED DEMENTIA WITHOUT BEHAVIORAL DISTURBANCE: Chronic | Status: ACTIVE | Noted: 2023-12-01

## 2023-12-12 ENCOUNTER — APPOINTMENT (OUTPATIENT)
Dept: INTERNAL MEDICINE | Facility: CLINIC | Age: 73
End: 2023-12-12
Payer: MEDICARE

## 2023-12-12 VITALS
BODY MASS INDEX: 28.35 KG/M2 | HEART RATE: 62 BPM | RESPIRATION RATE: 14 BRPM | HEIGHT: 70 IN | OXYGEN SATURATION: 98 % | DIASTOLIC BLOOD PRESSURE: 60 MMHG | SYSTOLIC BLOOD PRESSURE: 116 MMHG | WEIGHT: 198 LBS

## 2023-12-12 DIAGNOSIS — N13.8 BENIGN PROSTATIC HYPERPLASIA WITH LOWER URINARY TRACT SYMPMS: ICD-10-CM

## 2023-12-12 DIAGNOSIS — Z09 ENCOUNTER FOR FOLLOW-UP EXAMINATION AFTER COMPLETED TREATMENT FOR CONDITIONS OTHER THAN MALIGNANT NEOPLASM: ICD-10-CM

## 2023-12-12 DIAGNOSIS — N40.1 BENIGN PROSTATIC HYPERPLASIA WITH LOWER URINARY TRACT SYMPMS: ICD-10-CM

## 2023-12-12 PROCEDURE — 36415 COLL VENOUS BLD VENIPUNCTURE: CPT

## 2023-12-12 PROCEDURE — 99496 TRANSJ CARE MGMT HIGH F2F 7D: CPT | Mod: 25

## 2023-12-12 RX ORDER — MEMANTINE HYDROCHLORIDE 5 MG-10 MG
28 X 5 MG & KIT ORAL
Qty: 1 | Refills: 0 | Status: DISCONTINUED | COMMUNITY
Start: 2023-11-06 | End: 2023-12-12

## 2023-12-12 RX ORDER — AMLODIPINE BESYLATE 5 MG/1
5 TABLET ORAL
Qty: 90 | Refills: 1 | Status: ACTIVE | COMMUNITY
Start: 2023-12-12

## 2023-12-12 RX ORDER — BENZONATATE 200 MG/1
200 CAPSULE ORAL 3 TIMES DAILY
Qty: 30 | Refills: 0 | Status: DISCONTINUED | COMMUNITY
Start: 2023-11-28 | End: 2023-12-12

## 2023-12-13 LAB
ANION GAP SERPL CALC-SCNC: 14 MMOL/L
BASOPHILS # BLD AUTO: 0.05 K/UL
BASOPHILS NFR BLD AUTO: 0.6 %
BUN SERPL-MCNC: 25 MG/DL
CALCIUM SERPL-MCNC: 9.4 MG/DL
CHLORIDE SERPL-SCNC: 106 MMOL/L
CO2 SERPL-SCNC: 19 MMOL/L
CREAT SERPL-MCNC: 1.45 MG/DL
EGFR: 51 ML/MIN/1.73M2
EOSINOPHIL # BLD AUTO: 0.08 K/UL
EOSINOPHIL NFR BLD AUTO: 0.9 %
GLUCOSE SERPL-MCNC: 94 MG/DL
HCT VFR BLD CALC: 38.5 %
HGB BLD-MCNC: 12.8 G/DL
IMM GRANULOCYTES NFR BLD AUTO: 0.3 %
LYMPHOCYTES # BLD AUTO: 1.13 K/UL
LYMPHOCYTES NFR BLD AUTO: 12.5 %
MAN DIFF?: NORMAL
MCHC RBC-ENTMCNC: 32 PG
MCHC RBC-ENTMCNC: 33.2 GM/DL
MCV RBC AUTO: 96.3 FL
MONOCYTES # BLD AUTO: 0.61 K/UL
MONOCYTES NFR BLD AUTO: 6.8 %
NEUTROPHILS # BLD AUTO: 7.12 K/UL
NEUTROPHILS NFR BLD AUTO: 78.9 %
PLATELET # BLD AUTO: 432 K/UL
POTASSIUM SERPL-SCNC: 4.8 MMOL/L
RBC # BLD: 4 M/UL
RBC # FLD: 12.6 %
SODIUM SERPL-SCNC: 139 MMOL/L
WBC # FLD AUTO: 9.02 K/UL

## 2023-12-13 PROCEDURE — 84100 ASSAY OF PHOSPHORUS: CPT

## 2023-12-13 PROCEDURE — 87086 URINE CULTURE/COLONY COUNT: CPT

## 2023-12-13 PROCEDURE — 0225U NFCT DS DNA&RNA 21 SARSCOV2: CPT

## 2023-12-13 PROCEDURE — 82306 VITAMIN D 25 HYDROXY: CPT

## 2023-12-13 PROCEDURE — 71045 X-RAY EXAM CHEST 1 VIEW: CPT

## 2023-12-13 PROCEDURE — 80053 COMPREHEN METABOLIC PANEL: CPT

## 2023-12-13 PROCEDURE — 82607 VITAMIN B-12: CPT

## 2023-12-13 PROCEDURE — 85025 COMPLETE CBC W/AUTO DIFF WBC: CPT

## 2023-12-13 PROCEDURE — 93005 ELECTROCARDIOGRAM TRACING: CPT

## 2023-12-13 PROCEDURE — 83735 ASSAY OF MAGNESIUM: CPT

## 2023-12-13 PROCEDURE — 97110 THERAPEUTIC EXERCISES: CPT

## 2023-12-13 PROCEDURE — 81003 URINALYSIS AUTO W/O SCOPE: CPT

## 2023-12-13 PROCEDURE — 36415 COLL VENOUS BLD VENIPUNCTURE: CPT

## 2023-12-13 PROCEDURE — 86803 HEPATITIS C AB TEST: CPT

## 2023-12-13 PROCEDURE — 82746 ASSAY OF FOLIC ACID SERUM: CPT

## 2023-12-13 PROCEDURE — 97116 GAIT TRAINING THERAPY: CPT

## 2023-12-13 PROCEDURE — 85027 COMPLETE CBC AUTOMATED: CPT

## 2023-12-13 PROCEDURE — 76770 US EXAM ABDO BACK WALL COMP: CPT

## 2023-12-13 PROCEDURE — G0103: CPT

## 2023-12-13 PROCEDURE — 84443 ASSAY THYROID STIM HORMONE: CPT

## 2023-12-13 PROCEDURE — 85610 PROTHROMBIN TIME: CPT

## 2023-12-13 PROCEDURE — 80048 BASIC METABOLIC PNL TOTAL CA: CPT

## 2023-12-13 PROCEDURE — 85730 THROMBOPLASTIN TIME PARTIAL: CPT

## 2023-12-13 PROCEDURE — 99285 EMERGENCY DEPT VISIT HI MDM: CPT

## 2023-12-16 ENCOUNTER — RX RENEWAL (OUTPATIENT)
Age: 73
End: 2023-12-16

## 2023-12-18 ENCOUNTER — APPOINTMENT (OUTPATIENT)
Dept: GERIATRICS | Facility: CLINIC | Age: 73
End: 2023-12-18

## 2023-12-18 ENCOUNTER — APPOINTMENT (OUTPATIENT)
Dept: GERIATRICS | Facility: CLINIC | Age: 73
End: 2023-12-18
Payer: MEDICARE

## 2023-12-18 PROCEDURE — 99443: CPT | Mod: 95

## 2023-12-30 NOTE — ED ADULT TRIAGE NOTE - NS ED NURSE BANDS TYPE
Problem: SUBSTANCE USE/ABUSE  Goal: By discharge, will develop insight into their chemical dependency and sustain motivation to continue in recovery  Description: INTERVENTIONS:  - Attends all daily group sessions and scheduled AA groups  - Actively practices coping skills through participation in the therapeutic community and adherence to program rules  - Reviews and completes assignments from individual treatment plan  - Assist patient development of understanding of their personal cycle of addiction and relapse triggers  Outcome: Progressing  Goal: By discharge, patient will have ongoing treatment plan addressing chemical dependency  Description: INTERVENTIONS:  - Assist patient with resources and/or appointments for ongoing recovery based living  Outcome: Progressing      Name band;

## 2024-01-01 ENCOUNTER — INPATIENT (INPATIENT)
Facility: HOSPITAL | Age: 74
LOS: 3 days | Discharge: HOME | DRG: 948 | End: 2024-10-19
Attending: STUDENT IN AN ORGANIZED HEALTH CARE EDUCATION/TRAINING PROGRAM | Admitting: GENERAL ACUTE CARE HOSPITAL
Payer: MEDICARE

## 2024-01-01 VITALS
RESPIRATION RATE: 20 BRPM | WEIGHT: 179.9 LBS | OXYGEN SATURATION: 98 % | HEART RATE: 109 BPM | DIASTOLIC BLOOD PRESSURE: 94 MMHG | SYSTOLIC BLOOD PRESSURE: 146 MMHG | TEMPERATURE: 99 F

## 2024-01-01 VITALS
HEART RATE: 82 BPM | SYSTOLIC BLOOD PRESSURE: 111 MMHG | OXYGEN SATURATION: 96 % | TEMPERATURE: 98 F | RESPIRATION RATE: 18 BRPM | DIASTOLIC BLOOD PRESSURE: 65 MMHG

## 2024-01-01 DIAGNOSIS — Z98.890 OTHER SPECIFIED POSTPROCEDURAL STATES: Chronic | ICD-10-CM

## 2024-01-01 DIAGNOSIS — R50.9 FEVER, UNSPECIFIED: ICD-10-CM

## 2024-01-01 DIAGNOSIS — F03.90 UNSPECIFIED DEMENTIA, UNSPECIFIED SEVERITY, WITHOUT BEHAVIORAL DISTURBANCE, PSYCHOTIC DISTURBANCE, MOOD DISTURBANCE, AND ANXIETY: ICD-10-CM

## 2024-01-01 DIAGNOSIS — Z51.5 ENCOUNTER FOR PALLIATIVE CARE: ICD-10-CM

## 2024-01-01 DIAGNOSIS — N40.0 BENIGN PROSTATIC HYPERPLASIA WITHOUT LOWER URINARY TRACT SYMPTOMS: ICD-10-CM

## 2024-01-01 DIAGNOSIS — R41.82 ALTERED MENTAL STATUS, UNSPECIFIED: ICD-10-CM

## 2024-01-01 LAB
-  COAGULASE NEGATIVE STAPHYLOCOCCUS: SIGNIFICANT CHANGE UP
A1C WITH ESTIMATED AVERAGE GLUCOSE RESULT: 5.3 % — SIGNIFICANT CHANGE UP (ref 4–5.6)
ALBUMIN SERPL ELPH-MCNC: 2.9 G/DL — LOW (ref 3.3–5.2)
ALBUMIN SERPL ELPH-MCNC: 3.8 G/DL — SIGNIFICANT CHANGE UP (ref 3.3–5.2)
ALP SERPL-CCNC: 67 U/L — SIGNIFICANT CHANGE UP (ref 40–120)
ALP SERPL-CCNC: 76 U/L — SIGNIFICANT CHANGE UP (ref 40–120)
ALT FLD-CCNC: 25 U/L — SIGNIFICANT CHANGE UP
ALT FLD-CCNC: 29 U/L — SIGNIFICANT CHANGE UP
AMMONIA BLD-MCNC: 35 UMOL/L — SIGNIFICANT CHANGE UP (ref 11–55)
ANION GAP SERPL CALC-SCNC: 11 MMOL/L — SIGNIFICANT CHANGE UP (ref 5–17)
ANION GAP SERPL CALC-SCNC: 11 MMOL/L — SIGNIFICANT CHANGE UP (ref 5–17)
ANION GAP SERPL CALC-SCNC: 9 MMOL/L — SIGNIFICANT CHANGE UP (ref 5–17)
ANION GAP SERPL CALC-SCNC: 9 MMOL/L — SIGNIFICANT CHANGE UP (ref 5–17)
ANISOCYTOSIS BLD QL: SLIGHT — SIGNIFICANT CHANGE UP
APPEARANCE UR: ABNORMAL
APTT BLD: 31.2 SEC — SIGNIFICANT CHANGE UP (ref 24.5–35.6)
AST SERPL-CCNC: 38 U/L — SIGNIFICANT CHANGE UP
AST SERPL-CCNC: 40 U/L — HIGH
BACTERIA # UR AUTO: NEGATIVE /HPF — SIGNIFICANT CHANGE UP
BASOPHILS # BLD AUTO: 0 K/UL — SIGNIFICANT CHANGE UP (ref 0–0.2)
BASOPHILS # BLD AUTO: 0.03 K/UL — SIGNIFICANT CHANGE UP (ref 0–0.2)
BASOPHILS # BLD AUTO: 0.05 K/UL — SIGNIFICANT CHANGE UP (ref 0–0.2)
BASOPHILS # BLD AUTO: 0.08 K/UL — SIGNIFICANT CHANGE UP (ref 0–0.2)
BASOPHILS NFR BLD AUTO: 0 % — SIGNIFICANT CHANGE UP (ref 0–2)
BASOPHILS NFR BLD AUTO: 0.3 % — SIGNIFICANT CHANGE UP (ref 0–2)
BASOPHILS NFR BLD AUTO: 0.6 % — SIGNIFICANT CHANGE UP (ref 0–2)
BASOPHILS NFR BLD AUTO: 1.2 % — SIGNIFICANT CHANGE UP (ref 0–2)
BILIRUB SERPL-MCNC: 0.8 MG/DL — SIGNIFICANT CHANGE UP (ref 0.4–2)
BILIRUB SERPL-MCNC: 1 MG/DL — SIGNIFICANT CHANGE UP (ref 0.4–2)
BILIRUB UR-MCNC: NEGATIVE — SIGNIFICANT CHANGE UP
BUN SERPL-MCNC: 12 MG/DL — SIGNIFICANT CHANGE UP (ref 8–20)
BUN SERPL-MCNC: 12.8 MG/DL — SIGNIFICANT CHANGE UP (ref 8–20)
BUN SERPL-MCNC: 13.1 MG/DL — SIGNIFICANT CHANGE UP (ref 8–20)
BUN SERPL-MCNC: 13.3 MG/DL — SIGNIFICANT CHANGE UP (ref 8–20)
CALCIUM SERPL-MCNC: 8.2 MG/DL — LOW (ref 8.4–10.5)
CALCIUM SERPL-MCNC: 8.4 MG/DL — SIGNIFICANT CHANGE UP (ref 8.4–10.5)
CALCIUM SERPL-MCNC: 8.7 MG/DL — SIGNIFICANT CHANGE UP (ref 8.4–10.5)
CALCIUM SERPL-MCNC: 9 MG/DL — SIGNIFICANT CHANGE UP (ref 8.4–10.5)
CAST: 1 /LPF — SIGNIFICANT CHANGE UP (ref 0–4)
CHLORIDE SERPL-SCNC: 103 MMOL/L — SIGNIFICANT CHANGE UP (ref 96–108)
CHLORIDE SERPL-SCNC: 106 MMOL/L — SIGNIFICANT CHANGE UP (ref 96–108)
CHLORIDE SERPL-SCNC: 106 MMOL/L — SIGNIFICANT CHANGE UP (ref 96–108)
CHLORIDE SERPL-SCNC: 107 MMOL/L — SIGNIFICANT CHANGE UP (ref 96–108)
CHOLEST SERPL-MCNC: 141 MG/DL — SIGNIFICANT CHANGE UP
CO2 SERPL-SCNC: 22 MMOL/L — SIGNIFICANT CHANGE UP (ref 22–29)
CO2 SERPL-SCNC: 26 MMOL/L — SIGNIFICANT CHANGE UP (ref 22–29)
CO2 SERPL-SCNC: 27 MMOL/L — SIGNIFICANT CHANGE UP (ref 22–29)
CO2 SERPL-SCNC: 28 MMOL/L — SIGNIFICANT CHANGE UP (ref 22–29)
COLOR SPEC: YELLOW — SIGNIFICANT CHANGE UP
CREAT SERPL-MCNC: 1.08 MG/DL — SIGNIFICANT CHANGE UP (ref 0.5–1.3)
CREAT SERPL-MCNC: 1.09 MG/DL — SIGNIFICANT CHANGE UP (ref 0.5–1.3)
CREAT SERPL-MCNC: 1.13 MG/DL — SIGNIFICANT CHANGE UP (ref 0.5–1.3)
CREAT SERPL-MCNC: 1.27 MG/DL — SIGNIFICANT CHANGE UP (ref 0.5–1.3)
CULTURE RESULTS: ABNORMAL
CULTURE RESULTS: NO GROWTH — SIGNIFICANT CHANGE UP
CULTURE RESULTS: SIGNIFICANT CHANGE UP
CULTURE RESULTS: SIGNIFICANT CHANGE UP
DIFF PNL FLD: NEGATIVE — SIGNIFICANT CHANGE UP
EGFR: 59 ML/MIN/1.73M2 — LOW
EGFR: 59 ML/MIN/1.73M2 — LOW
EGFR: 68 ML/MIN/1.73M2 — SIGNIFICANT CHANGE UP
EGFR: 68 ML/MIN/1.73M2 — SIGNIFICANT CHANGE UP
EGFR: 71 ML/MIN/1.73M2 — SIGNIFICANT CHANGE UP
EGFR: 71 ML/MIN/1.73M2 — SIGNIFICANT CHANGE UP
EGFR: 72 ML/MIN/1.73M2 — SIGNIFICANT CHANGE UP
EGFR: 72 ML/MIN/1.73M2 — SIGNIFICANT CHANGE UP
EOSINOPHIL # BLD AUTO: 0 K/UL — SIGNIFICANT CHANGE UP (ref 0–0.5)
EOSINOPHIL # BLD AUTO: 0.03 K/UL — SIGNIFICANT CHANGE UP (ref 0–0.5)
EOSINOPHIL # BLD AUTO: 0.26 K/UL — SIGNIFICANT CHANGE UP (ref 0–0.5)
EOSINOPHIL # BLD AUTO: 0.34 K/UL — SIGNIFICANT CHANGE UP (ref 0–0.5)
EOSINOPHIL NFR BLD AUTO: 0 % — SIGNIFICANT CHANGE UP (ref 0–6)
EOSINOPHIL NFR BLD AUTO: 0.3 % — SIGNIFICANT CHANGE UP (ref 0–6)
EOSINOPHIL NFR BLD AUTO: 3.3 % — SIGNIFICANT CHANGE UP (ref 0–6)
EOSINOPHIL NFR BLD AUTO: 5 % — SIGNIFICANT CHANGE UP (ref 0–6)
ESTIMATED AVERAGE GLUCOSE: 105 MG/DL — SIGNIFICANT CHANGE UP (ref 68–114)
FLUAV AG NPH QL: SIGNIFICANT CHANGE UP
FLUBV AG NPH QL: SIGNIFICANT CHANGE UP
GLUCOSE BLDC GLUCOMTR-MCNC: 164 MG/DL — HIGH (ref 70–99)
GLUCOSE SERPL-MCNC: 107 MG/DL — HIGH (ref 70–99)
GLUCOSE SERPL-MCNC: 113 MG/DL — HIGH (ref 70–99)
GLUCOSE SERPL-MCNC: 80 MG/DL — SIGNIFICANT CHANGE UP (ref 70–99)
GLUCOSE SERPL-MCNC: 85 MG/DL — SIGNIFICANT CHANGE UP (ref 70–99)
GLUCOSE UR QL: NEGATIVE MG/DL — SIGNIFICANT CHANGE UP
GRAM STN FLD: ABNORMAL
GRAM STN FLD: ABNORMAL
HAV IGM SER-ACNC: SIGNIFICANT CHANGE UP
HBV CORE IGM SER-ACNC: SIGNIFICANT CHANGE UP
HBV SURFACE AG SER-ACNC: SIGNIFICANT CHANGE UP
HCT VFR BLD CALC: 36.5 % — LOW (ref 39–50)
HCT VFR BLD CALC: 39 % — SIGNIFICANT CHANGE UP (ref 39–50)
HCT VFR BLD CALC: 39.8 % — SIGNIFICANT CHANGE UP (ref 39–50)
HCT VFR BLD CALC: 42.8 % — SIGNIFICANT CHANGE UP (ref 39–50)
HCV AB S/CO SERPL IA: 0.07 S/CO — SIGNIFICANT CHANGE UP (ref 0–0.99)
HCV AB SERPL-IMP: SIGNIFICANT CHANGE UP
HDLC SERPL-MCNC: 48 MG/DL — SIGNIFICANT CHANGE UP
HGB BLD-MCNC: 12.5 G/DL — LOW (ref 13–17)
HGB BLD-MCNC: 13.1 G/DL — SIGNIFICANT CHANGE UP (ref 13–17)
HGB BLD-MCNC: 13.3 G/DL — SIGNIFICANT CHANGE UP (ref 13–17)
HGB BLD-MCNC: 14.7 G/DL — SIGNIFICANT CHANGE UP (ref 13–17)
IMM GRANULOCYTES NFR BLD AUTO: 0.3 % — SIGNIFICANT CHANGE UP (ref 0–0.9)
IMM GRANULOCYTES NFR BLD AUTO: 0.4 % — SIGNIFICANT CHANGE UP (ref 0–0.9)
IMM GRANULOCYTES NFR BLD AUTO: 0.4 % — SIGNIFICANT CHANGE UP (ref 0–0.9)
INR BLD: 1.11 RATIO — SIGNIFICANT CHANGE UP (ref 0.85–1.16)
KETONES UR-MCNC: NEGATIVE MG/DL — SIGNIFICANT CHANGE UP
LACTATE BLDV-MCNC: 1.7 MMOL/L — SIGNIFICANT CHANGE UP (ref 0.5–2)
LDLC SERPL-MCNC: 75 MG/DL — SIGNIFICANT CHANGE UP
LEUKOCYTE ESTERASE UR-ACNC: NEGATIVE — SIGNIFICANT CHANGE UP
LIPID PNL WITH DIRECT LDL SERPL: 75 MG/DL — SIGNIFICANT CHANGE UP
LYMPHOCYTES # BLD AUTO: 0.64 K/UL — LOW (ref 1–3.3)
LYMPHOCYTES # BLD AUTO: 1.42 K/UL — SIGNIFICANT CHANGE UP (ref 1–3.3)
LYMPHOCYTES # BLD AUTO: 1.6 K/UL — SIGNIFICANT CHANGE UP (ref 1–3.3)
LYMPHOCYTES # BLD AUTO: 1.97 K/UL — SIGNIFICANT CHANGE UP (ref 1–3.3)
LYMPHOCYTES # BLD AUTO: 13.8 % — SIGNIFICANT CHANGE UP (ref 13–44)
LYMPHOCYTES # BLD AUTO: 23.3 % — SIGNIFICANT CHANGE UP (ref 13–44)
LYMPHOCYTES # BLD AUTO: 24.7 % — SIGNIFICANT CHANGE UP (ref 13–44)
LYMPHOCYTES # BLD AUTO: 5.1 % — LOW (ref 13–44)
MAGNESIUM SERPL-MCNC: 1.8 MG/DL — SIGNIFICANT CHANGE UP (ref 1.6–2.6)
MAGNESIUM SERPL-MCNC: 2 MG/DL — SIGNIFICANT CHANGE UP (ref 1.6–2.6)
MANUAL SMEAR VERIFICATION: SIGNIFICANT CHANGE UP
MCHC RBC-ENTMCNC: 31.6 PG — SIGNIFICANT CHANGE UP (ref 27–34)
MCHC RBC-ENTMCNC: 31.7 PG — SIGNIFICANT CHANGE UP (ref 27–34)
MCHC RBC-ENTMCNC: 32.2 PG — SIGNIFICANT CHANGE UP (ref 27–34)
MCHC RBC-ENTMCNC: 32.3 PG — SIGNIFICANT CHANGE UP (ref 27–34)
MCHC RBC-ENTMCNC: 33.4 GM/DL — SIGNIFICANT CHANGE UP (ref 32–36)
MCHC RBC-ENTMCNC: 33.6 GM/DL — SIGNIFICANT CHANGE UP (ref 32–36)
MCHC RBC-ENTMCNC: 34.2 GM/DL — SIGNIFICANT CHANGE UP (ref 32–36)
MCHC RBC-ENTMCNC: 34.3 GM/DL — SIGNIFICANT CHANGE UP (ref 32–36)
MCV RBC AUTO: 93.7 FL — SIGNIFICANT CHANGE UP (ref 80–100)
MCV RBC AUTO: 94.2 FL — SIGNIFICANT CHANGE UP (ref 80–100)
MCV RBC AUTO: 94.3 FL — SIGNIFICANT CHANGE UP (ref 80–100)
MCV RBC AUTO: 94.8 FL — SIGNIFICANT CHANGE UP (ref 80–100)
METHOD TYPE: SIGNIFICANT CHANGE UP
MONOCYTES # BLD AUTO: 0.68 K/UL — SIGNIFICANT CHANGE UP (ref 0–0.9)
MONOCYTES # BLD AUTO: 1.02 K/UL — HIGH (ref 0–0.9)
MONOCYTES # BLD AUTO: 1.53 K/UL — HIGH (ref 0–0.9)
MONOCYTES # BLD AUTO: 1.91 K/UL — HIGH (ref 0–0.9)
MONOCYTES NFR BLD AUTO: 12.8 % — SIGNIFICANT CHANGE UP (ref 2–14)
MONOCYTES NFR BLD AUTO: 14.9 % — HIGH (ref 2–14)
MONOCYTES NFR BLD AUTO: 15.3 % — HIGH (ref 2–14)
MONOCYTES NFR BLD AUTO: 9.9 % — SIGNIFICANT CHANGE UP (ref 2–14)
MRSA PCR RESULT.: SIGNIFICANT CHANGE UP
NEUTROPHILS # BLD AUTO: 4.14 K/UL — SIGNIFICANT CHANGE UP (ref 1.8–7.4)
NEUTROPHILS # BLD AUTO: 4.63 K/UL — SIGNIFICANT CHANGE UP (ref 1.8–7.4)
NEUTROPHILS # BLD AUTO: 7.22 K/UL — SIGNIFICANT CHANGE UP (ref 1.8–7.4)
NEUTROPHILS # BLD AUTO: 9.4 K/UL — HIGH (ref 1.8–7.4)
NEUTROPHILS NFR BLD AUTO: 58.2 % — SIGNIFICANT CHANGE UP (ref 43–77)
NEUTROPHILS NFR BLD AUTO: 60.3 % — SIGNIFICANT CHANGE UP (ref 43–77)
NEUTROPHILS NFR BLD AUTO: 70.3 % — SIGNIFICANT CHANGE UP (ref 43–77)
NEUTROPHILS NFR BLD AUTO: 75.4 % — SIGNIFICANT CHANGE UP (ref 43–77)
NITRITE UR-MCNC: NEGATIVE — SIGNIFICANT CHANGE UP
NONHDLC SERPL-MCNC: 93 MG/DL — SIGNIFICANT CHANGE UP
ORGANISM # SPEC MICROSCOPIC CNT: ABNORMAL
ORGANISM # SPEC MICROSCOPIC CNT: SIGNIFICANT CHANGE UP
OVALOCYTES BLD QL SMEAR: SLIGHT — SIGNIFICANT CHANGE UP
PH UR: 5.5 — SIGNIFICANT CHANGE UP (ref 5–8)
PHOSPHATE SERPL-MCNC: 3 MG/DL — SIGNIFICANT CHANGE UP (ref 2.4–4.7)
PHOSPHATE SERPL-MCNC: 3.1 MG/DL — SIGNIFICANT CHANGE UP (ref 2.4–4.7)
PLAT MORPH BLD: NORMAL — SIGNIFICANT CHANGE UP
PLATELET # BLD AUTO: 225 K/UL — SIGNIFICANT CHANGE UP (ref 150–400)
PLATELET # BLD AUTO: 239 K/UL — SIGNIFICANT CHANGE UP (ref 150–400)
PLATELET # BLD AUTO: 249 K/UL — SIGNIFICANT CHANGE UP (ref 150–400)
PLATELET # BLD AUTO: 273 K/UL — SIGNIFICANT CHANGE UP (ref 150–400)
POIKILOCYTOSIS BLD QL AUTO: SLIGHT — SIGNIFICANT CHANGE UP
POLYCHROMASIA BLD QL SMEAR: SLIGHT — SIGNIFICANT CHANGE UP
POTASSIUM SERPL-MCNC: 3.4 MMOL/L — LOW (ref 3.5–5.3)
POTASSIUM SERPL-MCNC: 3.6 MMOL/L — SIGNIFICANT CHANGE UP (ref 3.5–5.3)
POTASSIUM SERPL-MCNC: 3.7 MMOL/L — SIGNIFICANT CHANGE UP (ref 3.5–5.3)
POTASSIUM SERPL-MCNC: 4.3 MMOL/L — SIGNIFICANT CHANGE UP (ref 3.5–5.3)
POTASSIUM SERPL-SCNC: 3.4 MMOL/L — LOW (ref 3.5–5.3)
POTASSIUM SERPL-SCNC: 3.6 MMOL/L — SIGNIFICANT CHANGE UP (ref 3.5–5.3)
POTASSIUM SERPL-SCNC: 3.7 MMOL/L — SIGNIFICANT CHANGE UP (ref 3.5–5.3)
POTASSIUM SERPL-SCNC: 4.3 MMOL/L — SIGNIFICANT CHANGE UP (ref 3.5–5.3)
PROCALCITONIN SERPL-MCNC: 0.2 NG/ML — HIGH (ref 0.02–0.1)
PROT SERPL-MCNC: 5.5 G/DL — LOW (ref 6.6–8.7)
PROT SERPL-MCNC: 6.6 G/DL — SIGNIFICANT CHANGE UP (ref 6.6–8.7)
PROT UR-MCNC: SIGNIFICANT CHANGE UP MG/DL
PROTHROM AB SERPL-ACNC: 12.9 SEC — SIGNIFICANT CHANGE UP (ref 9.9–13.4)
PSA FLD-MCNC: 4.53 NG/ML — HIGH (ref 0–4)
RAPID RVP RESULT: SIGNIFICANT CHANGE UP
RBC # BLD: 3.87 M/UL — LOW (ref 4.2–5.8)
RBC # BLD: 4.14 M/UL — LOW (ref 4.2–5.8)
RBC # BLD: 4.2 M/UL — SIGNIFICANT CHANGE UP (ref 4.2–5.8)
RBC # BLD: 4.57 M/UL — SIGNIFICANT CHANGE UP (ref 4.2–5.8)
RBC # FLD: 13.3 % — SIGNIFICANT CHANGE UP (ref 10.3–14.5)
RBC # FLD: 13.4 % — SIGNIFICANT CHANGE UP (ref 10.3–14.5)
RBC # FLD: 13.6 % — SIGNIFICANT CHANGE UP (ref 10.3–14.5)
RBC # FLD: 13.6 % — SIGNIFICANT CHANGE UP (ref 10.3–14.5)
RBC BLD AUTO: ABNORMAL
RBC CASTS # UR COMP ASSIST: 4 /HPF — SIGNIFICANT CHANGE UP (ref 0–4)
RSV RNA NPH QL NAA+NON-PROBE: SIGNIFICANT CHANGE UP
S AUREUS DNA NOSE QL NAA+PROBE: SIGNIFICANT CHANGE UP
SARS-COV-2 RNA SPEC QL NAA+PROBE: SIGNIFICANT CHANGE UP
SARS-COV-2 RNA SPEC QL NAA+PROBE: SIGNIFICANT CHANGE UP
SODIUM SERPL-SCNC: 140 MMOL/L — SIGNIFICANT CHANGE UP (ref 135–145)
SODIUM SERPL-SCNC: 141 MMOL/L — SIGNIFICANT CHANGE UP (ref 135–145)
SODIUM SERPL-SCNC: 141 MMOL/L — SIGNIFICANT CHANGE UP (ref 135–145)
SODIUM SERPL-SCNC: 143 MMOL/L — SIGNIFICANT CHANGE UP (ref 135–145)
SP GR SPEC: 1.02 — SIGNIFICANT CHANGE UP (ref 1–1.03)
SPECIMEN SOURCE: SIGNIFICANT CHANGE UP
SQUAMOUS # UR AUTO: 1 /HPF — SIGNIFICANT CHANGE UP (ref 0–5)
TRIGL SERPL-MCNC: 92 MG/DL — SIGNIFICANT CHANGE UP
TSH SERPL-MCNC: 5.57 UIU/ML — HIGH (ref 0.27–4.2)
UROBILINOGEN FLD QL: 1 MG/DL — SIGNIFICANT CHANGE UP (ref 0.2–1)
VARIANT LYMPHS # BLD: 4.2 % — SIGNIFICANT CHANGE UP (ref 0–6)
VARIANT LYMPHS NFR BLD MANUAL: 4.2 % — SIGNIFICANT CHANGE UP (ref 0–6)
VIT B12 SERPL-MCNC: 605 PG/ML — SIGNIFICANT CHANGE UP (ref 232–1245)
WBC # BLD: 10.27 K/UL — SIGNIFICANT CHANGE UP (ref 3.8–10.5)
WBC # BLD: 12.47 K/UL — HIGH (ref 3.8–10.5)
WBC # BLD: 6.86 K/UL — SIGNIFICANT CHANGE UP (ref 3.8–10.5)
WBC # BLD: 7.96 K/UL — SIGNIFICANT CHANGE UP (ref 3.8–10.5)
WBC # FLD AUTO: 10.27 K/UL — SIGNIFICANT CHANGE UP (ref 3.8–10.5)
WBC # FLD AUTO: 12.47 K/UL — HIGH (ref 3.8–10.5)
WBC # FLD AUTO: 6.86 K/UL — SIGNIFICANT CHANGE UP (ref 3.8–10.5)
WBC # FLD AUTO: 7.96 K/UL — SIGNIFICANT CHANGE UP (ref 3.8–10.5)
WBC UR QL: 1 /HPF — SIGNIFICANT CHANGE UP (ref 0–5)

## 2024-01-01 PROCEDURE — G0316 PROLONG INPT EVAL ADD15 M: CPT

## 2024-01-01 PROCEDURE — 71260 CT THORAX DX C+: CPT | Mod: 26,MC

## 2024-01-01 PROCEDURE — 99497 ADVNCD CARE PLAN 30 MIN: CPT | Mod: 25

## 2024-01-01 PROCEDURE — 99223 1ST HOSP IP/OBS HIGH 75: CPT

## 2024-01-01 PROCEDURE — 99232 SBSQ HOSP IP/OBS MODERATE 35: CPT

## 2024-01-01 PROCEDURE — 71045 X-RAY EXAM CHEST 1 VIEW: CPT | Mod: 26

## 2024-01-01 PROCEDURE — 76857 US EXAM PELVIC LIMITED: CPT | Mod: 26

## 2024-01-01 PROCEDURE — 99233 SBSQ HOSP IP/OBS HIGH 50: CPT

## 2024-01-01 PROCEDURE — 99285 EMERGENCY DEPT VISIT HI MDM: CPT

## 2024-01-01 PROCEDURE — 76770 US EXAM ABDO BACK WALL COMP: CPT | Mod: 26

## 2024-01-01 PROCEDURE — 74177 CT ABD & PELVIS W/CONTRAST: CPT | Mod: 26,MC

## 2024-01-01 PROCEDURE — 72125 CT NECK SPINE W/O DYE: CPT | Mod: 26,MC

## 2024-01-01 PROCEDURE — 70450 CT HEAD/BRAIN W/O DYE: CPT | Mod: 26,MC

## 2024-01-01 PROCEDURE — 70552 MRI BRAIN STEM W/DYE: CPT | Mod: 26

## 2024-01-01 PROCEDURE — 99239 HOSP IP/OBS DSCHRG MGMT >30: CPT

## 2024-01-01 RX ORDER — GLUCAGON 3 MG/1
1 POWDER NASAL ONCE
Refills: 0 | Status: DISCONTINUED | OUTPATIENT
Start: 2024-01-01 | End: 2024-01-01

## 2024-01-01 RX ORDER — SENNA 187 MG
2 TABLET ORAL AT BEDTIME
Refills: 0 | Status: DISCONTINUED | OUTPATIENT
Start: 2024-01-01 | End: 2024-01-01

## 2024-01-01 RX ORDER — QUETIAPINE FUMARATE 25 MG/1
50 TABLET ORAL AT BEDTIME
Refills: 0 | Status: DISCONTINUED | OUTPATIENT
Start: 2024-01-01 | End: 2024-01-01

## 2024-01-01 RX ORDER — MAGNESIUM, ALUMINUM HYDROXIDE 200-200 MG
30 TABLET,CHEWABLE ORAL EVERY 4 HOURS
Refills: 0 | Status: DISCONTINUED | OUTPATIENT
Start: 2024-01-01 | End: 2024-01-01

## 2024-01-01 RX ORDER — DEXTROSE 50 % IN WATER 50 %
25 SYRINGE (ML) INTRAVENOUS ONCE
Refills: 0 | Status: DISCONTINUED | OUTPATIENT
Start: 2024-01-01 | End: 2024-01-01

## 2024-01-01 RX ORDER — SODIUM CHLORIDE 9 G/1000ML
1000 INJECTION, SOLUTION INTRAVENOUS
Refills: 0 | Status: DISCONTINUED | OUTPATIENT
Start: 2024-01-01 | End: 2024-01-01

## 2024-01-01 RX ORDER — CEFEPIME 2 G/20ML
2000 INJECTION, POWDER, FOR SOLUTION INTRAVENOUS EVERY 8 HOURS
Refills: 0 | Status: DISCONTINUED | OUTPATIENT
Start: 2024-01-01 | End: 2024-01-01

## 2024-01-01 RX ORDER — ACETAMINOPHEN 500 MG/5ML
1000 LIQUID (ML) ORAL ONCE
Refills: 0 | Status: COMPLETED | OUTPATIENT
Start: 2024-01-01 | End: 2024-01-01

## 2024-01-01 RX ORDER — DEXTROSE 50 % IN WATER 50 %
12.5 SYRINGE (ML) INTRAVENOUS ONCE
Refills: 0 | Status: DISCONTINUED | OUTPATIENT
Start: 2024-01-01 | End: 2024-01-01

## 2024-01-01 RX ORDER — ONDANSETRON HCL/PF 4 MG/2 ML
4 VIAL (ML) INJECTION EVERY 8 HOURS
Refills: 0 | Status: DISCONTINUED | OUTPATIENT
Start: 2024-01-01 | End: 2024-01-01

## 2024-01-01 RX ORDER — QUETIAPINE FUMARATE 25 MG/1
1 TABLET ORAL
Qty: 0 | Refills: 0 | DISCHARGE
Start: 2024-01-01

## 2024-01-01 RX ORDER — MELATONIN 5 MG
3 TABLET ORAL AT BEDTIME
Refills: 0 | Status: DISCONTINUED | OUTPATIENT
Start: 2024-01-01 | End: 2024-01-01

## 2024-01-01 RX ORDER — POLYETHYLENE GLYCOL 3350 17 G/17G
17 POWDER, FOR SOLUTION ORAL DAILY
Refills: 0 | Status: DISCONTINUED | OUTPATIENT
Start: 2024-01-01 | End: 2024-01-01

## 2024-01-01 RX ORDER — QUETIAPINE FUMARATE 25 MG/1
25 TABLET ORAL ONCE
Refills: 0 | Status: COMPLETED | OUTPATIENT
Start: 2024-01-01 | End: 2024-01-01

## 2024-01-01 RX ORDER — SODIUM CHLORIDE 9 G/1000ML
1000 INJECTION, SOLUTION INTRAVENOUS ONCE
Refills: 0 | Status: COMPLETED | OUTPATIENT
Start: 2024-01-01 | End: 2024-01-01

## 2024-01-01 RX ORDER — CEFEPIME 2 G/20ML
2000 INJECTION, POWDER, FOR SOLUTION INTRAVENOUS EVERY 8 HOURS
Refills: 0 | Status: COMPLETED | OUTPATIENT
Start: 2024-01-01 | End: 2024-01-01

## 2024-01-01 RX ORDER — CEFPODOXIME PROXETIL 200 MG/1
200 TABLET, FILM COATED ORAL EVERY 12 HOURS
Refills: 0 | Status: DISCONTINUED | OUTPATIENT
Start: 2024-01-01 | End: 2024-01-01

## 2024-01-01 RX ORDER — QUETIAPINE FUMARATE 25 MG/1
25 TABLET ORAL
Refills: 0 | Status: DISCONTINUED | OUTPATIENT
Start: 2024-01-01 | End: 2024-01-01

## 2024-01-01 RX ORDER — SERTRALINE 100 MG/1
100 TABLET, FILM COATED ORAL DAILY
Refills: 0 | Status: DISCONTINUED | OUTPATIENT
Start: 2024-01-01 | End: 2024-01-01

## 2024-01-01 RX ORDER — SODIUM BICARBONATE 1 MEQ/ML
650 SYRINGE (ML) INTRAVENOUS
Refills: 0 | Status: DISCONTINUED | OUTPATIENT
Start: 2024-01-01 | End: 2024-01-01

## 2024-01-01 RX ORDER — ALPRAZOLAM 0.5 MG
0.25 TABLET, EXTENDED RELEASE 24 HR ORAL ONCE
Refills: 0 | Status: DISCONTINUED | OUTPATIENT
Start: 2024-01-01 | End: 2024-01-01

## 2024-01-01 RX ORDER — DEXTROSE 50 % IN WATER 50 %
15 SYRINGE (ML) INTRAVENOUS ONCE
Refills: 0 | Status: DISCONTINUED | OUTPATIENT
Start: 2024-01-01 | End: 2024-01-01

## 2024-01-01 RX ORDER — ASPIRIN 325 MG
81 TABLET ORAL DAILY
Refills: 0 | Status: DISCONTINUED | OUTPATIENT
Start: 2024-01-01 | End: 2024-01-01

## 2024-01-01 RX ORDER — CEFPODOXIME PROXETIL 200 MG/1
1 TABLET, FILM COATED ORAL
Qty: 6 | Refills: 0
Start: 2024-01-01 | End: 2024-01-01

## 2024-01-01 RX ORDER — ACETAMINOPHEN 500 MG/5ML
650 LIQUID (ML) ORAL EVERY 6 HOURS
Refills: 0 | Status: DISCONTINUED | OUTPATIENT
Start: 2024-01-01 | End: 2024-01-01

## 2024-01-01 RX ORDER — VANCOMYCIN HCL IN 5 % DEXTROSE 1.5G/250ML
1000 PLASTIC BAG, INJECTION (ML) INTRAVENOUS ONCE
Refills: 0 | Status: COMPLETED | OUTPATIENT
Start: 2024-01-01 | End: 2024-01-01

## 2024-01-01 RX ORDER — TAMSULOSIN HYDROCHLORIDE 0.4 MG/1
2 CAPSULE ORAL
Qty: 60 | Refills: 0
Start: 2024-01-01 | End: 2024-11-16

## 2024-01-01 RX ORDER — CEFEPIME 2 G/20ML
INJECTION, POWDER, FOR SOLUTION INTRAVENOUS
Refills: 0 | Status: DISCONTINUED | OUTPATIENT
Start: 2024-01-01 | End: 2024-01-01

## 2024-01-01 RX ORDER — QUETIAPINE FUMARATE 25 MG/1
25 TABLET ORAL AT BEDTIME
Refills: 0 | Status: DISCONTINUED | OUTPATIENT
Start: 2024-01-01 | End: 2024-01-01

## 2024-01-01 RX ORDER — DONEPEZIL HYDROCHLORIDE 5 MG/1
5 TABLET ORAL AT BEDTIME
Refills: 0 | Status: DISCONTINUED | OUTPATIENT
Start: 2024-01-01 | End: 2024-01-01

## 2024-01-01 RX ORDER — HALOPERIDOL 10 MG/1
0.5 TABLET ORAL EVERY 12 HOURS
Refills: 0 | Status: DISCONTINUED | OUTPATIENT
Start: 2024-01-01 | End: 2024-01-01

## 2024-01-01 RX ORDER — ENOXAPARIN SODIUM 100 MG/ML
40 INJECTION SUBCUTANEOUS EVERY 24 HOURS
Refills: 0 | Status: DISCONTINUED | OUTPATIENT
Start: 2024-01-01 | End: 2024-01-01

## 2024-01-01 RX ORDER — EZETIMIBE 10 MG/1
10 TABLET ORAL AT BEDTIME
Refills: 0 | Status: DISCONTINUED | OUTPATIENT
Start: 2024-01-01 | End: 2024-01-01

## 2024-01-01 RX ORDER — TAMSULOSIN HYDROCHLORIDE 0.4 MG/1
0.8 CAPSULE ORAL AT BEDTIME
Refills: 0 | Status: DISCONTINUED | OUTPATIENT
Start: 2024-01-01 | End: 2024-01-01

## 2024-01-01 RX ADMIN — Medication 650 MILLIGRAM(S): at 05:55

## 2024-01-01 RX ADMIN — POLYETHYLENE GLYCOL 3350 17 GRAM(S): 17 POWDER, FOR SOLUTION ORAL at 12:44

## 2024-01-01 RX ADMIN — CEFEPIME 2000 MILLIGRAM(S): 2 INJECTION, POWDER, FOR SOLUTION INTRAVENOUS at 16:48

## 2024-01-01 RX ADMIN — SERTRALINE 100 MILLIGRAM(S): 100 TABLET, FILM COATED ORAL at 12:43

## 2024-01-01 RX ADMIN — Medication 1 APPLICATION(S): at 16:47

## 2024-01-01 RX ADMIN — Medication 81 MILLIGRAM(S): at 12:12

## 2024-01-01 RX ADMIN — Medication 81 MILLIGRAM(S): at 11:26

## 2024-01-01 RX ADMIN — Medication 1 APPLICATION(S): at 05:30

## 2024-01-01 RX ADMIN — Medication 650 MILLIGRAM(S): at 12:26

## 2024-01-01 RX ADMIN — Medication 125 MILLIGRAM(S): at 06:44

## 2024-01-01 RX ADMIN — Medication 650 MILLIGRAM(S): at 22:35

## 2024-01-01 RX ADMIN — QUETIAPINE FUMARATE 25 MILLIGRAM(S): 25 TABLET ORAL at 22:27

## 2024-01-01 RX ADMIN — QUETIAPINE FUMARATE 50 MILLIGRAM(S): 25 TABLET ORAL at 22:49

## 2024-01-01 RX ADMIN — SERTRALINE 100 MILLIGRAM(S): 100 TABLET, FILM COATED ORAL at 12:27

## 2024-01-01 RX ADMIN — Medication 40 MILLIGRAM(S): at 12:43

## 2024-01-01 RX ADMIN — TAMSULOSIN HYDROCHLORIDE 0.8 MILLIGRAM(S): 0.4 CAPSULE ORAL at 22:48

## 2024-01-01 RX ADMIN — CEFEPIME 2000 MILLIGRAM(S): 2 INJECTION, POWDER, FOR SOLUTION INTRAVENOUS at 06:44

## 2024-01-01 RX ADMIN — SODIUM CHLORIDE 1000 MILLILITER(S): 9 INJECTION, SOLUTION INTRAVENOUS at 13:32

## 2024-01-01 RX ADMIN — Medication 650 MILLIGRAM(S): at 22:27

## 2024-01-01 RX ADMIN — EZETIMIBE 10 MILLIGRAM(S): 10 TABLET ORAL at 22:35

## 2024-01-01 RX ADMIN — CEFEPIME 2000 MILLIGRAM(S): 2 INJECTION, POWDER, FOR SOLUTION INTRAVENOUS at 22:35

## 2024-01-01 RX ADMIN — Medication 2 TABLET(S): at 22:35

## 2024-01-01 RX ADMIN — Medication 2 TABLET(S): at 23:01

## 2024-01-01 RX ADMIN — Medication 650 MILLIGRAM(S): at 23:10

## 2024-01-01 RX ADMIN — DONEPEZIL HYDROCHLORIDE 5 MILLIGRAM(S): 5 TABLET ORAL at 23:01

## 2024-01-01 RX ADMIN — ENOXAPARIN SODIUM 40 MILLIGRAM(S): 100 INJECTION SUBCUTANEOUS at 22:34

## 2024-01-01 RX ADMIN — Medication 650 MILLIGRAM(S): at 23:20

## 2024-01-01 RX ADMIN — Medication 40 MILLIEQUIVALENT(S): at 23:01

## 2024-01-01 RX ADMIN — QUETIAPINE FUMARATE 25 MILLIGRAM(S): 25 TABLET ORAL at 09:15

## 2024-01-01 RX ADMIN — EZETIMIBE 10 MILLIGRAM(S): 10 TABLET ORAL at 22:26

## 2024-01-01 RX ADMIN — Medication 81 MILLIGRAM(S): at 12:43

## 2024-01-01 RX ADMIN — EZETIMIBE 10 MILLIGRAM(S): 10 TABLET ORAL at 22:45

## 2024-01-01 RX ADMIN — QUETIAPINE FUMARATE 25 MILLIGRAM(S): 25 TABLET ORAL at 23:01

## 2024-01-01 RX ADMIN — Medication 125 MILLIGRAM(S): at 18:30

## 2024-01-01 RX ADMIN — Medication 125 MILLIGRAM(S): at 05:30

## 2024-01-01 RX ADMIN — CEFEPIME 2000 MILLIGRAM(S): 2 INJECTION, POWDER, FOR SOLUTION INTRAVENOUS at 13:31

## 2024-01-01 RX ADMIN — Medication 60 MILLILITER(S): at 16:49

## 2024-01-01 RX ADMIN — Medication 2 TABLET(S): at 22:26

## 2024-01-01 RX ADMIN — EZETIMIBE 10 MILLIGRAM(S): 10 TABLET ORAL at 22:48

## 2024-01-01 RX ADMIN — Medication 650 MILLIGRAM(S): at 11:26

## 2024-01-01 RX ADMIN — Medication 40 MILLIGRAM(S): at 11:26

## 2024-01-01 RX ADMIN — DONEPEZIL HYDROCHLORIDE 5 MILLIGRAM(S): 5 TABLET ORAL at 22:26

## 2024-01-01 RX ADMIN — QUETIAPINE FUMARATE 25 MILLIGRAM(S): 25 TABLET ORAL at 14:33

## 2024-01-01 RX ADMIN — SERTRALINE 100 MILLIGRAM(S): 100 TABLET, FILM COATED ORAL at 12:12

## 2024-01-01 RX ADMIN — Medication 650 MILLIGRAM(S): at 12:00

## 2024-01-01 RX ADMIN — POLYETHYLENE GLYCOL 3350 17 GRAM(S): 17 POWDER, FOR SOLUTION ORAL at 11:27

## 2024-01-01 RX ADMIN — SODIUM CHLORIDE 60 MILLILITER(S): 9 INJECTION, SOLUTION INTRAVENOUS at 12:12

## 2024-01-01 RX ADMIN — CEFEPIME 2000 MILLIGRAM(S): 2 INJECTION, POWDER, FOR SOLUTION INTRAVENOUS at 05:53

## 2024-01-01 RX ADMIN — Medication 650 MILLIGRAM(S): at 05:53

## 2024-01-01 RX ADMIN — Medication 250 MILLIGRAM(S): at 13:32

## 2024-01-01 RX ADMIN — TAMSULOSIN HYDROCHLORIDE 0.8 MILLIGRAM(S): 0.4 CAPSULE ORAL at 23:01

## 2024-01-01 RX ADMIN — Medication 1 APPLICATION(S): at 05:55

## 2024-01-01 RX ADMIN — Medication 1 APPLICATION(S): at 06:43

## 2024-01-01 RX ADMIN — TAMSULOSIN HYDROCHLORIDE 0.8 MILLIGRAM(S): 0.4 CAPSULE ORAL at 22:36

## 2024-01-01 RX ADMIN — ENOXAPARIN SODIUM 40 MILLIGRAM(S): 100 INJECTION SUBCUTANEOUS at 22:26

## 2024-01-01 RX ADMIN — Medication 650 MILLIGRAM(S): at 16:48

## 2024-01-01 RX ADMIN — POLYETHYLENE GLYCOL 3350 17 GRAM(S): 17 POWDER, FOR SOLUTION ORAL at 12:13

## 2024-01-01 RX ADMIN — Medication 125 MILLIGRAM(S): at 18:07

## 2024-01-01 RX ADMIN — Medication 2 TABLET(S): at 22:49

## 2024-01-01 RX ADMIN — ENOXAPARIN SODIUM 40 MILLIGRAM(S): 100 INJECTION SUBCUTANEOUS at 22:51

## 2024-01-01 RX ADMIN — QUETIAPINE FUMARATE 50 MILLIGRAM(S): 25 TABLET ORAL at 22:35

## 2024-01-01 RX ADMIN — Medication 0.25 MILLIGRAM(S): at 14:43

## 2024-01-01 RX ADMIN — TAMSULOSIN HYDROCHLORIDE 0.8 MILLIGRAM(S): 0.4 CAPSULE ORAL at 22:26

## 2024-01-01 RX ADMIN — CEFEPIME 2000 MILLIGRAM(S): 2 INJECTION, POWDER, FOR SOLUTION INTRAVENOUS at 22:27

## 2024-01-01 RX ADMIN — Medication 40 MILLIGRAM(S): at 05:31

## 2024-01-01 RX ADMIN — Medication 40 MILLIGRAM(S): at 12:13

## 2024-01-01 RX ADMIN — SODIUM CHLORIDE 84 MILLILITER(S): 9 INJECTION, SOLUTION INTRAVENOUS at 18:46

## 2024-01-01 RX ADMIN — CEFEPIME 2000 MILLIGRAM(S): 2 INJECTION, POWDER, FOR SOLUTION INTRAVENOUS at 22:56

## 2024-01-01 RX ADMIN — SERTRALINE 100 MILLIGRAM(S): 100 TABLET, FILM COATED ORAL at 11:26

## 2024-01-01 RX ADMIN — SODIUM CHLORIDE 1000 MILLILITER(S): 9 INJECTION, SOLUTION INTRAVENOUS at 14:15

## 2024-01-01 RX ADMIN — ENOXAPARIN SODIUM 40 MILLIGRAM(S): 100 INJECTION SUBCUTANEOUS at 22:56

## 2024-01-01 RX ADMIN — Medication 400 MILLIGRAM(S): at 14:43

## 2024-01-16 ENCOUNTER — APPOINTMENT (OUTPATIENT)
Dept: GERIATRICS | Facility: CLINIC | Age: 74
End: 2024-01-16

## 2024-01-18 ENCOUNTER — APPOINTMENT (OUTPATIENT)
Dept: INTERNAL MEDICINE | Facility: CLINIC | Age: 74
End: 2024-01-18
Payer: MEDICARE

## 2024-01-18 VITALS
RESPIRATION RATE: 14 BRPM | SYSTOLIC BLOOD PRESSURE: 134 MMHG | BODY MASS INDEX: 28.63 KG/M2 | HEART RATE: 81 BPM | OXYGEN SATURATION: 94 % | DIASTOLIC BLOOD PRESSURE: 80 MMHG | HEIGHT: 70 IN | WEIGHT: 200 LBS

## 2024-01-18 DIAGNOSIS — N18.31 CHRONIC KIDNEY DISEASE, STAGE 3A: ICD-10-CM

## 2024-01-18 DIAGNOSIS — D64.9 ANEMIA, UNSPECIFIED: ICD-10-CM

## 2024-01-18 PROCEDURE — 36415 COLL VENOUS BLD VENIPUNCTURE: CPT

## 2024-01-18 PROCEDURE — 99213 OFFICE O/P EST LOW 20 MIN: CPT

## 2024-01-18 PROCEDURE — G2211 COMPLEX E/M VISIT ADD ON: CPT

## 2024-01-20 LAB
ANION GAP SERPL CALC-SCNC: 13 MMOL/L
BASOPHILS # BLD AUTO: 0.06 K/UL
BASOPHILS NFR BLD AUTO: 0.6 %
BUN SERPL-MCNC: 35 MG/DL
CALCIUM SERPL-MCNC: 9 MG/DL
CHLORIDE SERPL-SCNC: 109 MMOL/L
CO2 SERPL-SCNC: 19 MMOL/L
CREAT SERPL-MCNC: 1.93 MG/DL
EGFR: 36 ML/MIN/1.73M2
EOSINOPHIL # BLD AUTO: 0.26 K/UL
EOSINOPHIL NFR BLD AUTO: 2.7 %
GLUCOSE SERPL-MCNC: 88 MG/DL
HCT VFR BLD CALC: 43.5 %
HGB BLD-MCNC: 14.7 G/DL
IMM GRANULOCYTES NFR BLD AUTO: 0.3 %
LYMPHOCYTES # BLD AUTO: 2.08 K/UL
LYMPHOCYTES NFR BLD AUTO: 21.5 %
MAN DIFF?: NORMAL
MCHC RBC-ENTMCNC: 33.8 GM/DL
MCHC RBC-ENTMCNC: 33.9 PG
MCV RBC AUTO: 100.2 FL
MONOCYTES # BLD AUTO: 1.03 K/UL
MONOCYTES NFR BLD AUTO: 10.7 %
NEUTROPHILS # BLD AUTO: 6.2 K/UL
NEUTROPHILS NFR BLD AUTO: 64.2 %
PLATELET # BLD AUTO: 346 K/UL
POTASSIUM SERPL-SCNC: 4.4 MMOL/L
PSA SERPL-MCNC: 5.44 NG/ML
RBC # BLD: 4.34 M/UL
RBC # FLD: 13 %
SODIUM SERPL-SCNC: 141 MMOL/L
WBC # FLD AUTO: 9.66 K/UL

## 2024-01-20 NOTE — ADDENDUM
[FreeTextEntry1] : Labs good other than creatinine continues increased and higher therefore increase water daily and follow-up with nephrology. PSA continues increased to 5.4 therefore follow-up with urology. Chronic mild decreased CO2 likely secondary to CKD.

## 2024-01-20 NOTE — ASSESSMENT
[FreeTextEntry1] : Patient with progressive dementia status post recent hospitalization for decline in status but has improved significantly since then. Continue present treatment. CKD with anemia and recent PSA increased will be checked with today's blood work. Follow-up in 3 months.

## 2024-01-20 NOTE — HISTORY OF PRESENT ILLNESS
[de-identified] : OLD NOTE: Brief Hospital Course: The patient presents with his wife after admission to [facility] where 72 y/o male with hx of Alzheimer dementia, HTN, CKD-3 with baseline Cr. of 1.5, PH with known elevated PSA of 5, chronic LE lymphedema presented to ED on November 30, 2023 w/ Wife c/o difficulty urinating for past 24 hours. History obtained from Wife based on Patient being a poor historian due to Dementia. Patient saw PMD on 11/29 c/o URI symptoms as well as loose stool since 11/24. No reports of sick contacts, travel, or medication changes. Wife reports Patient normally is independent but does at time fall with no reported injuries. Patient was given Zithromax-took 1 dose, Tessalon pearls and told to hydrate. He was called laterin the day and notified he was enterovirus pos. and told to stop Zithromax and cont. Tessalon pearls prn. Wife denies any sputum production, blood/mucus in loose stool. No reports of rash, focal weakness, HA, or CP.  In the ED Initial vitals stable, c/o inability to urinate and bladder scan with around 300ml. Toscano placed w/o incident and clear urine noted in bag. Wife notes this happened about a year ago when they were vacationing in [place] and he was seen in a Hospital there and dcd with a toscano and leg bag and has been following with a private Urologist who did a TOV and put him on Flomax and has been following him for the elevated PSA as well. Noted to have a renal U/S in 7/23 due to elevated Cr. which noted a left atrophic kidney w/o hemodynamically sig. stenosis. Patient noted to have elevated Cr. of 2.1 on 11/27 and was supposed to see Nephrology in near future. Per Wife he has been on the same BP meds and due to being ill has not been drinking and eating as much as normal over the last week. Cr. here 1.9 with UA showing elevated SG otherwise without evidence of infection. WBC 11.3 without left shift. CXR with poor insp. effort w/o overt infiltrate. No other complaints noted by  Pt is started on IVF and lisinopril was held , 1:1 is in place with PT consulted. Daily labs CR and lytes being monitored, Also had toscano inserted on admission for retention PTt.is improving clinically , PT evaluated pt ambulates with Walker , intermittent cough CXR repeated no infiltrate Pt's toscano removed voiding , renal sono showed medical disease without hydronephrosis or obstruction.  Blood pressure mildly elevated therefore amlodipine started with lisinopril discontinued after discussion with nephrology. Clinically improved and stable for discharge on December 5, 2023 to home with home care  NEW NOTE: Patient continues to do well since being discharged being more interactive and less agitated and more alert. Patient's wife continues to be an excellent caretaker and assists patient with all needs but patient able to do more on his own. Current medications continue.

## 2024-01-22 ENCOUNTER — TRANSCRIPTION ENCOUNTER (OUTPATIENT)
Age: 74
End: 2024-01-22

## 2024-01-22 ENCOUNTER — APPOINTMENT (OUTPATIENT)
Dept: GERIATRICS | Facility: CLINIC | Age: 74
End: 2024-01-22

## 2024-01-29 ENCOUNTER — NON-APPOINTMENT (OUTPATIENT)
Age: 74
End: 2024-01-29

## 2024-02-05 ENCOUNTER — APPOINTMENT (OUTPATIENT)
Dept: GERIATRICS | Facility: CLINIC | Age: 74
End: 2024-02-05
Payer: MEDICARE

## 2024-02-05 ENCOUNTER — NON-APPOINTMENT (OUTPATIENT)
Age: 74
End: 2024-02-05

## 2024-02-05 VITALS
SYSTOLIC BLOOD PRESSURE: 108 MMHG | OXYGEN SATURATION: 98 % | WEIGHT: 202 LBS | DIASTOLIC BLOOD PRESSURE: 67 MMHG | HEART RATE: 75 BPM | BODY MASS INDEX: 28.98 KG/M2 | RESPIRATION RATE: 14 BRPM | TEMPERATURE: 97.8 F

## 2024-02-05 DIAGNOSIS — Z13.29 ENCOUNTER FOR SCREENING FOR OTHER SUSPECTED ENDOCRINE DISORDER: ICD-10-CM

## 2024-02-05 DIAGNOSIS — Z71.89 OTHER SPECIFIED COUNSELING: ICD-10-CM

## 2024-02-05 DIAGNOSIS — R21 RASH AND OTHER NONSPECIFIC SKIN ERUPTION: ICD-10-CM

## 2024-02-05 DIAGNOSIS — E55.9 VITAMIN D DEFICIENCY, UNSPECIFIED: ICD-10-CM

## 2024-02-05 PROCEDURE — G2212 PROLONG OUTPT/OFFICE VIS: CPT

## 2024-02-05 PROCEDURE — 99483 ASSMT & CARE PLN PT COG IMP: CPT

## 2024-02-05 RX ORDER — HYDROCORTISONE 25 MG/G
2.5 CREAM TOPICAL TWICE DAILY
Qty: 1 | Refills: 1 | Status: ACTIVE | COMMUNITY
Start: 2024-02-05 | End: 1900-01-01

## 2024-02-06 LAB
25(OH)D3 SERPL-MCNC: 38.5 NG/ML
ALBUMIN SERPL ELPH-MCNC: 4.5 G/DL
ALP BLD-CCNC: 91 U/L
ALT SERPL-CCNC: 23 U/L
ANION GAP SERPL CALC-SCNC: 18 MMOL/L
AST SERPL-CCNC: 20 U/L
BASOPHILS # BLD AUTO: 0.06 K/UL
BASOPHILS NFR BLD AUTO: 0.8 %
BILIRUB SERPL-MCNC: 0.4 MG/DL
BUN SERPL-MCNC: 49 MG/DL
CALCIUM SERPL-MCNC: 8.8 MG/DL
CHLORIDE SERPL-SCNC: 107 MMOL/L
CHOLEST SERPL-MCNC: 173 MG/DL
CO2 SERPL-SCNC: 14 MMOL/L
CREAT SERPL-MCNC: 1.76 MG/DL
EGFR: 40 ML/MIN/1.73M2
EOSINOPHIL # BLD AUTO: 0.24 K/UL
EOSINOPHIL NFR BLD AUTO: 3.1 %
FOLATE SERPL-MCNC: 16.9 NG/ML
GLUCOSE SERPL-MCNC: 77 MG/DL
HCT VFR BLD CALC: 42 %
HDLC SERPL-MCNC: 66 MG/DL
HGB BLD-MCNC: 13.5 G/DL
IMM GRANULOCYTES NFR BLD AUTO: 0.3 %
LDLC SERPL CALC-MCNC: 88 MG/DL
LYMPHOCYTES # BLD AUTO: 1.36 K/UL
LYMPHOCYTES NFR BLD AUTO: 17.8 %
MAN DIFF?: NORMAL
MCHC RBC-ENTMCNC: 32.1 GM/DL
MCHC RBC-ENTMCNC: 32.4 PG
MCV RBC AUTO: 100.7 FL
MONOCYTES # BLD AUTO: 0.74 K/UL
MONOCYTES NFR BLD AUTO: 9.7 %
NEUTROPHILS # BLD AUTO: 5.21 K/UL
NEUTROPHILS NFR BLD AUTO: 68.3 %
NONHDLC SERPL-MCNC: 107 MG/DL
PLATELET # BLD AUTO: 292 K/UL
POTASSIUM SERPL-SCNC: 5.2 MMOL/L
PROT SERPL-MCNC: 6.6 G/DL
RBC # BLD: 4.17 M/UL
RBC # FLD: 12.5 %
SODIUM SERPL-SCNC: 139 MMOL/L
TRIGL SERPL-MCNC: 112 MG/DL
TSH SERPL-ACNC: 1.65 UIU/ML
VIT B12 SERPL-MCNC: 678 PG/ML
WBC # FLD AUTO: 7.63 K/UL

## 2024-02-08 ENCOUNTER — APPOINTMENT (OUTPATIENT)
Dept: UROLOGY | Facility: CLINIC | Age: 74
End: 2024-02-08
Payer: MEDICARE

## 2024-02-08 VITALS
BODY MASS INDEX: 28.92 KG/M2 | DIASTOLIC BLOOD PRESSURE: 63 MMHG | HEIGHT: 70 IN | WEIGHT: 202 LBS | SYSTOLIC BLOOD PRESSURE: 93 MMHG

## 2024-02-08 PROCEDURE — 51798 US URINE CAPACITY MEASURE: CPT

## 2024-02-08 PROCEDURE — 99212 OFFICE O/P EST SF 10 MIN: CPT

## 2024-02-21 ENCOUNTER — NON-APPOINTMENT (OUTPATIENT)
Age: 74
End: 2024-02-21

## 2024-02-24 NOTE — HISTORY OF PRESENT ILLNESS
[FreeTextEntry1] : Seen in hospital:  hx of Alzheimer dementia, HTN, CKD-3 with baseline Cr. of 1.5, PH with known elevated PSA of 5, chronic LE lymphedema. Patient presented to ED w/ Wife c/o difficulty urinating for past 24 hours. History obtained from Wife based on Patient being a poor historian due to Dementia. Patient saw PMD on 11/29 c/o URI symptoms as well as loose stool since 11/24. No reports of sick contacts, travel, or medication changes. Wife reports Patient normally is independent but does at time fall with no reported injuries. Patient was given Zithromax-took 1 dose, Tessalon pearls and told to hydrate. He was called later in the day and notified he was enterovirus pos. and told to stop Zithromax and cont. Tessalon pearls prn. Wife denies any sputum production, blood/mucus in loose stool. No reports of rash, focal weakness, HA, or CP. In the ED Initial vitals stable, c/o inability to urinate and bladder scan with around 300ml. Toscano placed w/o incident and clear urine noted in bag. Wife notes this happened about a year ago when they were vacationing in [place] and he was seen in a Hospital there and dcd with a toscano and leg bag and has been following with a private Urologist who did a TOV and put him on Flomax and has been following him for the elevated PSA as well [Urinary Retention] : no urinary retention [Hematuria - Gross] : no gross hematuria [None] : None

## 2024-02-26 ENCOUNTER — NON-APPOINTMENT (OUTPATIENT)
Age: 74
End: 2024-02-26

## 2024-02-29 ENCOUNTER — OFFICE (OUTPATIENT)
Dept: URBAN - METROPOLITAN AREA CLINIC 104 | Facility: CLINIC | Age: 74
Setting detail: OPHTHALMOLOGY
End: 2024-02-29
Payer: MEDICARE

## 2024-02-29 ENCOUNTER — NON-APPOINTMENT (OUTPATIENT)
Age: 74
End: 2024-02-29

## 2024-02-29 DIAGNOSIS — H25.13: ICD-10-CM

## 2024-02-29 DIAGNOSIS — H10.45: ICD-10-CM

## 2024-02-29 DIAGNOSIS — H25.813: ICD-10-CM

## 2024-02-29 DIAGNOSIS — H16.223: ICD-10-CM

## 2024-02-29 PROCEDURE — 99203 OFFICE O/P NEW LOW 30 MIN: CPT | Performed by: OPTOMETRIST

## 2024-02-29 ASSESSMENT — CONFRONTATIONAL VISUAL FIELD TEST (CVF)
OD_FINDINGS: FULL
OS_FINDINGS: FULL

## 2024-03-05 ENCOUNTER — APPOINTMENT (OUTPATIENT)
Dept: GERIATRICS | Facility: CLINIC | Age: 74
End: 2024-03-05
Payer: MEDICARE

## 2024-03-05 PROCEDURE — 99443: CPT | Mod: 93

## 2024-03-07 ENCOUNTER — OFFICE (OUTPATIENT)
Dept: URBAN - METROPOLITAN AREA CLINIC 104 | Facility: CLINIC | Age: 74
Setting detail: OPHTHALMOLOGY
End: 2024-03-07
Payer: MEDICARE

## 2024-03-07 ENCOUNTER — NON-APPOINTMENT (OUTPATIENT)
Age: 74
End: 2024-03-07

## 2024-03-07 DIAGNOSIS — H43.813: ICD-10-CM

## 2024-03-07 DIAGNOSIS — H16.223: ICD-10-CM

## 2024-03-07 DIAGNOSIS — H25.813: ICD-10-CM

## 2024-03-07 DIAGNOSIS — H10.45: ICD-10-CM

## 2024-03-07 DIAGNOSIS — H25.13: ICD-10-CM

## 2024-03-07 PROCEDURE — 92014 COMPRE OPH EXAM EST PT 1/>: CPT | Performed by: OPTOMETRIST

## 2024-03-12 ENCOUNTER — RX RENEWAL (OUTPATIENT)
Age: 74
End: 2024-03-12

## 2024-03-12 RX ORDER — MEMANTINE HYDROCHLORIDE 10 MG/1
10 TABLET, FILM COATED ORAL
Qty: 60 | Refills: 3 | Status: ACTIVE | COMMUNITY
Start: 2023-11-06 | End: 1900-01-01

## 2024-04-04 ENCOUNTER — RX RENEWAL (OUTPATIENT)
Age: 74
End: 2024-04-04

## 2024-04-06 ENCOUNTER — RX RENEWAL (OUTPATIENT)
Age: 74
End: 2024-04-06

## 2024-04-06 RX ORDER — EZETIMIBE 10 MG/1
10 TABLET ORAL DAILY
Qty: 90 | Refills: 1 | Status: ACTIVE | COMMUNITY
Start: 2021-03-01 | End: 1900-01-01

## 2024-04-09 ENCOUNTER — NON-APPOINTMENT (OUTPATIENT)
Age: 74
End: 2024-04-09

## 2024-04-10 ENCOUNTER — NON-APPOINTMENT (OUTPATIENT)
Age: 74
End: 2024-04-10

## 2024-04-10 ENCOUNTER — APPOINTMENT (OUTPATIENT)
Dept: UROLOGY | Facility: CLINIC | Age: 74
End: 2024-04-10
Payer: MEDICARE

## 2024-04-10 VITALS
HEIGHT: 70 IN | WEIGHT: 200 LBS | SYSTOLIC BLOOD PRESSURE: 98 MMHG | HEART RATE: 92 BPM | DIASTOLIC BLOOD PRESSURE: 62 MMHG | BODY MASS INDEX: 28.63 KG/M2

## 2024-04-10 DIAGNOSIS — R33.9 RETENTION OF URINE, UNSPECIFIED: ICD-10-CM

## 2024-04-10 DIAGNOSIS — R97.20 ELEVATED PROSTATE, SPECIFIC ANTIGEN [PSA]: ICD-10-CM

## 2024-04-10 PROCEDURE — 99213 OFFICE O/P EST LOW 20 MIN: CPT

## 2024-04-10 PROCEDURE — 51798 US URINE CAPACITY MEASURE: CPT

## 2024-04-11 ENCOUNTER — RX RENEWAL (OUTPATIENT)
Age: 74
End: 2024-04-11

## 2024-04-16 ENCOUNTER — APPOINTMENT (OUTPATIENT)
Dept: INTERNAL MEDICINE | Facility: CLINIC | Age: 74
End: 2024-04-16

## 2024-04-20 ENCOUNTER — RX RENEWAL (OUTPATIENT)
Age: 74
End: 2024-04-20

## 2024-04-20 RX ORDER — TAMSULOSIN HYDROCHLORIDE 0.4 MG/1
0.4 CAPSULE ORAL
Qty: 180 | Refills: 1 | Status: ACTIVE | COMMUNITY
Start: 2021-12-17 | End: 1900-01-01

## 2024-04-25 ENCOUNTER — APPOINTMENT (OUTPATIENT)
Dept: DERMATOLOGY | Facility: CLINIC | Age: 74
End: 2024-04-25
Payer: MEDICARE

## 2024-04-25 PROCEDURE — 17000 DESTRUCT PREMALG LESION: CPT

## 2024-04-25 PROCEDURE — 99214 OFFICE O/P EST MOD 30 MIN: CPT | Mod: 25

## 2024-04-28 PROBLEM — R33.9 URINARY RETENTION: Status: ACTIVE | Noted: 2023-12-12

## 2024-04-28 PROBLEM — R97.20 ELEVATED PSA: Status: ACTIVE | Noted: 2023-05-04

## 2024-04-28 NOTE — ASSESSMENT
[FreeTextEntry1] : PVR here 4cc, psa discussed.  Advise timed voiding if possible, f/u PSA, urine studies

## 2024-04-28 NOTE — PHYSICAL EXAM
[General Appearance - Well Developed] : well developed [General Appearance - Well Nourished] : well nourished [General Appearance - In No Acute Distress] : no acute distress [] : no respiratory distress [Not Anxious] : not anxious

## 2024-05-06 ENCOUNTER — APPOINTMENT (OUTPATIENT)
Dept: GERIATRICS | Facility: CLINIC | Age: 74
End: 2024-05-06
Payer: MEDICARE

## 2024-05-06 DIAGNOSIS — R39.11 HESITANCY OF MICTURITION: ICD-10-CM

## 2024-05-06 DIAGNOSIS — Z91.81 HISTORY OF FALLING: ICD-10-CM

## 2024-05-06 DIAGNOSIS — F03.911 UNSPECIFIED DEMENTIA, UNSPECIFIED SEVERITY, WITH AGITATION: ICD-10-CM

## 2024-05-06 DIAGNOSIS — G30.1 ALZHEIMER'S DISEASE WITH LATE ONSET: ICD-10-CM

## 2024-05-06 DIAGNOSIS — R26.0 ATAXIC GAIT: ICD-10-CM

## 2024-05-06 DIAGNOSIS — F41.8 OTHER SPECIFIED ANXIETY DISORDERS: ICD-10-CM

## 2024-05-06 DIAGNOSIS — F02.818 ALZHEIMER'S DISEASE WITH LATE ONSET: ICD-10-CM

## 2024-05-06 DIAGNOSIS — Z63.6 DEPENDENT RELATIVE NEEDING CARE AT HOME: ICD-10-CM

## 2024-05-06 PROCEDURE — G2211 COMPLEX E/M VISIT ADD ON: CPT | Mod: NC

## 2024-05-06 PROCEDURE — 99443: CPT | Mod: 93

## 2024-05-06 RX ORDER — QUETIAPINE FUMARATE 25 MG/1
25 TABLET ORAL
Qty: 60 | Refills: 3 | Status: ACTIVE | COMMUNITY
Start: 2023-07-12 | End: 1900-01-01

## 2024-05-06 SDOH — SOCIAL STABILITY - SOCIAL INSECURITY: DEPENDENT RELATIVE NEEDING CARE AT HOME: Z63.6

## 2024-05-08 ENCOUNTER — NON-APPOINTMENT (OUTPATIENT)
Age: 74
End: 2024-05-08

## 2024-05-09 ENCOUNTER — INPATIENT (INPATIENT)
Facility: HOSPITAL | Age: 74
LOS: 4 days | Discharge: NURSING, REHAB OR LONG-TERM CARE | DRG: 641 | End: 2024-05-14
Attending: STUDENT IN AN ORGANIZED HEALTH CARE EDUCATION/TRAINING PROGRAM | Admitting: FAMILY MEDICINE
Payer: MEDICARE

## 2024-05-09 VITALS
WEIGHT: 208.12 LBS | RESPIRATION RATE: 16 BRPM | HEART RATE: 88 BPM | OXYGEN SATURATION: 97 % | TEMPERATURE: 98 F | HEIGHT: 69 IN

## 2024-05-09 DIAGNOSIS — N18.30 CHRONIC KIDNEY DISEASE, STAGE 3 UNSPECIFIED: ICD-10-CM

## 2024-05-09 DIAGNOSIS — R62.7 ADULT FAILURE TO THRIVE: ICD-10-CM

## 2024-05-09 DIAGNOSIS — I10 ESSENTIAL (PRIMARY) HYPERTENSION: ICD-10-CM

## 2024-05-09 DIAGNOSIS — Z87.898 PERSONAL HISTORY OF OTHER SPECIFIED CONDITIONS: ICD-10-CM

## 2024-05-09 DIAGNOSIS — F03.90 UNSPECIFIED DEMENTIA WITHOUT BEHAVIORAL DISTURBANCE: ICD-10-CM

## 2024-05-09 DIAGNOSIS — Z98.890 OTHER SPECIFIED POSTPROCEDURAL STATES: Chronic | ICD-10-CM

## 2024-05-09 LAB
ALBUMIN SERPL ELPH-MCNC: 4 G/DL — SIGNIFICANT CHANGE UP (ref 3.3–5.2)
ALP SERPL-CCNC: 75 U/L — SIGNIFICANT CHANGE UP (ref 40–120)
ALT FLD-CCNC: 29 U/L — SIGNIFICANT CHANGE UP
ANION GAP SERPL CALC-SCNC: 11 MMOL/L — SIGNIFICANT CHANGE UP (ref 5–17)
APPEARANCE UR: CLEAR — SIGNIFICANT CHANGE UP
APTT BLD: 30.8 SEC — SIGNIFICANT CHANGE UP (ref 24.5–35.6)
AST SERPL-CCNC: 17 U/L — SIGNIFICANT CHANGE UP
BASOPHILS # BLD AUTO: 0.04 K/UL — SIGNIFICANT CHANGE UP (ref 0–0.2)
BASOPHILS NFR BLD AUTO: 0.6 % — SIGNIFICANT CHANGE UP (ref 0–2)
BILIRUB SERPL-MCNC: 0.4 MG/DL — SIGNIFICANT CHANGE UP (ref 0.4–2)
BILIRUB UR-MCNC: NEGATIVE — SIGNIFICANT CHANGE UP
BUN SERPL-MCNC: 47.3 MG/DL — HIGH (ref 8–20)
CALCIUM SERPL-MCNC: 8.9 MG/DL — SIGNIFICANT CHANGE UP (ref 8.4–10.5)
CHLORIDE SERPL-SCNC: 108 MMOL/L — SIGNIFICANT CHANGE UP (ref 96–108)
CO2 SERPL-SCNC: 20 MMOL/L — LOW (ref 22–29)
COLOR SPEC: YELLOW — SIGNIFICANT CHANGE UP
CREAT SERPL-MCNC: 1.75 MG/DL — HIGH (ref 0.5–1.3)
DIFF PNL FLD: NEGATIVE — SIGNIFICANT CHANGE UP
EGFR: 41 ML/MIN/1.73M2 — LOW
EOSINOPHIL # BLD AUTO: 0.19 K/UL — SIGNIFICANT CHANGE UP (ref 0–0.5)
EOSINOPHIL NFR BLD AUTO: 2.7 % — SIGNIFICANT CHANGE UP (ref 0–6)
FLUAV AG NPH QL: SIGNIFICANT CHANGE UP
FLUBV AG NPH QL: SIGNIFICANT CHANGE UP
GLUCOSE SERPL-MCNC: 111 MG/DL — HIGH (ref 70–99)
GLUCOSE UR QL: NEGATIVE MG/DL — SIGNIFICANT CHANGE UP
HCT VFR BLD CALC: 38.9 % — LOW (ref 39–50)
HGB BLD-MCNC: 13.1 G/DL — SIGNIFICANT CHANGE UP (ref 13–17)
IMM GRANULOCYTES NFR BLD AUTO: 0.3 % — SIGNIFICANT CHANGE UP (ref 0–0.9)
INR BLD: 1.02 RATIO — SIGNIFICANT CHANGE UP (ref 0.85–1.18)
KETONES UR-MCNC: NEGATIVE MG/DL — SIGNIFICANT CHANGE UP
LEUKOCYTE ESTERASE UR-ACNC: NEGATIVE — SIGNIFICANT CHANGE UP
LYMPHOCYTES # BLD AUTO: 1.19 K/UL — SIGNIFICANT CHANGE UP (ref 1–3.3)
LYMPHOCYTES # BLD AUTO: 17.1 % — SIGNIFICANT CHANGE UP (ref 13–44)
MCHC RBC-ENTMCNC: 31.9 PG — SIGNIFICANT CHANGE UP (ref 27–34)
MCHC RBC-ENTMCNC: 33.7 GM/DL — SIGNIFICANT CHANGE UP (ref 32–36)
MCV RBC AUTO: 94.6 FL — SIGNIFICANT CHANGE UP (ref 80–100)
MONOCYTES # BLD AUTO: 0.77 K/UL — SIGNIFICANT CHANGE UP (ref 0–0.9)
MONOCYTES NFR BLD AUTO: 11.1 % — SIGNIFICANT CHANGE UP (ref 2–14)
NEUTROPHILS # BLD AUTO: 4.74 K/UL — SIGNIFICANT CHANGE UP (ref 1.8–7.4)
NEUTROPHILS NFR BLD AUTO: 68.2 % — SIGNIFICANT CHANGE UP (ref 43–77)
NITRITE UR-MCNC: NEGATIVE — SIGNIFICANT CHANGE UP
NT-PROBNP SERPL-SCNC: 59 PG/ML — SIGNIFICANT CHANGE UP (ref 0–300)
PH UR: 5.5 — SIGNIFICANT CHANGE UP (ref 5–8)
PLATELET # BLD AUTO: 254 K/UL — SIGNIFICANT CHANGE UP (ref 150–400)
POTASSIUM SERPL-MCNC: 4.6 MMOL/L — SIGNIFICANT CHANGE UP (ref 3.5–5.3)
POTASSIUM SERPL-SCNC: 4.6 MMOL/L — SIGNIFICANT CHANGE UP (ref 3.5–5.3)
PROT SERPL-MCNC: 6.3 G/DL — LOW (ref 6.6–8.7)
PROT UR-MCNC: NEGATIVE MG/DL — SIGNIFICANT CHANGE UP
PROTHROM AB SERPL-ACNC: 11.3 SEC — SIGNIFICANT CHANGE UP (ref 9.5–13)
RBC # BLD: 4.11 M/UL — LOW (ref 4.2–5.8)
RBC # FLD: 12.3 % — SIGNIFICANT CHANGE UP (ref 10.3–14.5)
RSV RNA NPH QL NAA+NON-PROBE: SIGNIFICANT CHANGE UP
SARS-COV-2 RNA SPEC QL NAA+PROBE: SIGNIFICANT CHANGE UP
SODIUM SERPL-SCNC: 139 MMOL/L — SIGNIFICANT CHANGE UP (ref 135–145)
SP GR SPEC: 1.01 — SIGNIFICANT CHANGE UP (ref 1–1.03)
UROBILINOGEN FLD QL: 0.2 MG/DL — SIGNIFICANT CHANGE UP (ref 0.2–1)
WBC # BLD: 6.95 K/UL — SIGNIFICANT CHANGE UP (ref 3.8–10.5)
WBC # FLD AUTO: 6.95 K/UL — SIGNIFICANT CHANGE UP (ref 3.8–10.5)

## 2024-05-09 PROCEDURE — 99223 1ST HOSP IP/OBS HIGH 75: CPT

## 2024-05-09 PROCEDURE — 72125 CT NECK SPINE W/O DYE: CPT | Mod: 26

## 2024-05-09 PROCEDURE — 93970 EXTREMITY STUDY: CPT | Mod: 26

## 2024-05-09 PROCEDURE — 70450 CT HEAD/BRAIN W/O DYE: CPT | Mod: 26

## 2024-05-09 PROCEDURE — 99285 EMERGENCY DEPT VISIT HI MDM: CPT | Mod: GC

## 2024-05-09 PROCEDURE — 71045 X-RAY EXAM CHEST 1 VIEW: CPT | Mod: 26

## 2024-05-09 RX ORDER — EZETIMIBE 10 MG/1
1 TABLET ORAL
Refills: 0 | DISCHARGE

## 2024-05-09 RX ORDER — OLANZAPINE 15 MG/1
5 TABLET, FILM COATED ORAL ONCE
Refills: 0 | Status: DISCONTINUED | OUTPATIENT
Start: 2024-05-09 | End: 2024-05-09

## 2024-05-09 RX ORDER — HEPARIN SODIUM 5000 [USP'U]/ML
5000 INJECTION INTRAVENOUS; SUBCUTANEOUS EVERY 12 HOURS
Refills: 0 | Status: DISCONTINUED | OUTPATIENT
Start: 2024-05-09 | End: 2024-05-14

## 2024-05-09 RX ORDER — ACETAMINOPHEN 500 MG
650 TABLET ORAL EVERY 6 HOURS
Refills: 0 | Status: DISCONTINUED | OUTPATIENT
Start: 2024-05-09 | End: 2024-05-14

## 2024-05-09 RX ORDER — LANOLIN ALCOHOL/MO/W.PET/CERES
3 CREAM (GRAM) TOPICAL AT BEDTIME
Refills: 0 | Status: DISCONTINUED | OUTPATIENT
Start: 2024-05-09 | End: 2024-05-14

## 2024-05-09 RX ORDER — ONDANSETRON 8 MG/1
4 TABLET, FILM COATED ORAL EVERY 8 HOURS
Refills: 0 | Status: DISCONTINUED | OUTPATIENT
Start: 2024-05-09 | End: 2024-05-14

## 2024-05-09 RX ORDER — QUETIAPINE FUMARATE 200 MG/1
12.5 TABLET, FILM COATED ORAL DAILY
Refills: 0 | Status: DISCONTINUED | OUTPATIENT
Start: 2024-05-09 | End: 2024-05-14

## 2024-05-09 RX ORDER — HYDRALAZINE HCL 50 MG
5 TABLET ORAL EVERY 6 HOURS
Refills: 0 | Status: DISCONTINUED | OUTPATIENT
Start: 2024-05-09 | End: 2024-05-14

## 2024-05-09 RX ORDER — SERTRALINE 25 MG/1
100 TABLET, FILM COATED ORAL DAILY
Refills: 0 | Status: DISCONTINUED | OUTPATIENT
Start: 2024-05-09 | End: 2024-05-14

## 2024-05-09 RX ORDER — SODIUM CHLORIDE 9 MG/ML
1000 INJECTION, SOLUTION INTRAVENOUS
Refills: 0 | Status: DISCONTINUED | OUTPATIENT
Start: 2024-05-09 | End: 2024-05-10

## 2024-05-09 RX ORDER — ASPIRIN/CALCIUM CARB/MAGNESIUM 324 MG
1 TABLET ORAL
Qty: 0 | Refills: 0 | DISCHARGE

## 2024-05-09 RX ORDER — TAMSULOSIN HYDROCHLORIDE 0.4 MG/1
0.8 CAPSULE ORAL AT BEDTIME
Refills: 0 | Status: DISCONTINUED | OUTPATIENT
Start: 2024-05-09 | End: 2024-05-14

## 2024-05-09 RX ORDER — QUETIAPINE FUMARATE 200 MG/1
25 TABLET, FILM COATED ORAL AT BEDTIME
Refills: 0 | Status: DISCONTINUED | OUTPATIENT
Start: 2024-05-09 | End: 2024-05-14

## 2024-05-09 RX ORDER — MEMANTINE HYDROCHLORIDE 10 MG/1
1 TABLET ORAL
Qty: 0 | Refills: 0 | DISCHARGE

## 2024-05-09 RX ORDER — SERTRALINE 25 MG/1
2 TABLET, FILM COATED ORAL
Refills: 0 | DISCHARGE

## 2024-05-09 RX ORDER — ASPIRIN/CALCIUM CARB/MAGNESIUM 324 MG
81 TABLET ORAL DAILY
Refills: 0 | Status: DISCONTINUED | OUTPATIENT
Start: 2024-05-09 | End: 2024-05-14

## 2024-05-09 RX ORDER — DONEPEZIL HYDROCHLORIDE 10 MG/1
1 TABLET, FILM COATED ORAL
Refills: 0 | DISCHARGE

## 2024-05-09 RX ORDER — DONEPEZIL HYDROCHLORIDE 10 MG/1
5 TABLET, FILM COATED ORAL
Refills: 0 | Status: DISCONTINUED | OUTPATIENT
Start: 2024-05-09 | End: 2024-05-14

## 2024-05-09 RX ADMIN — QUETIAPINE FUMARATE 12.5 MILLIGRAM(S): 200 TABLET, FILM COATED ORAL at 19:57

## 2024-05-09 RX ADMIN — QUETIAPINE FUMARATE 25 MILLIGRAM(S): 200 TABLET, FILM COATED ORAL at 22:11

## 2024-05-09 RX ADMIN — DONEPEZIL HYDROCHLORIDE 5 MILLIGRAM(S): 10 TABLET, FILM COATED ORAL at 22:18

## 2024-05-09 RX ADMIN — TAMSULOSIN HYDROCHLORIDE 0.8 MILLIGRAM(S): 0.4 CAPSULE ORAL at 19:59

## 2024-05-09 NOTE — CONSULT NOTE ADULT - ASSESSMENT
CKD(III): at baseline renal function  Profound dementia, progressive--> adult FTT  Dependent edema  Chronic urinary retention/BPH  - avoid potential nephrotoxins  - low NaCl diet, daily weights and consider addition of diuretics as needed--> accept azotemia  - monitor labs

## 2024-05-09 NOTE — H&P ADULT - ASSESSMENT
72 y/o male PMH of CKD st 3, Hyperlipidemia, fecal/urinary incontinence),hx  Enterovirus infection, Falls, Hx of Dementia, HTN, hx elevated PSA,hx recurrent fall with unsteady gait, presenting to the ER with progressive decline mental status with worsening functional status, Also noticing some swelling in his legs. Pt was seen by psychiatry, increased zoloft Seroquel doses.   Family having a more difficult time taking care of him. Family denies any fever,chill,cough,cp,sob,n/v/d,Has Chronic urinary retention/ incontinent following urology. No acute change in po intake, no acute fall.Pt is pleasantly confuse. Pt denies any acute issues.    EMANUEL on CKD stage 3b  -Cr 1.7(Baseline 1.5)  -Multi-factorial from inadequate PO intake, ACE-I use, BPH retention   - IV fluid   -Bladder scan. I/O  -Hold ACEI  -C/S Nephrology      Progressive decline cognitive function likely from progression of Dementia  -CTH,Cspine given hx recurrent fall.   -No sign of infection, cxr/UA stable,afebrile,nl wbc  -Resume Aricept Seroquel, Sertraline     Recurrent fall  -CTH,C-SPINE  -fall precaution  -PT eval    Legs swelling  -Not present now  -BNP  -LE doppler    HLP  -Statin     HX Urinary retention with urinary incontinent/BPH  -Cont. Flomax .8mg HS'  -bladder scan      HTN   -Hold Lisinopril  -bp soft    GI PPX PPI  DVT PPX sq Heparin  c/s nutrition, speech eval,PT eval    Disposition: plan for MELANIE when stable    Plan of care dw wife Desirae ([phone]) and Sister in Law Sera at bedside    74 y/o male PMH of CKD st 3, Hyperlipidemia, fecal/urinary incontinence),hx  Enterovirus infection, Falls, Hx of Dementia, HTN, hx elevated PSA,hx recurrent fall with unsteady gait, presenting to the ER with progressive decline mental status with worsening functional status, Also noticing some swelling in his legs. Pt was seen by psychiatry, increased zoloft Seroquel doses.   Family having a more difficult time taking care of him. Family denies any fever,chill,cough,cp,sob,n/v/d,Has Chronic urinary retention/ incontinent following urology. No acute change in po intake, no acute fall.Pt is pleasantly confuse. Pt denies any acute issues.    EMANUEL on CKD stage 3b  -Cr 1.7(Baseline 1.5)  -Multi-factorial from inadequate PO intake, ACE-I use, BPH retention   - IV fluid   -Bladder scan. I/O  -Renal us  -Hold ACEI  -C/S Nephrology      Progressive decline cognitive function likely from progression of Dementia  -CTH,Cspine given hx recurrent fall.   -No sign of infection, cxr/UA stable,afebrile,nl wbc  -Resume Aricept Seroquel, Sertraline     Recurrent fall  -CTH,C-SPINE  -fall precaution  -PT eval    Legs swelling  -Not present now  -BNP  -LE doppler    HLP  -Statin     HX Urinary retention with urinary incontinent/BPH  -Cont. Flomax 0.8mg HS'  -bladder scan      HTN   -Hold Lisinopril  -bp soft    GI PPX PPI  DVT PPX sq Heparin  c/s nutrition, speech eval,PT eval    Disposition: plan for MELANIE when stable    Plan of care dw wife Desirae ([phone]) and Sister in Law Sera at bedside

## 2024-05-09 NOTE — ED ADULT NURSE REASSESSMENT NOTE - NS ED NURSE REASSESS COMMENT FT1
patient sent to ultrasound and ct scan, report given to floor nurse. educated family on plan of care.

## 2024-05-09 NOTE — CHART NOTE - NSCHARTNOTEFT_GEN_A_CORE
SWNote: worker met with pt and pt's family at bedside . SW services explained , verbalized understanding. Per pt's wife (Desirae ) pt has been diagnosed with Alzheimer's for over 5 yrs , Desirae has noticed a significant deterioration in pt's behavior (unable to follow simple commands, incontinent ,more disoriented) in the past 2 weeks . Per Desirae, pt's legs are swollen and pt having difficulty to ambulate . Worker explained common process, explored feelings about MELANIE if needed. Desirae willing to follow all medical team recc. . List of facilities given to review in case MELANIE needed, agreed to review and consider options in order of preference, SW to follow.

## 2024-05-09 NOTE — ED PROVIDER NOTE - CLINICAL SUMMARY MEDICAL DECISION MAKING FREE TEXT BOX
, attendin-year-old male history of dementia, hypertension, CAD not on dialysis, BPH presents with mental decline and progressive weakness for the past 2 weeks but more acutely over the past 2 days.  Family report he had 4 falls at home, falls or weakness and fell to the  buttocks.  No head injury.  Family report he had a urinary retention but now is having urinary incontinence.  Patient had fecal incontinence at baseline.  No fever.  Family noted that he had bilateral swelling to the ankles.  Patient is ANO x 1 at baseline and is ambulatory at baseline, he is no longer able to ambulate.  On exam patient is AO x 1,  awake and pleasant, cooperative, lungs clear, abdomen soft, mild edema to the bilateral ankles, no calf tenderness.  No leukocytosis, hemoglobin 13, creatinine 1.7, Urinalysis demonstrates no acute pathology.  Urine culture pending. Flu, COIVD, and RSV negative.  pending CT head and  duplex.  Patient admitted for failure to thrive and  needing MELANIE placement.

## 2024-05-09 NOTE — ED ADULT TRIAGE NOTE - CHIEF COMPLAINT QUOTE
"more confused than usual. legs are really swollen. unsteady more than usual. fallen 4 times." as per family last known normal was "about 2 weeks ago." history of dementia

## 2024-05-09 NOTE — H&P ADULT - NSHPPHYSICALEXAM_GEN_ALL_CORE
T(C): 36.7 (05-09-24 @ 11:40), Max: 36.7 (05-09-24 @ 11:40)  HR: 69 (05-09-24 @ 11:40) (69 - 88)  BP: 104/64 (05-09-24 @ 11:40) (104/64 - 104/64)  RR: 17 (05-09-24 @ 11:40) (16 - 17)  SpO2: 100% (05-09-24 @ 11:40) (97% - 100%)    GEN - NAD  HEENT - NCAT, EOMI, PIOTR,   RESP - CTA BL, no wheeze//rhonchi/crackles. not on supplemental O2.  CARDIO - NS1S2, RRR. No murmurs  ABD - Soft/Non tender/Non distended. Normal BS x4 quadrants.   Ext - No MARYCHUY.   MSK - no acute joints swelling/tenderness  Neuro - cn 2-12 grossly intact.  +m/s  Psych- AAOx1.Calm

## 2024-05-09 NOTE — ED PROVIDER NOTE - OBJECTIVE STATEMENT
72 y/o male w/PMHx of Dementia, HTN, CKD Stage III presenting to the ER w/increased recurrent falls and worsening functional status (not able to respond to simple questions, fecal/urinary incontinence). Also noticing some swelling in his legs. Family having a more difficult time taking care of him and would like to discuss MELANIE if possible.

## 2024-05-09 NOTE — ED ADULT NURSE NOTE - OBJECTIVE STATEMENT
patient with increasingly worsening mental status, has dementia and is confused at baseline. trouble following basic commands per wife and sister at bedside. patient is resting comfortably, offers no complaints at this time. bilateral pitting edema to lower extremities.

## 2024-05-09 NOTE — CONSULT NOTE ADULT - SUBJECTIVE AND OBJECTIVE BOX
Patient is a 73y old  Male who presents with a chief complaint of     HPI: The pt is a 74 yo male PMH + dementia (history from chart and wife at bedside). Pt has history of CKD(III) and I see him in the office on a routine basis.  Now presents to the [facility] ED with progressive clinical deterioration, including worsening mental status and frequent falls, as well as worsening edema.       PAST MEDICAL & SURGICAL HISTORY:  HTN (hypertension)      Dementia      Stage 3 chronic kidney disease      H/O urinary retention      H/O shoulder surgery          FAMILY HISTORY:  FH: HTN (hypertension)        Social History:  No current tobacco; no etoh nor drug abuse    MEDICATIONS  (STANDING):  aspirin enteric coated 81 milliGRAM(s) Oral daily  clobetasol 0.05% Cream 1 Application(s) Topical two times a day  dextrose 5% + sodium chloride 0.9%. 1000 milliLiter(s) (70 mL/Hr) IV Continuous <Continuous>  donepezil 5 milliGRAM(s) Oral <User Schedule>  heparin   Injectable 5000 Unit(s) SubCutaneous every 12 hours  QUEtiapine 12.5 milliGRAM(s) Oral daily  QUEtiapine 25 milliGRAM(s) Oral at bedtime  sertraline 100 milliGRAM(s) Oral daily  tamsulosin 0.8 milliGRAM(s) Oral at bedtime    MEDICATIONS  (PRN):  acetaminophen     Tablet .. 650 milliGRAM(s) Oral every 6 hours PRN Temp greater or equal to 38C (100.4F), Mild Pain (1 - 3)  aluminum hydroxide/magnesium hydroxide/simethicone Suspension 30 milliLiter(s) Oral every 4 hours PRN Dyspepsia  melatonin 3 milliGRAM(s) Oral at bedtime PRN Insomnia  ondansetron Injectable 4 milliGRAM(s) IV Push every 8 hours PRN Nausea and/or Vomiting      Allergies    No Known Allergies    Intolerances        REVIEW OF SYSTEMS:  Difficult to assess due to mental status      Vital Signs Last 24 Hrs  T(C): 36.3 (09 May 2024 16:17), Max: 36.7 (09 May 2024 11:40)  T(F): 97.4 (09 May 2024 16:17), Max: 98 (09 May 2024 11:40)  HR: 77 (09 May 2024 16:17) (69 - 88)  BP: 124/72 (09 May 2024 16:17) (104/64 - 124/72)  BP(mean): --  RR: 16 (09 May 2024 16:17) (16 - 17)  SpO2: 96% (09 May 2024 16:17) (96% - 100%)    Parameters below as of 09 May 2024 16:17  Patient On (Oxygen Delivery Method): room air        PHYSICAL EXAM:  GENERAL: Appears comfortable  HEAD:  Atraumatic, Normocephalic  EYES: Conjunctiva and sclera clear  ENMT: No periorbital edema  NECK: Supple, No JVD  NERVOUS SYSTEM:  Awake but not oriented; + dementia  CHEST/LUNG: Clear B/L  HEART: No rub  ABDOMEN: Soft, Nontender, +BS  EXTREMITIES: + LE edema bilaterally  SKIN: No rashes or lesions      LABS:                        13.1   6.95  )-----------( 254      ( 09 May 2024 11:02 )             38.9         139  |  108  |  47.3<H>  ----------------------------<  111<H>  4.6   |  20.0<L>  |  1.75<H>    Ca    8.9      09 May 2024 11:02    TPro  6.3<L>  /  Alb  4.0  /  TBili  0.4  /  DBili  x   /  AST  17  /  ALT  29  /  AlkPhos  75      PT/INR - ( 09 May 2024 11:02 )   PT: 11.3 sec;   INR: 1.02 ratio         PTT - ( 09 May 2024 11:02 )  PTT:30.8 sec  Urinalysis Basic - ( 09 May 2024 12:57 )    Color: Yellow / Appearance: Clear / S.010 / pH: x  Gluc: x / Ketone: Negative mg/dL  / Bili: Negative / Urobili: 0.2 mg/dL   Blood: x / Protein: Negative mg/dL / Nitrite: Negative   Leuk Esterase: Negative / RBC: x / WBC x   Sq Epi: x / Non Sq Epi: x / Bacteria: x          RADIOLOGY & ADDITIONAL TESTS:  < from: CT Head No Cont (24 @ 15:31) >  ACC: [id number] EXAM:  CT CERVICAL SPINE   ORDERED BY: FELIX MEDINA     ACC: [id number] EXAM:  CT BRAIN   ORDERED BY: MOHAMED CAROL     PROCEDURE DATE:  2024          INTERPRETATION:  CLINICAL STATEMENT: Trauma..    TECHNIQUE: CT of the head and cervical spine were performed without IV   contrast. 3-D/MIP images obtained    COMPARISON: CT head 7/10/2023    FINDINGS:  Head:  There is moderate diffuse parenchymal volume loss.    There are areas of low attenuation in the periventricular white matter   likely related to mild chronic microvascular ischemic changes.    There is no acute intracranial hemorrhage, parenchymal mass, mass effect   or midline shift. There is no acute territorial infarct. Partial empty   sella    Prominent ventricles unchanged which may be related to central atrophy   and/or normal pressure hydrocephalus in the correct clinical setting.    The cranium is intact. The visualized paranasal sinuses are well-aerated.    Cervical spine:  Vertebral body heights intact. Grade 1 anterolisthesis C3 on C4, C7 on   T1. Straightening of the cervical lordosis    Evaluation of the spinal canal is limited on a CT exam.  Multilevel   degenerative changes noted resulting in multilevel spinal canal stenosis   and neural foraminalnarrowing.        IMPRESSION:  No acute intracranial hemorrhage.    No acute fracture cervical spine. If pain persists, follow-up MRI exam   recommended (if no contraindication).    < end of copied text >

## 2024-05-09 NOTE — H&P ADULT - HISTORY OF PRESENT ILLNESS
74 y/o male PMH of CKD st 3, Hyperlipidemia, fecal/urinary incontinence),hx  Enterovirus infection, Falls, Hx of Dementia, HTN, hx elevated PSA,hx recurrent fall with unsteady gait, presenting to the ER with progressive decline mental status w/increased recurrent falls and worsening functional status, Also noticing some swelling in his legs. Pt was seen by psychiatry, increased zoloft Seroquel doses.   Family having a more difficult time taking care of him. Family denies any fever,chill,cough,cp,sob,n/v/d,Has Chronic urinary retention/ incontinent following urology. No acute change in po intake, no acute fall.Pt is pleasantly confuse. Pt denies any acute issues.       PAST MEDICAL HISTORY:CKD, Hyperlipidemia, fecal/urinary incontinence),hx  Enterovirus infection, Falls, Hx of Dementia, HTN, hx elevated PSA,hx recurrent fall with unsteady gait,  Dementia     H/O urinary retention     HTN (hypertension)     Stage 3 chronic kidney disease.     PAST SURGICAL HISTORY:  H/O shoulder surgery.     FAMILY HISTORY:  FH: HTN (hypertension).     Social History: Denies smoking/alcohol/illicit drug use

## 2024-05-10 LAB
ANION GAP SERPL CALC-SCNC: 12 MMOL/L — SIGNIFICANT CHANGE UP (ref 5–17)
BUN SERPL-MCNC: 33.3 MG/DL — HIGH (ref 8–20)
CALCIUM SERPL-MCNC: 8.5 MG/DL — SIGNIFICANT CHANGE UP (ref 8.4–10.5)
CHLORIDE SERPL-SCNC: 106 MMOL/L — SIGNIFICANT CHANGE UP (ref 96–108)
CO2 SERPL-SCNC: 19 MMOL/L — LOW (ref 22–29)
CREAT SERPL-MCNC: 1.54 MG/DL — HIGH (ref 0.5–1.3)
CULTURE RESULTS: NO GROWTH — SIGNIFICANT CHANGE UP
EGFR: 47 ML/MIN/1.73M2 — LOW
GLUCOSE SERPL-MCNC: 94 MG/DL — SIGNIFICANT CHANGE UP (ref 70–99)
HCT VFR BLD CALC: 39 % — SIGNIFICANT CHANGE UP (ref 39–50)
HGB BLD-MCNC: 13.2 G/DL — SIGNIFICANT CHANGE UP (ref 13–17)
MCHC RBC-ENTMCNC: 31.9 PG — SIGNIFICANT CHANGE UP (ref 27–34)
MCHC RBC-ENTMCNC: 33.8 GM/DL — SIGNIFICANT CHANGE UP (ref 32–36)
MCV RBC AUTO: 94.2 FL — SIGNIFICANT CHANGE UP (ref 80–100)
PLATELET # BLD AUTO: 275 K/UL — SIGNIFICANT CHANGE UP (ref 150–400)
POTASSIUM SERPL-MCNC: 4.2 MMOL/L — SIGNIFICANT CHANGE UP (ref 3.5–5.3)
POTASSIUM SERPL-SCNC: 4.2 MMOL/L — SIGNIFICANT CHANGE UP (ref 3.5–5.3)
RBC # BLD: 4.14 M/UL — LOW (ref 4.2–5.8)
RBC # FLD: 12.1 % — SIGNIFICANT CHANGE UP (ref 10.3–14.5)
SODIUM SERPL-SCNC: 137 MMOL/L — SIGNIFICANT CHANGE UP (ref 135–145)
SPECIMEN SOURCE: SIGNIFICANT CHANGE UP
WBC # BLD: 8.02 K/UL — SIGNIFICANT CHANGE UP (ref 3.8–10.5)
WBC # FLD AUTO: 8.02 K/UL — SIGNIFICANT CHANGE UP (ref 3.8–10.5)

## 2024-05-10 PROCEDURE — 99233 SBSQ HOSP IP/OBS HIGH 50: CPT

## 2024-05-10 RX ORDER — CHOLECALCIFEROL (VITAMIN D3) 125 MCG
1000 CAPSULE ORAL DAILY
Refills: 0 | Status: DISCONTINUED | OUTPATIENT
Start: 2024-05-10 | End: 2024-05-14

## 2024-05-10 RX ORDER — SODIUM BICARBONATE 1 MEQ/ML
650 SYRINGE (ML) INTRAVENOUS
Refills: 0 | Status: DISCONTINUED | OUTPATIENT
Start: 2024-05-10 | End: 2024-05-14

## 2024-05-10 RX ORDER — OLANZAPINE 15 MG/1
2.5 TABLET, FILM COATED ORAL EVERY 8 HOURS
Refills: 0 | Status: DISCONTINUED | OUTPATIENT
Start: 2024-05-10 | End: 2024-05-13

## 2024-05-10 RX ADMIN — SERTRALINE 100 MILLIGRAM(S): 25 TABLET, FILM COATED ORAL at 08:27

## 2024-05-10 RX ADMIN — Medication 1 APPLICATION(S): at 16:02

## 2024-05-10 RX ADMIN — TAMSULOSIN HYDROCHLORIDE 0.8 MILLIGRAM(S): 0.4 CAPSULE ORAL at 19:41

## 2024-05-10 RX ADMIN — QUETIAPINE FUMARATE 25 MILLIGRAM(S): 200 TABLET, FILM COATED ORAL at 19:50

## 2024-05-10 RX ADMIN — HEPARIN SODIUM 5000 UNIT(S): 5000 INJECTION INTRAVENOUS; SUBCUTANEOUS at 05:57

## 2024-05-10 RX ADMIN — Medication 1 APPLICATION(S): at 08:28

## 2024-05-10 RX ADMIN — HEPARIN SODIUM 5000 UNIT(S): 5000 INJECTION INTRAVENOUS; SUBCUTANEOUS at 16:01

## 2024-05-10 RX ADMIN — DONEPEZIL HYDROCHLORIDE 5 MILLIGRAM(S): 10 TABLET, FILM COATED ORAL at 08:27

## 2024-05-10 RX ADMIN — OLANZAPINE 2.5 MILLIGRAM(S): 15 TABLET, FILM COATED ORAL at 09:50

## 2024-05-10 RX ADMIN — DONEPEZIL HYDROCHLORIDE 5 MILLIGRAM(S): 10 TABLET, FILM COATED ORAL at 19:40

## 2024-05-10 RX ADMIN — QUETIAPINE FUMARATE 12.5 MILLIGRAM(S): 200 TABLET, FILM COATED ORAL at 08:27

## 2024-05-10 RX ADMIN — Medication 81 MILLIGRAM(S): at 08:27

## 2024-05-10 RX ADMIN — Medication 650 MILLIGRAM(S): at 16:19

## 2024-05-10 NOTE — PATIENT PROFILE ADULT - NSPROPTRIGHTNOTIFYPHONE_GEN_A_NUR
Contacted mom-   Video visit scheduled for 9/23 at 4:45 pm     Advised mom to call back if she has additional questions or concerns   Mom verbalized understanding
Detail Level: Detailed
[phone]

## 2024-05-10 NOTE — OCCUPATIONAL THERAPY INITIAL EVALUATION ADULT - PATIENT/FAMILY/SIGNIFICANT OTHER GOALS STATEMENT, OT EVAL
Recommendations for Dry Eyes    • Use artificial tear drops (like \"Refresh,\" \"Systane,\" \"Soothe\" or \"Blink\") up to 6 times per day for tearing, irritation, or redness. Drops marked as \"preservative free\" can be used as much as needed--even every hour or more.    • Use a hot washcloth to apply heat to the eyelids for at least several minutes, one or two times per day. Alternatively, you can use a microwaveable eye mask (like a \"Rachael Moist Heat Eye Compress,\" a \"Thermalon Dry Eye Compress\" or a \"Fire and Ice Mask\"), which tend to stay hot for longer. Anything you use should be very warm--but not hot enough to be uncomfortable.     • Gently clean the eyelashes with a small amount of baby shampoo or dilute soap about every two days.    • Omega-3 supplements (fish oil, krill oil, flaxseed oil, etc. at a total dose of 1 to 2 grams per day; \"re-esterified\" oils may be most effective).    • Keep well hydrated.     • Use a room humidifier as needed.    • If the eyes are still tearing--call us.    
wife requesting MELANIE, pt unable to state d/t cognitive deficits

## 2024-05-10 NOTE — PATIENT PROFILE ADULT - FALL HARM RISK - HARM RISK INTERVENTIONS
Assistance with ambulation/Assistance OOB with selected safe patient handling equipment/Communicate Risk of Fall with Harm to all staff/Discuss with provider need for PT consult/Monitor for mental status changes/Monitor gait and stability/Move patient closer to nurses' station/Provide patient with walking aids - walker, cane, crutches/Reinforce activity limits and safety measures with patient and family/Reorient to person, place and time as needed/Tailored Fall Risk Interventions/Toileting schedule using arm’s reach rule for commode and bathroom/Use of alarms - bed, chair and/or voice tab/Visual Cue: Yellow wristband and red socks/Bed in lowest position, wheels locked, appropriate side rails in place/Call bell, personal items and telephone in reach/Instruct patient to call for assistance before getting out of bed or chair/Non-slip footwear when patient is out of bed/[place] to call system/Physically safe environment - no spills, clutter or unnecessary equipment/Purposeful Proactive Rounding/Room/bathroom lighting operational, light cord in reach

## 2024-05-10 NOTE — OCCUPATIONAL THERAPY INITIAL EVALUATION ADULT - ADDITIONAL COMMENTS
t is A+O to self only, social history provided by wife. Pt lives with spouse in private home, 1 [address] without handrail, no steps inside. Pt was independent with ambulation until 2 weeks prior, ambulating with RW and assist for safety. Wife reports pt has fallen 4 times in last month; requiring increased assist for ADLs. Pt has home health aide 3x week, 4 hours per day and was receiving PT/OT at home, pt requires assist for all ADL (is incontinent bowel and bladder)

## 2024-05-10 NOTE — PROGRESS NOTE ADULT - ASSESSMENT
72 y/o male PMH of CKD st 3, Hyperlipidemia, fecal/urinary incontinence),hx  Enterovirus infection, Falls, Hx of Dementia, HTN, hx elevated PSA,hx recurrent fall with unsteady gait, presenting to the ER with progressive decline mental status with worsening functional status, Also noticing some swelling in his legs. Pt was seen by psychiatry, increased zoloft Seroquel doses.   Family having a more difficult time taking care of him. Family denies any fever,chill,cough,cp,sob,n/v/d,Has Chronic urinary retention/ incontinent following urology. No acute change in po intake, no acute fall.Pt is pleasantly confuse. Pt denies any acute issues.   72 y/o male PMH of CKD st 3, Hyperlipidemia, fecal/urinary incontinence),hx  Enterovirus infection, Falls, Hx of Dementia, HTN, hx elevated PSA,hx recurrent fall with unsteady gait, presenting to the ER with progressive decline mental status with worsening functional status, Also noticing some swelling in his legs. Pt was seen by psychiatry, increased zoloft Seroquel doses.   Family having a more difficult time taking care of him. Family denies any fever,chill,cough,cp,sob,n/v/d,Has Chronic urinary retention/ incontinent following urology. No acute change in po intake, no acute fall.      72 y/o male PMH of CKD st 3, Hyperlipidemia, fecal/urinary incontinence),hx  Enterovirus infection, Falls, Hx of Dementia, HTN, hx elevated PSA,hx recurrent fall with unsteady gait, presenting to the ER with progressive decline mental status with worsening functional status, Also noticing some swelling in his legs. Pt was seen by psychiatry, increased zoloft Seroquel doses.   Family having a more difficult time taking care of him. Family denies any fever,chill,cough,cp,sob,n/v/d,Has Chronic urinary retention/ incontinent following urology. No acute change in po intake, no acute fall.Pt is pleasantly confuse. Pt denies any acute issues.    1-EMANUEL on CKD stage 3b  -Cr 1.7(Baseline 1.5)  -Multi-factorial from inadequate PO intake, ACE-I use, BPH retention   -cont IVF , Na bicarbonate   -Bladder scan. I/O  -Renal us ordered   -Hold ACEI    2- Metabolic acidosis   cont IVF   Na bicarbonate 500mg bid   monitor     3-Progressive decline cognitive function likely from progression of Dementia  -No sign of infection, cxr/UA neg   -Resume Aricept Seroquel, Sertraline   zyprexa prn for behavioral issues     4-Recurrent fall  -CTH,C-SPINE no fracture   -fall precautions  -PT eval    5-Legs swelling  -Not present now  -BNP  -LE doppler neg for DVT     6-HLP  -Statin     7-HX Urinary retention with urinary incontinent/BPH  -Cont. Flomax 0.8mg HS  -bladder scan    8-HTN   -Hold Lisinopril  -bp soft      GI PPX PPI  DVT PPX sq Heparin  c/s nutrition,  Pt evaluation   d/w wife over the phone

## 2024-05-10 NOTE — SWALLOW BEDSIDE ASSESSMENT ADULT - SLP GENERAL OBSERVATIONS
Pt received & seen seated upright in bed, awake/alert, reduced cognition, spouse present who assisted in providing relevant information, 0/10 pain pre/post

## 2024-05-10 NOTE — PROGRESS NOTE ADULT - ASSESSMENT
CKD(III): at baseline renal function  Profound dementia, progressive--> adult FTT  Dependent edema  Stop IVF   Atrophic L kidney on sono 12/23   CXR - No PVC or effusion       HTN - Lisinopril held   Watch trend off  Has a prn for Hydralazine       Chronic urinary retention/BPH  Watch bladder scans   Flomax     MA - Add oral Bicarb bid  CKD(III): at baseline renal function  Profound dementia, progressive--> adult FTT  Dependent edema  Stop IVF   Atrophic L kidney on sono 12/23   CXR - No PVC or effusion   Repeat renal sono ordered       HTN - Lisinopril held   Watch trend off  Has a prn for Hydralazine       Chronic urinary retention/BPH  Watch bladder scans   Flomax     MA - Add oral Bicarb bid

## 2024-05-10 NOTE — PHYSICAL THERAPY INITIAL EVALUATION ADULT - PERTINENT HX OF CURRENT PROBLEM, REHAB EVAL
Pt is 72 y/o male PMH of CKD st 3, Hyperlipidemia, fecal/urinary incontinence),hx  Enterovirus infection, Falls, Hx of Dementia, HTN, hx elevated PSA,hx recurrent fall with unsteady gait, presenting to the ER with progressive decline mental status w/increased recurrent falls and worsening functional status, Also noticing some swelling in his legs.

## 2024-05-10 NOTE — OCCUPATIONAL THERAPY INITIAL EVALUATION ADULT - GENERAL OBSERVATIONS, REHAB EVAL
pt received in bed and left as found, wife bedside, c/o pain left shoulder with movement (pt unable to quantify), A&O x1, poor command following

## 2024-05-10 NOTE — OCCUPATIONAL THERAPY INITIAL EVALUATION ADULT - PERTINENT HX OF CURRENT PROBLEM, REHAB EVAL
Pt is 74 y/o male PMH of CKD st 3, Hyperlipidemia, fecal/urinary incontinence),hx  Enterovirus infection, Falls, Hx of Dementia, HTN, hx elevated PSA,hx recurrent fall with unsteady gait, presenting to the ER with progressive decline mental status w/increased recurrent falls and worsening functional status, Also noticing some swelling in his legs

## 2024-05-10 NOTE — PROGRESS NOTE ADULT - SUBJECTIVE AND OBJECTIVE BOX
Patient is a 73y old  Male who presents with a chief complaint of     Chart reviewed   incomplete     ALLERGIES:  No Known Allergies    MEDICATIONS  (STANDING):  aspirin enteric coated 81 milliGRAM(s) Oral daily  clobetasol 0.05% Cream 1 Application(s) Topical two times a day  dextrose 5% + sodium chloride 0.9%. 1000 milliLiter(s) (70 mL/Hr) IV Continuous <Continuous>  donepezil 5 milliGRAM(s) Oral <User Schedule>  heparin   Injectable 5000 Unit(s) SubCutaneous every 12 hours  QUEtiapine 12.5 milliGRAM(s) Oral daily  QUEtiapine 25 milliGRAM(s) Oral at bedtime  sertraline 100 milliGRAM(s) Oral daily  tamsulosin 0.8 milliGRAM(s) Oral at bedtime    MEDICATIONS  (PRN):  acetaminophen     Tablet .. 650 milliGRAM(s) Oral every 6 hours PRN Temp greater or equal to 38C (100.4F), Mild Pain (1 - 3)  aluminum hydroxide/magnesium hydroxide/simethicone Suspension 30 milliLiter(s) Oral every 4 hours PRN Dyspepsia  hydrALAZINE Injectable 5 milliGRAM(s) IV Push every 6 hours PRN sbp>159  melatonin 3 milliGRAM(s) Oral at bedtime PRN Insomnia  ondansetron Injectable 4 milliGRAM(s) IV Push every 8 hours PRN Nausea and/or Vomiting    Vital Signs Last 24 Hrs  T(F): 97.8 (10 May 2024 05:37), Max: 98.2 (10 May 2024 00:52)  HR: 72 (10 May 2024 05:37) (69 - 88)  BP: 108/69 (10 May 2024 05:37) (104/64 - 149/82)  RR: 18 (10 May 2024 05:37) (16 - 18)  SpO2: 97% (10 May 2024 05:37) (95% - 100%)  I&O's Summary    09 May 2024 07:01  -  10 May 2024 07:00  --------------------------------------------------------  IN: 840 mL / OUT: 500 mL / NET: 340 mL        PHYSICAL EXAM:  General:   ENT:   Neck:   Lungs:   Cardio: RRR, S1/S2, No murmur  Abdomen: Soft, Nontender, Nondistended; Bowel sounds present  Extremities: No calf tenderness, No pitting edema    LABS:                        13.2   8.02  )-----------( 275      ( 10 May 2024 05:56 )             39.0     05-10    137  |  106  |  33.3  ----------------------------<  94  4.2   |  19.0  |  1.54    Ca    8.5      10 May 2024 05:56    TPro  6.3  /  Alb  4.0  /  TBili  0.4  /  DBili  x   /  AST  17  /  ALT  29  /  AlkPhos  75  05-09          PT/INR - ( 09 May 2024 11:02 )   PT: 11.3 sec;   INR: 1.02 ratio         PTT - ( 09 May 2024 11:02 )  PTT:30.8 sec                            Urinalysis Basic - ( 10 May 2024 05:56 )    Color: x / Appearance: x / SG: x / pH: x  Gluc: 94 mg/dL / Ketone: x  / Bili: x / Urobili: x   Blood: x / Protein: x / Nitrite: x   Leuk Esterase: x / RBC: x / WBC x   Sq Epi: x / Non Sq Epi: x / Bacteria: x          RADIOLOGY & ADDITIONAL TESTS:       Patient is a 73y old  Male who presents with a chief complaint of     Chart reviewed   Pt is seen this am , he is confused   awake not oriented to self place , time or events       ALLERGIES:  No Known Allergies    MEDICATIONS  (STANDING):  aspirin enteric coated 81 milliGRAM(s) Oral daily  clobetasol 0.05% Cream 1 Application(s) Topical two times a day  dextrose 5% + sodium chloride 0.9%. 1000 milliLiter(s) (70 mL/Hr) IV Continuous <Continuous>  donepezil 5 milliGRAM(s) Oral <User Schedule>  heparin   Injectable 5000 Unit(s) SubCutaneous every 12 hours  QUEtiapine 12.5 milliGRAM(s) Oral daily  QUEtiapine 25 milliGRAM(s) Oral at bedtime  sertraline 100 milliGRAM(s) Oral daily  tamsulosin 0.8 milliGRAM(s) Oral at bedtime    MEDICATIONS  (PRN):  acetaminophen     Tablet .. 650 milliGRAM(s) Oral every 6 hours PRN Temp greater or equal to 38C (100.4F), Mild Pain (1 - 3)  aluminum hydroxide/magnesium hydroxide/simethicone Suspension 30 milliLiter(s) Oral every 4 hours PRN Dyspepsia  hydrALAZINE Injectable 5 milliGRAM(s) IV Push every 6 hours PRN sbp>159  melatonin 3 milliGRAM(s) Oral at bedtime PRN Insomnia  ondansetron Injectable 4 milliGRAM(s) IV Push every 8 hours PRN Nausea and/or Vomiting    Vital Signs Last 24 Hrs  T(F): 97.8 (10 May 2024 05:37), Max: 98.2 (10 May 2024 00:52)  HR: 72 (10 May 2024 05:37) (69 - 88)  BP: 108/69 (10 May 2024 05:37) (104/64 - 149/82)  RR: 18 (10 May 2024 05:37) (16 - 18)  SpO2: 97% (10 May 2024 05:37) (95% - 100%)  I&O's Summary    09 May 2024 07:01  -  10 May 2024 07:00  --------------------------------------------------------  IN: 840 mL / OUT: 500 mL / NET: 340 mL        PHYSICAL EXAM:  General: awake confused  Neck: supple , no JVD   Lungs: CTA bilateral   Cardio: RRR, S1/S2, No murmur  Abdomen: Soft, Nontender, Nondistended; Bowel sounds present  Extremities: No calf tenderness, No pitting edema    LABS:                        13.2   8.02  )-----------( 275      ( 10 May 2024 05:56 )             39.0     05-10    137  |  106  |  33.3  ----------------------------<  94  4.2   |  19.0  |  1.54    Ca    8.5      10 May 2024 05:56    TPro  6.3  /  Alb  4.0  /  TBili  0.4  /  DBili  x   /  AST  17  /  ALT  29  /  AlkPhos  75  05-09          PT/INR - ( 09 May 2024 11:02 )   PT: 11.3 sec;   INR: 1.02 ratio         PTT - ( 09 May 2024 11:02 )  PTT:30.8 sec                            Urinalysis Basic - ( 10 May 2024 05:56 )    Color: x / Appearance: x / SG: x / pH: x  Gluc: 94 mg/dL / Ketone: x  / Bili: x / Urobili: x   Blood: x / Protein: x / Nitrite: x   Leuk Esterase: x / RBC: x / WBC x   Sq Epi: x / Non Sq Epi: x / Bacteria: x          RADIOLOGY & ADDITIONAL TESTS:

## 2024-05-10 NOTE — SWALLOW BEDSIDE ASSESSMENT ADULT - COMMENTS
As per MD note: "74 y/o male PMH of CKD st 3, Hyperlipidemia, fecal/urinary incontinence),hx  Enterovirus infection, Falls, Hx of Dementia, HTN, hx elevated PSA,hx recurrent fall with unsteady gait, presenting to the ER with progressive decline mental status w/increased recurrent falls and worsening functional status, Also noticing some swelling in his legs. Pt was seen by psychiatry, increased zoloft Seroquel doses.   Family having a more difficult time taking care of him. Family denies any fever,chill,cough,cp,sob,n/v/d,Has Chronic urinary retention/ incontinent following urology. No acute change in po intake, no acute fall.Pt is pleasantly confuse. Pt denies any acute issues."

## 2024-05-10 NOTE — PHYSICAL THERAPY INITIAL EVALUATION ADULT - ADDITIONAL COMMENTS
Pt is A+O to self only, social history provided by wife. Pt lives with spouse in private home, 1 [address] without handrail, no steps inside. Pt was independent with ambulation until 2 weeks prior, ambulating with RW and assist for safety. Wife reports pt has fallen 4 times in last month; requiring increased assist for ADLs. Pt has home health aide 3x week, 4 hours per day and was receiving PT/OT at home

## 2024-05-10 NOTE — SWALLOW BEDSIDE ASSESSMENT ADULT - SWALLOW EVAL: DIAGNOSIS
Oral & pharyngeal stage of swallow clinically unremarkable across administered trials with NO overt s/s aspiration noted post intake

## 2024-05-10 NOTE — PROGRESS NOTE ADULT - SUBJECTIVE AND OBJECTIVE BOX
NEPHROLOGY INTERVAL HPI/OVERNIGHT EVENTS:    Feels OK   No complaints   IVF noted     MEDICATIONS  (STANDING):  aspirin enteric coated 81 milliGRAM(s) Oral daily  clobetasol 0.05% Cream 1 Application(s) Topical two times a day  dextrose 5% + sodium chloride 0.9%. 1000 milliLiter(s) (70 mL/Hr) IV Continuous <Continuous>  donepezil 5 milliGRAM(s) Oral <User Schedule>  heparin   Injectable 5000 Unit(s) SubCutaneous every 12 hours  QUEtiapine 12.5 milliGRAM(s) Oral daily  QUEtiapine 25 milliGRAM(s) Oral at bedtime  sertraline 100 milliGRAM(s) Oral daily  tamsulosin 0.8 milliGRAM(s) Oral at bedtime    MEDICATIONS  (PRN):  acetaminophen     Tablet .. 650 milliGRAM(s) Oral every 6 hours PRN Temp greater or equal to 38C (100.4F), Mild Pain (1 - 3)  aluminum hydroxide/magnesium hydroxide/simethicone Suspension 30 milliLiter(s) Oral every 4 hours PRN Dyspepsia  hydrALAZINE Injectable 5 milliGRAM(s) IV Push every 6 hours PRN sbp>159  melatonin 3 milliGRAM(s) Oral at bedtime PRN Insomnia  OLANZapine Injectable 2.5 milliGRAM(s) IntraMuscular every 8 hours PRN anxiety  ondansetron Injectable 4 milliGRAM(s) IV Push every 8 hours PRN Nausea and/or Vomiting      · 	tamsulosin 0.4 mg oral capsule: Last Dose Taken:  , 2 cap(s) orally once a day (at bedtime)  · 	clindamycin 1% topical lotion: Last Dose Taken:  , Apply topically to affected area 2 times a day back  · 	Seroquel 25 mg oral tablet: Last Dose Taken:  , 0.5 tab(s) orally once a day morning  · 	aspirin 81 mg oral tablet: Last Dose Taken:  , 1 tab(s) orally once a day  · 	lisinopril 40 mg oral tablet: Last Dose Taken:  , 1 tab(s) orally once a day  · 	sertraline 50 mg oral tablet: Last Dose Taken:  , 2 tab(s) orally once a day  · 	betamethasone dipropionate 0.05% topical cream: Last Dose Taken:  , Apply topically to affected area 2 times a day  · 	Aricept 5 mg oral tablet: Last Dose Taken:  , 1 tab(s) orally 2 times a day  · 	Seroquel 25 mg oral tablet: Last Dose Taken:  , 1 tab(s) orally once a day (at bedtime)  · 	Zetia 10 mg oral ta    Allergies    No Known Allergies    Intolerances        Vital Signs Last 24 Hrs  T(C): 36.7 (10 May 2024 09:12), Max: 36.8 (10 May 2024 00:52)  T(F): 98 (10 May 2024 09:12), Max: 98.2 (10 May 2024 00:52)  HR: 81 (10 May 2024 09:12) (72 - 88)  BP: 149/94 (10 May 2024 09:12) (108/69 - 149/94)  BP(mean): --  RR: 20 (10 May 2024 09:12) (16 - 20)  SpO2: 99% (10 May 2024 09:12) (95% - 99%)    Parameters below as of 10 May 2024 09:12  Patient On (Oxygen Delivery Method): room air      Daily     Daily Weight in k.8 (10 May 2024 05:37)  I&O's Detail    09 May 2024 07:01  -  10 May 2024 07:00  --------------------------------------------------------  IN:    dextrose 5% + sodium chloride 0.9%: 840 mL  Total IN: 840 mL    OUT:    Intermittent Catheterization - Urethral (mL): 500 mL  Total OUT: 500 mL    Total NET: 340 mL      10 May 2024 07:01  -  10 May 2024 15:51  --------------------------------------------------------  IN:    dextrose 5% + sodium chloride 0.9%: 490 mL    Oral Fluid: 480 mL  Total IN: 970 mL    OUT:  Total OUT: 0 mL    Total NET: 970 mL        I&O's Summary    09 May 2024 07:01  -  10 May 2024 07:00  --------------------------------------------------------  IN: 840 mL / OUT: 500 mL / NET: 340 mL    10 May 2024 07:01  -  10 May 2024 15:51  --------------------------------------------------------  IN: 970 mL / OUT: 0 mL / NET: 970 mL        PHYSICAL EXAM:      GENERAL: Appears comfortable  HEAD:  Atraumatic, Normocephalic  EYES: Conjunctiva and sclera clear  ENMT: No periorbital edema  NECK: Supple, No JVD  NERVOUS SYSTEM:  Awake but not oriented; + dementia  CHEST/LUNG: Clear B/L  HEART: No rub  ABDOMEN: Soft, Nontender, +BS  EXTREMITIES: + LE edema bilaterally  LABS:                        13.2   8.02  )-----------( 275      ( 10 May 2024 05:56 )             39.0     05-10    137  |  106  |  33.3<H>  ----------------------------<  94  4.2   |  19.0<L>  |  1.54<H>    Ca    8.5      10 May 2024 05:56    TPro  6.3<L>  /  Alb  4.0  /  TBili  0.4  /  DBili  x   /  AST  17  /  ALT  29  /  AlkPhos  75  05-09    PT/INR - ( 09 May 2024 11:02 )   PT: 11.3 sec;   INR: 1.02 ratio         PTT - ( 09 May 2024 11:02 )  PTT:30.8 sec  Urinalysis Basic - ( 10 May 2024 05:56 )    Color: x / Appearance: x / SG: x / pH: x  Gluc: 94 mg/dL / Ketone: x  / Bili: x / Urobili: x   Blood: x / Protein: x / Nitrite: x   Leuk Esterase: x / RBC: x / WBC x   Sq Epi: x / Non Sq Epi: x / Bacteria: x        < from: US Kidney and Bladder (23 @ 13:38) >    ACC: [id number] EXAM:  US KIDNEYS AND BLADDER   ORDERED BY: CESAR TATE     PROCEDURE DATE:  2023          INTERPRETATION:  CLINICAL INFORMATION: Acute renal failure    COMPARISON: Renal ultrasound 7/10/2023    TECHNIQUE: Sonography ofthe kidneys and bladder.    FINDINGS:  Right kidney: 14.1 cm. No renal mass, hydronephrosis or calculi. Simple   interpolar cyst measures 4.7 cm.    Left kidney: 9.3 cm. slightly echogenic. Multiple simple cysts measure up   to 2.1 cm. No renal mass,hydronephrosis or calculi.    Urinary bladder: Within normal limits. Bilateral jets are visualized.   Prevoid volume of 296 cc. Patient was unable to void.    IMPRESSION:  Echogenic atrophic left kidney compatible with chronic medical renal   disease.    No hydronephrosis of either kidney.    Patient was unable to void.        --- End of Report ---      < end of copied text >        RADIOLOGY & ADDITIONAL TESTS:

## 2024-05-10 NOTE — PATIENT PROFILE ADULT - FUNCTIONAL SCREEN CURRENT LEVEL: COMMUNICATION, MLM
“You can access the Follow[facility] Patient Portal, offered by [facility], by registering with the following website: [email or web address]” 2 = difficulty understanding (not related to language barrier)

## 2024-05-11 LAB
ANION GAP SERPL CALC-SCNC: 9 MMOL/L — SIGNIFICANT CHANGE UP (ref 5–17)
BUN SERPL-MCNC: 23.4 MG/DL — HIGH (ref 8–20)
CALCIUM SERPL-MCNC: 8.4 MG/DL — SIGNIFICANT CHANGE UP (ref 8.4–10.5)
CHLORIDE SERPL-SCNC: 109 MMOL/L — HIGH (ref 96–108)
CO2 SERPL-SCNC: 23 MMOL/L — SIGNIFICANT CHANGE UP (ref 22–29)
CREAT SERPL-MCNC: 1.57 MG/DL — HIGH (ref 0.5–1.3)
EGFR: 46 ML/MIN/1.73M2 — LOW
GLUCOSE SERPL-MCNC: 87 MG/DL — SIGNIFICANT CHANGE UP (ref 70–99)
HCT VFR BLD CALC: 41.2 % — SIGNIFICANT CHANGE UP (ref 39–50)
HGB BLD-MCNC: 13.5 G/DL — SIGNIFICANT CHANGE UP (ref 13–17)
MAGNESIUM SERPL-MCNC: 1.9 MG/DL — SIGNIFICANT CHANGE UP (ref 1.8–2.6)
MCHC RBC-ENTMCNC: 31.8 PG — SIGNIFICANT CHANGE UP (ref 27–34)
MCHC RBC-ENTMCNC: 32.8 GM/DL — SIGNIFICANT CHANGE UP (ref 32–36)
MCV RBC AUTO: 97.2 FL — SIGNIFICANT CHANGE UP (ref 80–100)
PLATELET # BLD AUTO: 268 K/UL — SIGNIFICANT CHANGE UP (ref 150–400)
POTASSIUM SERPL-MCNC: 4.3 MMOL/L — SIGNIFICANT CHANGE UP (ref 3.5–5.3)
POTASSIUM SERPL-SCNC: 4.3 MMOL/L — SIGNIFICANT CHANGE UP (ref 3.5–5.3)
RBC # BLD: 4.24 M/UL — SIGNIFICANT CHANGE UP (ref 4.2–5.8)
RBC # FLD: 12.5 % — SIGNIFICANT CHANGE UP (ref 10.3–14.5)
SODIUM SERPL-SCNC: 141 MMOL/L — SIGNIFICANT CHANGE UP (ref 135–145)
WBC # BLD: 8.33 K/UL — SIGNIFICANT CHANGE UP (ref 3.8–10.5)
WBC # FLD AUTO: 8.33 K/UL — SIGNIFICANT CHANGE UP (ref 3.8–10.5)

## 2024-05-11 PROCEDURE — 76775 US EXAM ABDO BACK WALL LIM: CPT | Mod: 26

## 2024-05-11 PROCEDURE — 99232 SBSQ HOSP IP/OBS MODERATE 35: CPT

## 2024-05-11 RX ORDER — SODIUM CHLORIDE 9 MG/ML
1000 INJECTION, SOLUTION INTRAVENOUS
Refills: 0 | Status: DISCONTINUED | OUTPATIENT
Start: 2024-05-11 | End: 2024-05-14

## 2024-05-11 RX ADMIN — HEPARIN SODIUM 5000 UNIT(S): 5000 INJECTION INTRAVENOUS; SUBCUTANEOUS at 16:46

## 2024-05-11 RX ADMIN — DONEPEZIL HYDROCHLORIDE 5 MILLIGRAM(S): 10 TABLET, FILM COATED ORAL at 22:00

## 2024-05-11 RX ADMIN — Medication 650 MILLIGRAM(S): at 16:45

## 2024-05-11 RX ADMIN — SERTRALINE 100 MILLIGRAM(S): 25 TABLET, FILM COATED ORAL at 08:50

## 2024-05-11 RX ADMIN — Medication 81 MILLIGRAM(S): at 08:50

## 2024-05-11 RX ADMIN — Medication 1000 UNIT(S): at 08:50

## 2024-05-11 RX ADMIN — SODIUM CHLORIDE 75 MILLILITER(S): 9 INJECTION, SOLUTION INTRAVENOUS at 11:56

## 2024-05-11 RX ADMIN — Medication 650 MILLIGRAM(S): at 05:15

## 2024-05-11 RX ADMIN — QUETIAPINE FUMARATE 25 MILLIGRAM(S): 200 TABLET, FILM COATED ORAL at 22:46

## 2024-05-11 RX ADMIN — TAMSULOSIN HYDROCHLORIDE 0.8 MILLIGRAM(S): 0.4 CAPSULE ORAL at 22:46

## 2024-05-11 RX ADMIN — Medication 1 APPLICATION(S): at 16:46

## 2024-05-11 RX ADMIN — HEPARIN SODIUM 5000 UNIT(S): 5000 INJECTION INTRAVENOUS; SUBCUTANEOUS at 05:15

## 2024-05-11 RX ADMIN — Medication 1 APPLICATION(S): at 05:16

## 2024-05-11 RX ADMIN — DONEPEZIL HYDROCHLORIDE 5 MILLIGRAM(S): 10 TABLET, FILM COATED ORAL at 08:51

## 2024-05-11 RX ADMIN — QUETIAPINE FUMARATE 12.5 MILLIGRAM(S): 200 TABLET, FILM COATED ORAL at 08:50

## 2024-05-11 NOTE — DIETITIAN INITIAL EVALUATION ADULT - OTHER INFO
72 y/o male PMH of CKD st 3, Hyperlipidemia, fecal/urinary incontinence),hx  Enterovirus infection, Falls, Hx of Dementia, HTN, hx elevated PSA,hx recurrent fall with unsteady gait, presenting to the ER with progressive decline mental status with worsening functional status, Also noticing some swelling in his legs. Pt was seen by psychiatry, increased zoloft Seroquel doses.   Family having a more difficult time taking care of him. Family denies any fever,chill,cough,cp,sob,n/v/d,Has Chronic urinary retention/ incontinent following urology. No acute change in po intake, no acute fall.

## 2024-05-11 NOTE — PROGRESS NOTE ADULT - SUBJECTIVE AND OBJECTIVE BOX
NEPHROLOGY INTERVAL HPI/OVERNIGHT EVENTS:    Feels better   No new events   Intermittent cath     MEDICATIONS  (STANDING):  aspirin enteric coated 81 milliGRAM(s) Oral daily  cholecalciferol 1000 Unit(s) Oral daily  clobetasol 0.05% Cream 1 Application(s) Topical two times a day  dextrose 5% + sodium chloride 0.45%. 1000 milliLiter(s) (75 mL/Hr) IV Continuous <Continuous>  donepezil 5 milliGRAM(s) Oral <User Schedule>  heparin   Injectable 5000 Unit(s) SubCutaneous every 12 hours  QUEtiapine 12.5 milliGRAM(s) Oral daily  QUEtiapine 25 milliGRAM(s) Oral at bedtime  sertraline 100 milliGRAM(s) Oral daily  sodium bicarbonate 650 milliGRAM(s) Oral two times a day  tamsulosin 0.8 milliGRAM(s) Oral at bedtime    MEDICATIONS  (PRN):  acetaminophen     Tablet .. 650 milliGRAM(s) Oral every 6 hours PRN Temp greater or equal to 38C (100.4F), Mild Pain (1 - 3)  aluminum hydroxide/magnesium hydroxide/simethicone Suspension 30 milliLiter(s) Oral every 4 hours PRN Dyspepsia  hydrALAZINE Injectable 5 milliGRAM(s) IV Push every 6 hours PRN sbp>159  melatonin 3 milliGRAM(s) Oral at bedtime PRN Insomnia  OLANZapine Injectable 2.5 milliGRAM(s) IntraMuscular every 8 hours PRN anxiety  ondansetron Injectable 4 milliGRAM(s) IV Push every 8 hours PRN Nausea and/or Vomiting      Allergies    No Known Allergies    Intolerances        Vital Signs Last 24 Hrs  T(C): 36.7 (11 May 2024 17:24), Max: 36.7 (11 May 2024 17:24)  T(F): 98.1 (11 May 2024 17:24), Max: 98.1 (11 May 2024 17:24)  HR: 76 (11 May 2024 17:24) (67 - 76)  BP: 125/76 (11 May 2024 17:24) (120/73 - 125/76)  BP(mean): --  RR: 18 (11 May 2024 17:24) (18 - 18)  SpO2: 95% (11 May 2024 17:24) (95% - 97%)    Parameters below as of 11 May 2024 17:24  Patient On (Oxygen Delivery Method): room air      Daily     Daily Weight in k (11 May 2024 05:00)  I&O's Detail    10 May 2024 07:  -  11 May 2024 07:00  --------------------------------------------------------  IN:    dextrose 5% + sodium chloride 0.9%: 560 mL    Oral Fluid: 1060 mL  Total IN: 1620 mL    OUT:    Voided (mL): 1 mL  Total OUT: 1 mL    Total NET: 1619 mL      11 May 2024 07:  -  11 May 2024 20:52  --------------------------------------------------------  IN:    dextrose 5% + sodium chloride 0.45%: 600 mL    Oral Fluid: 680 mL  Total IN: 1280 mL    OUT:    Intermittent Catheterization - Urethral (mL): 650 mL    Post-Void Residual per Intermittent Catheterization (mL): 0 mL    Voided (mL): 720 mL  Total OUT: 1370 mL    Total NET: -90 mL        I&O's Summary    10 May 2024 07:  -  11 May 2024 07:00  --------------------------------------------------------  IN: 1620 mL / OUT: 1 mL / NET: 1619 mL    11 May 2024 07:  -  11 May 2024 20:52  --------------------------------------------------------  IN: 1280 mL / OUT: 1370 mL / NET: -90 mL        PHYSICAL EXAM:      GENERAL: Appears comfortable  HEAD:  Atraumatic, Normocephalic  EYES: Conjunctiva and sclera clear  ENMT: No periorbital edema  NECK: Supple, No JVD  NERVOUS SYSTEM:  Awake but not oriented; + dementia  CHEST/LUNG: Clear B/L  HEART: No rub  ABDOMEN: Soft, Nontender, +BS  EXTREMITIES: + LE edema bilaterally  LABS:                        13.5   8.33  )-----------( 268      ( 11 May 2024 06:14 )             41.2         141  |  109<H>  |  23.4<H>  ----------------------------<  87  4.3   |  23.0  |  1.57<H>    Ca    8.4      11 May 2024 06:14  Mg     1.9             Urinalysis Basic - ( 11 May 2024 06:14 )    Color: x / Appearance: x / SG: x / pH: x  Gluc: 87 mg/dL / Ketone: x  / Bili: x / Urobili: x   Blood: x / Protein: x / Nitrite: x   Leuk Esterase: x / RBC: x / WBC x   Sq Epi: x / Non Sq Epi: x / Bacteria: x      Magnesium: 1.9 mg/dL ( @ 06:14)          RADIOLOGY & ADDITIONAL TESTS:

## 2024-05-11 NOTE — DIETITIAN INITIAL EVALUATION ADULT - PERTINENT MEDS FT
[facility]  Hospitalist Progress Note        Arturo Bradley Wife Katelynn over the phone ([phone]). ; >MEDICATIONS  (STANDING):  cholecalciferol 1000 Unit(s) Oral daily  donepezil 5 milliGRAM(s) Oral <User Schedule>  QUEtiapine 12.5 milliGRAM(s) Oral daily  QUEtiapine 25 milliGRAM(s) Oral at bedtime  sertraline 100 milliGRAM(s) Oral daily  sodium bicarbonate 650 milliGRAM(s) Oral two times a day    MEDICATIONS  (PRN):  hydrALAZINE Injectable 5 milliGRAM(s) IV Push every 6 hours PRN sbp>159  OLANZapine Injectable 2.5 milliGRAM(s) IntraMuscular every 8 hours PRN anxiety  ondansetron Injectable 4 milliGRAM(s) IV Push every 8 hours PRN Nausea and/or Vomiting

## 2024-05-11 NOTE — DIETITIAN INITIAL EVALUATION ADULT - ADD RECOMMEND
Continue vitamin D supplementation, add Nephro-Daphnie daily. Encourage po intake, monitor diet tolerance, and provide assistance at meals as needed. Obtain daily weights to monitor trends.

## 2024-05-11 NOTE — DIETITIAN INITIAL EVALUATION ADULT - NS FNS DIET ORDER
Diet, Renal Restrictions:   For patients receiving Renal Replacement - No Protein Restr, No Conc K, No Conc Phos, Low Sodium (05-09-24 @ 13:21)

## 2024-05-11 NOTE — PROGRESS NOTE ADULT - ASSESSMENT
CKD(III): at baseline renal function  Profound dementia, progressive--> adult FTT  Dependent edema  Stop IVF   Atrophic L kidney on sono 12/23   CXR - No PVC or effusion   Repeat renal sono ordered       HTN - Lisinopril held   Watch trend off  Has a prn for Hydralazine       Chronic urinary retention/BPH  Watch bladder scans   Flomax daily    MA - Add oral Bicarb bid

## 2024-05-11 NOTE — PROGRESS NOTE ADULT - ASSESSMENT
73M with PMH of CKD3, HLD, HTN, fecal/urinary incontinence), Falls, Hx of Dementia,,hx recurrent fall with unsteady gait, presenting to the ER with progressive decline mental status with worsening functional status, Also noticing some swelling in his legs. Pt was seen by psychiatry, increased zoloft Seroquel doses. Family having a more difficult time taking care of him.    EMANUEL on CKD stage 3b  Likely combination of poor oral intake and urinary obstruction  Baseline 1.5  cont IVF , Na bicarbonate   -Bladder scan q6h  -Renal us with distended bladder.   -Hold ACEI    Metabolic acidosis   cont IVF   Na bicarbonate 500mg bid   monitor     Progressive decline cognitive function likely from progression of Dementia  -No sign of infection, cxr/UA neg   -Aricept Seroquel, Sertraline   zyprexa prn for behavioral issues     Recurrent fall  -CTH,C-SPINE no fracture   -fall precautions  -PT eval    Legs swelling  -LE doppler neg for DVT     HLP  -Statin     HTN   -Hold Lisinopril  -bp soft      GI PPX PPI  DVT PPX sq Heparin  c/s nutrition,  Pt evaluation

## 2024-05-11 NOTE — PROGRESS NOTE ADULT - SUBJECTIVE AND OBJECTIVE BOX
[facility] Division of Hospital Medicine    SUBJECTIVE / OVERNIGHT EVENTS:  No events    Patient denies chest pain, SOB, abd pain, N/V, fever, chills, dysuria or any other complaints. All remainder ROS negative.     MEDICATIONS  (STANDING):  aspirin enteric coated 81 milliGRAM(s) Oral daily  cholecalciferol 1000 Unit(s) Oral daily  clobetasol 0.05% Cream 1 Application(s) Topical two times a day  dextrose 5% + sodium chloride 0.45%. 1000 milliLiter(s) (75 mL/Hr) IV Continuous <Continuous>  donepezil 5 milliGRAM(s) Oral <User Schedule>  heparin   Injectable 5000 Unit(s) SubCutaneous every 12 hours  QUEtiapine 12.5 milliGRAM(s) Oral daily  QUEtiapine 25 milliGRAM(s) Oral at bedtime  sertraline 100 milliGRAM(s) Oral daily  sodium bicarbonate 650 milliGRAM(s) Oral two times a day  tamsulosin 0.8 milliGRAM(s) Oral at bedtime    MEDICATIONS  (PRN):  acetaminophen     Tablet .. 650 milliGRAM(s) Oral every 6 hours PRN Temp greater or equal to 38C (100.4F), Mild Pain (1 - 3)  aluminum hydroxide/magnesium hydroxide/simethicone Suspension 30 milliLiter(s) Oral every 4 hours PRN Dyspepsia  hydrALAZINE Injectable 5 milliGRAM(s) IV Push every 6 hours PRN sbp>159  melatonin 3 milliGRAM(s) Oral at bedtime PRN Insomnia  OLANZapine Injectable 2.5 milliGRAM(s) IntraMuscular every 8 hours PRN anxiety  ondansetron Injectable 4 milliGRAM(s) IV Push every 8 hours PRN Nausea and/or Vomiting        I&O's Summary    10 May 2024 07:01  -  11 May 2024 07:00  --------------------------------------------------------  IN: 1620 mL / OUT: 1 mL / NET: 1619 mL    11 May 2024 07:01  -  11 May 2024 14:57  --------------------------------------------------------  IN: 780 mL / OUT: 720 mL / NET: 60 mL        PHYSICAL EXAM:  Vital Signs Last 24 Hrs  T(C): 36.6 (11 May 2024 08:09), Max: 36.7 (10 May 2024 17:17)  T(F): 97.8 (11 May 2024 08:09), Max: 98.1 (10 May 2024 17:17)  HR: 67 (11 May 2024 08:09) (67 - 80)  BP: 120/73 (11 May 2024 08:09) (114/60 - 134/80)  BP(mean): --  RR: 18 (11 May 2024 08:09) (18 - 20)  SpO2: 97% (11 May 2024 08:09) (95% - 97%)    Parameters below as of 11 May 2024 08:09  Patient On (Oxygen Delivery Method): room air            CONSTITUTIONAL: NAD, appears stated age  ENMT: Moist oral mucosa, no pharyngeal injection or exudates; normal dentition  RESPIRATORY: Normal respiratory effort; clear to auscultation bilaterally  CARDIOVASCULAR: Regular rate and rhythm, normal S1 and S2, no murmur/rub/gallop; Peripheral pulses are 2+ bilaterally  ABDOMEN: Nontender to palpation, normoactive bowel sounds, no rebound/guarding;   MUSCLOSKELETAL:  No clubbing or cyanosis of digits; no joint swelling or tenderness to palpation  PSYCH: A+O to person, place, and time; affect appropriate  NEUROLOGY: CN 2-12 are intact and symmetric; no gross sensory deficits;   SKIN: No rashes; no palpable lesions    LABS:                        13.5   8.33  )-----------( 268      ( 11 May 2024 06:14 )             41.2     05-11    141  |  109<H>  |  23.4<H>  ----------------------------<  87  4.3   |  23.0  |  1.57<H>    Ca    8.4      11 May 2024 06:14  Mg     1.9     05-11            Urinalysis Basic - ( 11 May 2024 06:14 )    Color: x / Appearance: x / SG: x / pH: x  Gluc: 87 mg/dL / Ketone: x  / Bili: x / Urobili: x   Blood: x / Protein: x / Nitrite: x   Leuk Esterase: x / RBC: x / WBC x   Sq Epi: x / Non Sq Epi: x / Bacteria: x        Culture - Urine (collected 09 May 2024 12:57)  Source: Clean Catch Clean Catch (Midstream)  Final Report (10 May 2024 15:24):    No growth      CAPILLARY BLOOD GLUCOSE            RADIOLOGY & ADDITIONAL TESTS:  Results Reviewed:   Imaging Personally Reviewed:  Electrocardiogram Personally Reviewed:

## 2024-05-11 NOTE — DIETITIAN INITIAL EVALUATION ADULT - ORAL INTAKE PTA/DIET HISTORY
Nutrition assessment completed. Pt sleeping, noted confused with progression of dementia. No family present at bedside. Aware seen by SLP 5/10 with recommendations for a regular diet with thin liquids. Spoke to 1:1 at bedside, states pt did not yet receive breakfast and uncertain of how pt has been eating. RD to follow up as feasible.

## 2024-05-11 NOTE — DIETITIAN INITIAL EVALUATION ADULT - PERTINENT LABORATORY DATA
05-11    141  |  109<H>  |  23.4<H>  ----------------------------<  87  4.3   |  23.0  |  1.57<H>    Ca    8.4      11 May 2024 06:14  Mg     1.9     05-11    TPro  6.3<L>  /  Alb  4.0  /  TBili  0.4  /  DBili  x   /  AST  17  /  ALT  29  /  AlkPhos  75  05-09

## 2024-05-12 LAB
ANION GAP SERPL CALC-SCNC: 9 MMOL/L — SIGNIFICANT CHANGE UP (ref 5–17)
BASOPHILS # BLD AUTO: 0.06 K/UL — SIGNIFICANT CHANGE UP (ref 0–0.2)
BASOPHILS NFR BLD AUTO: 0.8 % — SIGNIFICANT CHANGE UP (ref 0–2)
BUN SERPL-MCNC: 23.5 MG/DL — HIGH (ref 8–20)
CALCIUM SERPL-MCNC: 8.4 MG/DL — SIGNIFICANT CHANGE UP (ref 8.4–10.5)
CHLORIDE SERPL-SCNC: 106 MMOL/L — SIGNIFICANT CHANGE UP (ref 96–108)
CO2 SERPL-SCNC: 21 MMOL/L — LOW (ref 22–29)
CREAT SERPL-MCNC: 1.66 MG/DL — HIGH (ref 0.5–1.3)
EGFR: 43 ML/MIN/1.73M2 — LOW
EOSINOPHIL # BLD AUTO: 0.32 K/UL — SIGNIFICANT CHANGE UP (ref 0–0.5)
EOSINOPHIL NFR BLD AUTO: 4.3 % — SIGNIFICANT CHANGE UP (ref 0–6)
GLUCOSE SERPL-MCNC: 95 MG/DL — SIGNIFICANT CHANGE UP (ref 70–99)
HCT VFR BLD CALC: 37.8 % — LOW (ref 39–50)
HGB BLD-MCNC: 12.9 G/DL — LOW (ref 13–17)
IMM GRANULOCYTES NFR BLD AUTO: 0.1 % — SIGNIFICANT CHANGE UP (ref 0–0.9)
LYMPHOCYTES # BLD AUTO: 1.79 K/UL — SIGNIFICANT CHANGE UP (ref 1–3.3)
LYMPHOCYTES # BLD AUTO: 24.3 % — SIGNIFICANT CHANGE UP (ref 13–44)
MAGNESIUM SERPL-MCNC: 1.9 MG/DL — SIGNIFICANT CHANGE UP (ref 1.6–2.6)
MCHC RBC-ENTMCNC: 32.3 PG — SIGNIFICANT CHANGE UP (ref 27–34)
MCHC RBC-ENTMCNC: 34.1 GM/DL — SIGNIFICANT CHANGE UP (ref 32–36)
MCV RBC AUTO: 94.5 FL — SIGNIFICANT CHANGE UP (ref 80–100)
MONOCYTES # BLD AUTO: 0.84 K/UL — SIGNIFICANT CHANGE UP (ref 0–0.9)
MONOCYTES NFR BLD AUTO: 11.4 % — SIGNIFICANT CHANGE UP (ref 2–14)
NEUTROPHILS # BLD AUTO: 4.35 K/UL — SIGNIFICANT CHANGE UP (ref 1.8–7.4)
NEUTROPHILS NFR BLD AUTO: 59.1 % — SIGNIFICANT CHANGE UP (ref 43–77)
PHOSPHATE SERPL-MCNC: 3.1 MG/DL — SIGNIFICANT CHANGE UP (ref 2.4–4.7)
PLATELET # BLD AUTO: 247 K/UL — SIGNIFICANT CHANGE UP (ref 150–400)
POTASSIUM SERPL-MCNC: 4.3 MMOL/L — SIGNIFICANT CHANGE UP (ref 3.5–5.3)
POTASSIUM SERPL-SCNC: 4.3 MMOL/L — SIGNIFICANT CHANGE UP (ref 3.5–5.3)
RBC # BLD: 4 M/UL — LOW (ref 4.2–5.8)
RBC # FLD: 12.4 % — SIGNIFICANT CHANGE UP (ref 10.3–14.5)
SODIUM SERPL-SCNC: 136 MMOL/L — SIGNIFICANT CHANGE UP (ref 135–145)
WBC # BLD: 7.37 K/UL — SIGNIFICANT CHANGE UP (ref 3.8–10.5)
WBC # FLD AUTO: 7.37 K/UL — SIGNIFICANT CHANGE UP (ref 3.8–10.5)

## 2024-05-12 PROCEDURE — 99232 SBSQ HOSP IP/OBS MODERATE 35: CPT

## 2024-05-12 RX ORDER — ERYTHROMYCIN BASE 5 MG/GRAM
1 OINTMENT (GRAM) OPHTHALMIC (EYE)
Refills: 0 | Status: DISCONTINUED | OUTPATIENT
Start: 2024-05-12 | End: 2024-05-13

## 2024-05-12 RX ORDER — SENNA PLUS 8.6 MG/1
2 TABLET ORAL AT BEDTIME
Refills: 0 | Status: DISCONTINUED | OUTPATIENT
Start: 2024-05-12 | End: 2024-05-14

## 2024-05-12 RX ORDER — POLYETHYLENE GLYCOL 3350 17 G/17G
17 POWDER, FOR SOLUTION ORAL DAILY
Refills: 0 | Status: DISCONTINUED | OUTPATIENT
Start: 2024-05-12 | End: 2024-05-14

## 2024-05-12 RX ADMIN — Medication 1 APPLICATION(S): at 07:48

## 2024-05-12 RX ADMIN — QUETIAPINE FUMARATE 12.5 MILLIGRAM(S): 200 TABLET, FILM COATED ORAL at 11:03

## 2024-05-12 RX ADMIN — HEPARIN SODIUM 5000 UNIT(S): 5000 INJECTION INTRAVENOUS; SUBCUTANEOUS at 17:05

## 2024-05-12 RX ADMIN — DONEPEZIL HYDROCHLORIDE 5 MILLIGRAM(S): 10 TABLET, FILM COATED ORAL at 11:03

## 2024-05-12 RX ADMIN — OLANZAPINE 2.5 MILLIGRAM(S): 15 TABLET, FILM COATED ORAL at 21:13

## 2024-05-12 RX ADMIN — DONEPEZIL HYDROCHLORIDE 5 MILLIGRAM(S): 10 TABLET, FILM COATED ORAL at 21:15

## 2024-05-12 RX ADMIN — SERTRALINE 100 MILLIGRAM(S): 25 TABLET, FILM COATED ORAL at 11:03

## 2024-05-12 RX ADMIN — QUETIAPINE FUMARATE 25 MILLIGRAM(S): 200 TABLET, FILM COATED ORAL at 21:16

## 2024-05-12 RX ADMIN — Medication 1 APPLICATION(S): at 17:05

## 2024-05-12 RX ADMIN — Medication 650 MILLIGRAM(S): at 17:06

## 2024-05-12 RX ADMIN — TAMSULOSIN HYDROCHLORIDE 0.8 MILLIGRAM(S): 0.4 CAPSULE ORAL at 21:15

## 2024-05-12 RX ADMIN — Medication 1000 UNIT(S): at 11:03

## 2024-05-12 RX ADMIN — Medication 81 MILLIGRAM(S): at 11:03

## 2024-05-12 RX ADMIN — POLYETHYLENE GLYCOL 3350 17 GRAM(S): 17 POWDER, FOR SOLUTION ORAL at 17:04

## 2024-05-12 RX ADMIN — Medication 1 APPLICATION(S): at 21:17

## 2024-05-12 NOTE — PROGRESS NOTE ADULT - SUBJECTIVE AND OBJECTIVE BOX
NEPHROLOGY INTERVAL HPI/OVERNIGHT EVENTS:    Feels better   Hollins placed due to large residuals   S    MEDICATIONS  (STANDING):  aspirin enteric coated 81 milliGRAM(s) Oral daily  cholecalciferol 1000 Unit(s) Oral daily  clobetasol 0.05% Cream 1 Application(s) Topical two times a day  dextrose 5% + sodium chloride 0.45%. 1000 milliLiter(s) (75 mL/Hr) IV Continuous <Continuous>  donepezil 5 milliGRAM(s) Oral <User Schedule>  erythromycin   Ointment 1 Application(s) Left EYE four times a day  heparin   Injectable 5000 Unit(s) SubCutaneous every 12 hours  polyethylene glycol 3350 17 Gram(s) Oral daily  QUEtiapine 12.5 milliGRAM(s) Oral daily  QUEtiapine 25 milliGRAM(s) Oral at bedtime  senna 2 Tablet(s) Oral at bedtime  sertraline 100 milliGRAM(s) Oral daily  sodium bicarbonate 650 milliGRAM(s) Oral two times a day  tamsulosin 0.8 milliGRAM(s) Oral at bedtime    MEDICATIONS  (PRN):  acetaminophen     Tablet .. 650 milliGRAM(s) Oral every 6 hours PRN Temp greater or equal to 38C (100.4F), Mild Pain (1 - 3)  aluminum hydroxide/magnesium hydroxide/simethicone Suspension 30 milliLiter(s) Oral every 4 hours PRN Dyspepsia  hydrALAZINE Injectable 5 milliGRAM(s) IV Push every 6 hours PRN sbp>159  melatonin 3 milliGRAM(s) Oral at bedtime PRN Insomnia  OLANZapine Injectable 2.5 milliGRAM(s) IntraMuscular every 8 hours PRN anxiety  ondansetron Injectable 4 milliGRAM(s) IV Push every 8 hours PRN Nausea and/or Vomiting      Allergies    No Known Allergies    Intolerances      Vital Signs Last 24 Hrs  T(C): 36.7 (12 May 2024 15:40), Max: 36.8 (11 May 2024 21:00)  T(F): 98.1 (12 May 2024 15:40), Max: 98.2 (11 May 2024 21:00)  HR: 86 (12 May 2024 15:40) (75 - 86)  BP: 123/66 (12 May 2024 15:40) (116/76 - 146/83)  BP(mean): --  RR: 18 (12 May 2024 15:40) (18 - 18)  SpO2: 98% (12 May 2024 15:40) (94% - 98%)    Parameters below as of 12 May 2024 15:40  Patient On (Oxygen Delivery Method): room air      Daily     Daily Weight in k.3 (12 May 2024 04:45)  I&O's Detail    11 May 2024 07:01  -  12 May 2024 07:00  --------------------------------------------------------  IN:    dextrose 5% + sodium chloride 0.45%: 1425 mL    Oral Fluid: 680 mL  Total IN: 2105 mL    OUT:    Incontinent per Condom Catheter (mL): 100 mL    Intermittent Catheterization - Urethral (mL): 1200 mL    Post-Void Residual per Intermittent Catheterization (mL): 0 mL    Voided (mL): 720 mL  Total OUT: 2020 mL    Total NET: 85 mL        I&O's Summary    11 May 2024 07:01  -  12 May 2024 07:00  --------------------------------------------------------  IN: 2105 mL / OUT: 2020 mL / NET: 85 mL        PHYSICAL EXAM:        GENERAL: Appears comfortable  HEAD:  Atraumatic, Normocephalic  EYES: Conjunctiva and sclera clear  ENMT: No periorbital edema  NECK: Supple, No JVD  NERVOUS SYSTEM:  Awake but not oriented; + dementia  CHEST/LUNG: Clear B/L  HEART: No rub  ABDOMEN: Soft, Nontender, +BS  EXTREMITIES: decreased  edema bilaterally    LABS:                        12.9   7.37  )-----------( 247      ( 12 May 2024 04:39 )             37.8     05-12    136  |  106  |  23.5<H>  ----------------------------<  95  4.3   |  21.0<L>  |  1.66<H>    Ca    8.4      12 May 2024 04:39  Phos  3.1       Mg     1.9     -12        Urinalysis Basic - ( 12 May 2024 04:39 )    Color: x / Appearance: x / SG: x / pH: x  Gluc: 95 mg/dL / Ketone: x  / Bili: x / Urobili: x   Blood: x / Protein: x / Nitrite: x   Leuk Esterase: x / RBC: x / WBC x   Sq Epi: x / Non Sq Epi: x / Bacteria: x      Phosphorus: 3.1 mg/dL ( @ 04:39)  Magnesium: 1.9 mg/dL ( @ 04:39)      < from: US Renal (24 @ 10:21) >    ACC: [id number] EXAM:  US KIDNEY(S)   ORDERED BY: LORI WOLFF DATE:  2024          INTERPRETATION:  CLINICAL INFORMATION: Urinary retention. Renal failure    COMPARISON: None available.    TECHNIQUE: Sonography of the kidneys and bladder.    FINDINGS:  Right kidney: 11.5 cm. No renal mass, hydronephrosis or calculi. 5.4 x   4.9 cm mid renal cyst    Left kidney: 8.6 cm. Atrophic echogenic kidney without renal mass,   hydronephrosis or calculi. 18 mm lower pole cyst    Urinary bladder: 2.3 cm LEFT lateral diverticulum. 445 cc distended   urinary bladder. Patient unable to void. Enlarged/72 cc prostate  .  IMPRESSION:  1.  Atrophic LEFT kidney. Normal size RIGHT kidney without obstructive   uropathy  2.  Distended urinary bladder with patient unable to void. Clinical   correlation with regard to urinary retention recommended        --- End of Report ---            JUSTINE ANDRE MD; Attending Radiologist  This d    < end of copied text >      RADIOLOGY & ADDITIONAL TESTS:

## 2024-05-12 NOTE — PROGRESS NOTE ADULT - ASSESSMENT
CKD(III): at baseline renal function  Profound dementia, progressive--> adult FTT  Dependent edema  Atrophic L kidney on sono 12/23   CXR - No PVC or effusion   Repeat renal sono ordered 5/11 - Results noted , No hydro       HTN - Lisinopril held   Watch trend off  Has a prn for Hydralazine     Chronic urinary retention/BPH  bladder scans noted   Flomax   Hollins had to be inserted --> Follow     MA - Added oral Bicarb bid

## 2024-05-13 ENCOUNTER — APPOINTMENT (OUTPATIENT)
Dept: INTERNAL MEDICINE | Facility: CLINIC | Age: 74
End: 2024-05-13

## 2024-05-13 LAB
ANION GAP SERPL CALC-SCNC: 13 MMOL/L — SIGNIFICANT CHANGE UP (ref 5–17)
BASOPHILS # BLD AUTO: 0.05 K/UL — SIGNIFICANT CHANGE UP (ref 0–0.2)
BASOPHILS NFR BLD AUTO: 0.6 % — SIGNIFICANT CHANGE UP (ref 0–2)
BUN SERPL-MCNC: 21 MG/DL — HIGH (ref 8–20)
CALCIUM SERPL-MCNC: 8.6 MG/DL — SIGNIFICANT CHANGE UP (ref 8.4–10.5)
CHLORIDE SERPL-SCNC: 107 MMOL/L — SIGNIFICANT CHANGE UP (ref 96–108)
CO2 SERPL-SCNC: 21 MMOL/L — LOW (ref 22–29)
CREAT SERPL-MCNC: 1.63 MG/DL — HIGH (ref 0.5–1.3)
EGFR: 44 ML/MIN/1.73M2 — LOW
EOSINOPHIL # BLD AUTO: 0.27 K/UL — SIGNIFICANT CHANGE UP (ref 0–0.5)
EOSINOPHIL NFR BLD AUTO: 3.2 % — SIGNIFICANT CHANGE UP (ref 0–6)
GLUCOSE SERPL-MCNC: 89 MG/DL — SIGNIFICANT CHANGE UP (ref 70–99)
HCT VFR BLD CALC: 41.1 % — SIGNIFICANT CHANGE UP (ref 39–50)
HGB BLD-MCNC: 13.9 G/DL — SIGNIFICANT CHANGE UP (ref 13–17)
IMM GRANULOCYTES NFR BLD AUTO: 0.2 % — SIGNIFICANT CHANGE UP (ref 0–0.9)
LYMPHOCYTES # BLD AUTO: 1.66 K/UL — SIGNIFICANT CHANGE UP (ref 1–3.3)
LYMPHOCYTES # BLD AUTO: 20 % — SIGNIFICANT CHANGE UP (ref 13–44)
MAGNESIUM SERPL-MCNC: 1.9 MG/DL — SIGNIFICANT CHANGE UP (ref 1.6–2.6)
MCHC RBC-ENTMCNC: 31.8 PG — SIGNIFICANT CHANGE UP (ref 27–34)
MCHC RBC-ENTMCNC: 33.8 GM/DL — SIGNIFICANT CHANGE UP (ref 32–36)
MCV RBC AUTO: 94.1 FL — SIGNIFICANT CHANGE UP (ref 80–100)
MONOCYTES # BLD AUTO: 0.81 K/UL — SIGNIFICANT CHANGE UP (ref 0–0.9)
MONOCYTES NFR BLD AUTO: 9.7 % — SIGNIFICANT CHANGE UP (ref 2–14)
NEUTROPHILS # BLD AUTO: 5.5 K/UL — SIGNIFICANT CHANGE UP (ref 1.8–7.4)
NEUTROPHILS NFR BLD AUTO: 66.3 % — SIGNIFICANT CHANGE UP (ref 43–77)
PHOSPHATE SERPL-MCNC: 2.8 MG/DL — SIGNIFICANT CHANGE UP (ref 2.4–4.7)
PLATELET # BLD AUTO: 269 K/UL — SIGNIFICANT CHANGE UP (ref 150–400)
POTASSIUM SERPL-MCNC: 4.2 MMOL/L — SIGNIFICANT CHANGE UP (ref 3.5–5.3)
POTASSIUM SERPL-SCNC: 4.2 MMOL/L — SIGNIFICANT CHANGE UP (ref 3.5–5.3)
RBC # BLD: 4.37 M/UL — SIGNIFICANT CHANGE UP (ref 4.2–5.8)
RBC # FLD: 12.2 % — SIGNIFICANT CHANGE UP (ref 10.3–14.5)
SODIUM SERPL-SCNC: 141 MMOL/L — SIGNIFICANT CHANGE UP (ref 135–145)
WBC # BLD: 8.31 K/UL — SIGNIFICANT CHANGE UP (ref 3.8–10.5)
WBC # FLD AUTO: 8.31 K/UL — SIGNIFICANT CHANGE UP (ref 3.8–10.5)

## 2024-05-13 PROCEDURE — 99232 SBSQ HOSP IP/OBS MODERATE 35: CPT

## 2024-05-13 RX ORDER — SODIUM CHLORIDE 9 MG/ML
1000 INJECTION INTRAMUSCULAR; INTRAVENOUS; SUBCUTANEOUS
Refills: 0 | Status: DISCONTINUED | OUTPATIENT
Start: 2024-05-13 | End: 2024-05-13

## 2024-05-13 RX ORDER — ERYTHROMYCIN BASE 5 MG/GRAM
1 OINTMENT (GRAM) OPHTHALMIC (EYE)
Refills: 0 | Status: DISCONTINUED | OUTPATIENT
Start: 2024-05-13 | End: 2024-05-14

## 2024-05-13 RX ADMIN — SODIUM CHLORIDE 75 MILLILITER(S): 9 INJECTION INTRAMUSCULAR; INTRAVENOUS; SUBCUTANEOUS at 09:16

## 2024-05-13 RX ADMIN — HEPARIN SODIUM 5000 UNIT(S): 5000 INJECTION INTRAVENOUS; SUBCUTANEOUS at 17:48

## 2024-05-13 RX ADMIN — Medication 81 MILLIGRAM(S): at 13:24

## 2024-05-13 RX ADMIN — DONEPEZIL HYDROCHLORIDE 5 MILLIGRAM(S): 10 TABLET, FILM COATED ORAL at 13:24

## 2024-05-13 RX ADMIN — HEPARIN SODIUM 5000 UNIT(S): 5000 INJECTION INTRAVENOUS; SUBCUTANEOUS at 06:42

## 2024-05-13 RX ADMIN — Medication 1 APPLICATION(S): at 17:52

## 2024-05-13 RX ADMIN — QUETIAPINE FUMARATE 25 MILLIGRAM(S): 200 TABLET, FILM COATED ORAL at 21:54

## 2024-05-13 RX ADMIN — Medication 1 APPLICATION(S): at 06:42

## 2024-05-13 RX ADMIN — Medication 1 APPLICATION(S): at 01:09

## 2024-05-13 RX ADMIN — Medication 650 MILLIGRAM(S): at 21:53

## 2024-05-13 RX ADMIN — QUETIAPINE FUMARATE 12.5 MILLIGRAM(S): 200 TABLET, FILM COATED ORAL at 13:23

## 2024-05-13 RX ADMIN — DONEPEZIL HYDROCHLORIDE 5 MILLIGRAM(S): 10 TABLET, FILM COATED ORAL at 21:53

## 2024-05-13 RX ADMIN — Medication 650 MILLIGRAM(S): at 06:42

## 2024-05-13 RX ADMIN — Medication 1000 UNIT(S): at 13:24

## 2024-05-13 RX ADMIN — SENNA PLUS 2 TABLET(S): 8.6 TABLET ORAL at 21:53

## 2024-05-13 RX ADMIN — SERTRALINE 100 MILLIGRAM(S): 25 TABLET, FILM COATED ORAL at 13:23

## 2024-05-13 RX ADMIN — TAMSULOSIN HYDROCHLORIDE 0.8 MILLIGRAM(S): 0.4 CAPSULE ORAL at 21:53

## 2024-05-13 NOTE — PROGRESS NOTE ADULT - SUBJECTIVE AND OBJECTIVE BOX
NEPHROLOGY INTERVAL HPI/OVERNIGHT EVENTS:    No events   Placed on IVF   Hollins clear     MEDICATIONS  (STANDING):  aspirin enteric coated 81 milliGRAM(s) Oral daily  cholecalciferol 1000 Unit(s) Oral daily  clobetasol 0.05% Cream 1 Application(s) Topical two times a day  dextrose 5% + sodium chloride 0.45%. 1000 milliLiter(s) (75 mL/Hr) IV Continuous <Continuous>  donepezil 5 milliGRAM(s) Oral <User Schedule>  erythromycin   Ointment 1 Application(s) Both EYES four times a day  heparin   Injectable 5000 Unit(s) SubCutaneous every 12 hours  polyethylene glycol 3350 17 Gram(s) Oral daily  QUEtiapine 12.5 milliGRAM(s) Oral daily  QUEtiapine 25 milliGRAM(s) Oral at bedtime  senna 2 Tablet(s) Oral at bedtime  sertraline 100 milliGRAM(s) Oral daily  sodium bicarbonate 650 milliGRAM(s) Oral two times a day  sodium chloride 0.9%. 1000 milliLiter(s) (75 mL/Hr) IV Continuous <Continuous>  tamsulosin 0.8 milliGRAM(s) Oral at bedtime    MEDICATIONS  (PRN):  acetaminophen     Tablet .. 650 milliGRAM(s) Oral every 6 hours PRN Temp greater or equal to 38C (100.4F), Mild Pain (1 - 3)  aluminum hydroxide/magnesium hydroxide/simethicone Suspension 30 milliLiter(s) Oral every 4 hours PRN Dyspepsia  hydrALAZINE Injectable 5 milliGRAM(s) IV Push every 6 hours PRN sbp>159  melatonin 3 milliGRAM(s) Oral at bedtime PRN Insomnia  ondansetron Injectable 4 milliGRAM(s) IV Push every 8 hours PRN Nausea and/or Vomiting      Allergies    No Known Allergies    Intolerances      Vital Signs Last 24 Hrs  T(C): 36.6 (13 May 2024 08:59), Max: 36.7 (12 May 2024 15:40)  T(F): 97.8 (13 May 2024 08:59), Max: 98.1 (12 May 2024 15:40)  HR: 69 (13 May 2024 08:59) (69 - 86)  BP: 137/88 (13 May 2024 08:59) (122/78 - 154/86)  BP(mean): --  RR: 15 (13 May 2024 08:59) (15 - 18)  SpO2: 94% (13 May 2024 08:59) (91% - 98%)    Parameters below as of 13 May 2024 08:59  Patient On (Oxygen Delivery Method): room air      Daily     Daily Weight in k.1 (13 May 2024 05:05)  I&O's Detail    12 May 2024 07:01  -  13 May 2024 07:00  --------------------------------------------------------  IN:    Oral Fluid: 420 mL  Total IN: 420 mL    OUT:    Indwelling Catheter - Urethral (mL): 1450 mL  Total OUT: 1450 mL    Total NET: -1030 mL      13 May 2024 07:01  -  13 May 2024 15:11  --------------------------------------------------------  IN:  Total IN: 0 mL    OUT:    Indwelling Catheter - Urethral (mL): 400 mL  Total OUT: 400 mL    Total NET: -400 mL        I&O's Summary    12 May 2024 07:01  -  13 May 2024 07:00  --------------------------------------------------------  IN: 420 mL / OUT: 1450 mL / NET: -1030 mL    13 May 2024 07:01  -  13 May 2024 15:11  --------------------------------------------------------  IN: 0 mL / OUT: 400 mL / NET: -400 mL        PHYSICAL EXAM:      GENERAL: Appears comfortable  HEAD:  Atraumatic, Normocephalic  EYES: Conjunctiva and sclera clear  ENMT: No periorbital edema  NECK: Supple, No JVD  NERVOUS SYSTEM:  Awake but not oriented; + dementia  CHEST/LUNG: Clear B/L  HEART: No rub  ABDOMEN: Soft, Nontender, +BS  EXTREMITIES: decreased  edema bilaterally      LABS:                        13.9   8.31  )-----------( 269      ( 13 May 2024 05:25 )             41.1         141  |  107  |  21.0<H>  ----------------------------<  89  4.2   |  21.0<L>  |  1.63<H>    Ca    8.6      13 May 2024 05:25  Phos  2.8       Mg     1.9             Urinalysis Basic - ( 13 May 2024 05:25 )    Color: x / Appearance: x / SG: x / pH: x  Gluc: 89 mg/dL / Ketone: x  / Bili: x / Urobili: x   Blood: x / Protein: x / Nitrite: x   Leuk Esterase: x / RBC: x / WBC x   Sq Epi: x / Non Sq Epi: x / Bacteria: x      Magnesium: 1.9 mg/dL ( @ 05:25)  Phosphorus: 2.8 mg/dL ( @ 05:25)          RADIOLOGY & ADDITIONAL TESTS:

## 2024-05-13 NOTE — PROGRESS NOTE ADULT - ASSESSMENT
CKD(III): at baseline renal function  Profound dementia, progressive--> adult FTT  Dependent edema  Atrophic L kidney on sono 12/23   CXR - No PVC or effusion   Repeat renal sono ordered 5/11 - Results noted , No hydro   Currently at baseline renal function - can stop IVF       HTN - Lisinopril held   Watch trend off  Has a prn for Hydralazine     Chronic urinary retention/BPH  bladder scans noted   Flomax   Hollins had to be inserted --> Follow     MA - Added oral Bicarb bid

## 2024-05-13 NOTE — PROGRESS NOTE ADULT - SUBJECTIVE AND OBJECTIVE BOX
[facility] Division of Hospital Medicine    SUBJECTIVE / OVERNIGHT EVENTS:  No events    Patient denies chest pain, SOB, abd pain, N/V, fever, chills, dysuria or any other complaints. All remainder ROS negative.     MEDICATIONS  (STANDING):  aspirin enteric coated 81 milliGRAM(s) Oral daily  cholecalciferol 1000 Unit(s) Oral daily  clobetasol 0.05% Cream 1 Application(s) Topical two times a day  dextrose 5% + sodium chloride 0.45%. 1000 milliLiter(s) (75 mL/Hr) IV Continuous <Continuous>  donepezil 5 milliGRAM(s) Oral <User Schedule>  erythromycin   Ointment 1 Application(s) Both EYES four times a day  heparin   Injectable 5000 Unit(s) SubCutaneous every 12 hours  polyethylene glycol 3350 17 Gram(s) Oral daily  QUEtiapine 12.5 milliGRAM(s) Oral daily  QUEtiapine 25 milliGRAM(s) Oral at bedtime  senna 2 Tablet(s) Oral at bedtime  sertraline 100 milliGRAM(s) Oral daily  sodium bicarbonate 650 milliGRAM(s) Oral two times a day  sodium chloride 0.9%. 1000 milliLiter(s) (75 mL/Hr) IV Continuous <Continuous>  tamsulosin 0.8 milliGRAM(s) Oral at bedtime    MEDICATIONS  (PRN):  acetaminophen     Tablet .. 650 milliGRAM(s) Oral every 6 hours PRN Temp greater or equal to 38C (100.4F), Mild Pain (1 - 3)  aluminum hydroxide/magnesium hydroxide/simethicone Suspension 30 milliLiter(s) Oral every 4 hours PRN Dyspepsia  hydrALAZINE Injectable 5 milliGRAM(s) IV Push every 6 hours PRN sbp>159  melatonin 3 milliGRAM(s) Oral at bedtime PRN Insomnia  ondansetron Injectable 4 milliGRAM(s) IV Push every 8 hours PRN Nausea and/or Vomiting        I&O's Summary    12 May 2024 07:01  -  13 May 2024 07:00  --------------------------------------------------------  IN: 420 mL / OUT: 1450 mL / NET: -1030 mL    13 May 2024 07:01  -  13 May 2024 14:28  --------------------------------------------------------  IN: 0 mL / OUT: 400 mL / NET: -400 mL        PHYSICAL EXAM:  Vital Signs Last 24 Hrs  T(C): 36.6 (13 May 2024 08:59), Max: 36.7 (12 May 2024 15:40)  T(F): 97.8 (13 May 2024 08:59), Max: 98.1 (12 May 2024 15:40)  HR: 69 (13 May 2024 08:59) (69 - 86)  BP: 137/88 (13 May 2024 08:59) (122/78 - 154/86)  BP(mean): --  RR: 15 (13 May 2024 08:59) (15 - 18)  SpO2: 94% (13 May 2024 08:59) (91% - 98%)    Parameters below as of 13 May 2024 08:59  Patient On (Oxygen Delivery Method): room air            CONSTITUTIONAL: NAD, appears stated age  ENMT: Moist oral mucosa, no pharyngeal injection or exudates; normal dentition  RESPIRATORY: Normal respiratory effort; clear to auscultation bilaterally  CARDIOVASCULAR: Regular rate and rhythm, normal S1 and S2, no murmur/rub/gallop; Peripheral pulses are 2+ bilaterally  ABDOMEN: Nontender to palpation, normoactive bowel sounds, no rebound/guarding;   MUSCLOSKELETAL:  No clubbing or cyanosis of digits; no joint swelling or tenderness to palpation  PSYCH: A+O to person, place, and time; affect appropriate  NEUROLOGY: CN 2-12 are intact and symmetric; no gross sensory deficits;   SKIN: No rashes; no palpable lesions    LABS:                        13.9   8.31  )-----------( 269      ( 13 May 2024 05:25 )             41.1     05-13    141  |  107  |  21.0<H>  ----------------------------<  89  4.2   |  21.0<L>  |  1.63<H>    Ca    8.6      13 May 2024 05:25  Phos  2.8     05-13  Mg     1.9     05-13            Urinalysis Basic - ( 13 May 2024 05:25 )    Color: x / Appearance: x / SG: x / pH: x  Gluc: 89 mg/dL / Ketone: x  / Bili: x / Urobili: x   Blood: x / Protein: x / Nitrite: x   Leuk Esterase: x / RBC: x / WBC x   Sq Epi: x / Non Sq Epi: x / Bacteria: x        CAPILLARY BLOOD GLUCOSE            RADIOLOGY & ADDITIONAL TESTS:  Results Reviewed:   Imaging Personally Reviewed:  Electrocardiogram Personally Reviewed:

## 2024-05-14 ENCOUNTER — NON-APPOINTMENT (OUTPATIENT)
Age: 74
End: 2024-05-14

## 2024-05-14 ENCOUNTER — TRANSCRIPTION ENCOUNTER (OUTPATIENT)
Age: 74
End: 2024-05-14

## 2024-05-14 VITALS
SYSTOLIC BLOOD PRESSURE: 129 MMHG | TEMPERATURE: 98 F | OXYGEN SATURATION: 96 % | DIASTOLIC BLOOD PRESSURE: 87 MMHG | RESPIRATION RATE: 18 BRPM | HEART RATE: 80 BPM

## 2024-05-14 LAB
ANION GAP SERPL CALC-SCNC: 14 MMOL/L — SIGNIFICANT CHANGE UP (ref 5–17)
BASOPHILS # BLD AUTO: 0.07 K/UL — SIGNIFICANT CHANGE UP (ref 0–0.2)
BASOPHILS NFR BLD AUTO: 0.8 % — SIGNIFICANT CHANGE UP (ref 0–2)
BUN SERPL-MCNC: 16.8 MG/DL — SIGNIFICANT CHANGE UP (ref 8–20)
CALCIUM SERPL-MCNC: 8.8 MG/DL — SIGNIFICANT CHANGE UP (ref 8.4–10.5)
CHLORIDE SERPL-SCNC: 104 MMOL/L — SIGNIFICANT CHANGE UP (ref 96–108)
CO2 SERPL-SCNC: 20 MMOL/L — LOW (ref 22–29)
CREAT SERPL-MCNC: 1.64 MG/DL — HIGH (ref 0.5–1.3)
EGFR: 44 ML/MIN/1.73M2 — LOW
EOSINOPHIL # BLD AUTO: 0.39 K/UL — SIGNIFICANT CHANGE UP (ref 0–0.5)
EOSINOPHIL NFR BLD AUTO: 4.6 % — SIGNIFICANT CHANGE UP (ref 0–6)
GLUCOSE SERPL-MCNC: 91 MG/DL — SIGNIFICANT CHANGE UP (ref 70–99)
HCT VFR BLD CALC: 43 % — SIGNIFICANT CHANGE UP (ref 39–50)
HGB BLD-MCNC: 14 G/DL — SIGNIFICANT CHANGE UP (ref 13–17)
IMM GRANULOCYTES NFR BLD AUTO: 0.2 % — SIGNIFICANT CHANGE UP (ref 0–0.9)
LYMPHOCYTES # BLD AUTO: 2.08 K/UL — SIGNIFICANT CHANGE UP (ref 1–3.3)
LYMPHOCYTES # BLD AUTO: 24.5 % — SIGNIFICANT CHANGE UP (ref 13–44)
MAGNESIUM SERPL-MCNC: 1.9 MG/DL — SIGNIFICANT CHANGE UP (ref 1.6–2.6)
MCHC RBC-ENTMCNC: 32.1 PG — SIGNIFICANT CHANGE UP (ref 27–34)
MCHC RBC-ENTMCNC: 32.6 GM/DL — SIGNIFICANT CHANGE UP (ref 32–36)
MCV RBC AUTO: 98.6 FL — SIGNIFICANT CHANGE UP (ref 80–100)
MONOCYTES # BLD AUTO: 0.87 K/UL — SIGNIFICANT CHANGE UP (ref 0–0.9)
MONOCYTES NFR BLD AUTO: 10.2 % — SIGNIFICANT CHANGE UP (ref 2–14)
NEUTROPHILS # BLD AUTO: 5.07 K/UL — SIGNIFICANT CHANGE UP (ref 1.8–7.4)
NEUTROPHILS NFR BLD AUTO: 59.7 % — SIGNIFICANT CHANGE UP (ref 43–77)
PHOSPHATE SERPL-MCNC: 3.3 MG/DL — SIGNIFICANT CHANGE UP (ref 2.4–4.7)
PLATELET # BLD AUTO: 267 K/UL — SIGNIFICANT CHANGE UP (ref 150–400)
POTASSIUM SERPL-MCNC: 4.2 MMOL/L — SIGNIFICANT CHANGE UP (ref 3.5–5.3)
POTASSIUM SERPL-SCNC: 4.2 MMOL/L — SIGNIFICANT CHANGE UP (ref 3.5–5.3)
RBC # BLD: 4.36 M/UL — SIGNIFICANT CHANGE UP (ref 4.2–5.8)
RBC # FLD: 12.3 % — SIGNIFICANT CHANGE UP (ref 10.3–14.5)
SODIUM SERPL-SCNC: 138 MMOL/L — SIGNIFICANT CHANGE UP (ref 135–145)
WBC # BLD: 8.5 K/UL — SIGNIFICANT CHANGE UP (ref 3.8–10.5)
WBC # FLD AUTO: 8.5 K/UL — SIGNIFICANT CHANGE UP (ref 3.8–10.5)

## 2024-05-14 PROCEDURE — 99285 EMERGENCY DEPT VISIT HI MDM: CPT | Mod: 25

## 2024-05-14 PROCEDURE — 72125 CT NECK SPINE W/O DYE: CPT | Mod: MC

## 2024-05-14 PROCEDURE — 84100 ASSAY OF PHOSPHORUS: CPT

## 2024-05-14 PROCEDURE — 76775 US EXAM ABDO BACK WALL LIM: CPT

## 2024-05-14 PROCEDURE — 87086 URINE CULTURE/COLONY COUNT: CPT

## 2024-05-14 PROCEDURE — 85025 COMPLETE CBC W/AUTO DIFF WBC: CPT

## 2024-05-14 PROCEDURE — 85730 THROMBOPLASTIN TIME PARTIAL: CPT

## 2024-05-14 PROCEDURE — 70450 CT HEAD/BRAIN W/O DYE: CPT | Mod: MC

## 2024-05-14 PROCEDURE — 93970 EXTREMITY STUDY: CPT

## 2024-05-14 PROCEDURE — 80048 BASIC METABOLIC PNL TOTAL CA: CPT

## 2024-05-14 PROCEDURE — 85610 PROTHROMBIN TIME: CPT

## 2024-05-14 PROCEDURE — 99239 HOSP IP/OBS DSCHRG MGMT >30: CPT

## 2024-05-14 PROCEDURE — 85027 COMPLETE CBC AUTOMATED: CPT

## 2024-05-14 PROCEDURE — 83735 ASSAY OF MAGNESIUM: CPT

## 2024-05-14 PROCEDURE — 80053 COMPREHEN METABOLIC PANEL: CPT

## 2024-05-14 PROCEDURE — 83880 ASSAY OF NATRIURETIC PEPTIDE: CPT

## 2024-05-14 PROCEDURE — 36415 COLL VENOUS BLD VENIPUNCTURE: CPT

## 2024-05-14 PROCEDURE — 81003 URINALYSIS AUTO W/O SCOPE: CPT

## 2024-05-14 PROCEDURE — 71045 X-RAY EXAM CHEST 1 VIEW: CPT

## 2024-05-14 PROCEDURE — 87637 SARSCOV2&INF A&B&RSV AMP PRB: CPT

## 2024-05-14 RX ORDER — SENNA PLUS 8.6 MG/1
2 TABLET ORAL
Qty: 0 | Refills: 0 | DISCHARGE
Start: 2024-05-14

## 2024-05-14 RX ORDER — POLYETHYLENE GLYCOL 3350 17 G/17G
17 POWDER, FOR SOLUTION ORAL
Qty: 0 | Refills: 0 | DISCHARGE
Start: 2024-05-14

## 2024-05-14 RX ORDER — QUETIAPINE FUMARATE 200 MG/1
1 TABLET, FILM COATED ORAL
Refills: 0 | DISCHARGE

## 2024-05-14 RX ORDER — QUETIAPINE FUMARATE 200 MG/1
12.5 TABLET, FILM COATED ORAL ONCE
Refills: 0 | Status: COMPLETED | OUTPATIENT
Start: 2024-05-14 | End: 2024-05-14

## 2024-05-14 RX ORDER — LANOLIN ALCOHOL/MO/W.PET/CERES
1 CREAM (GRAM) TOPICAL
Qty: 0 | Refills: 0 | DISCHARGE
Start: 2024-05-14

## 2024-05-14 RX ORDER — QUETIAPINE FUMARATE 200 MG/1
1 TABLET, FILM COATED ORAL
Qty: 0 | Refills: 0 | DISCHARGE
Start: 2024-05-14

## 2024-05-14 RX ORDER — LISINOPRIL 2.5 MG/1
1 TABLET ORAL
Refills: 0 | DISCHARGE

## 2024-05-14 RX ORDER — QUETIAPINE FUMARATE 200 MG/1
0.5 TABLET, FILM COATED ORAL
Refills: 0 | DISCHARGE

## 2024-05-14 RX ORDER — CLINDAMYCIN PHOSPHATE GEL USP, 1% 10 MG/G
1 GEL TOPICAL
Refills: 0 | DISCHARGE

## 2024-05-14 RX ORDER — SODIUM BICARBONATE 1 MEQ/ML
1 SYRINGE (ML) INTRAVENOUS
Qty: 0 | Refills: 0 | DISCHARGE
Start: 2024-05-14

## 2024-05-14 RX ORDER — SODIUM CHLORIDE 9 MG/ML
1000 INJECTION, SOLUTION INTRAVENOUS
Refills: 0 | Status: DISCONTINUED | OUTPATIENT
Start: 2024-05-14 | End: 2024-05-14

## 2024-05-14 RX ORDER — ERYTHROMYCIN BASE 5 MG/GRAM
1 OINTMENT (GRAM) OPHTHALMIC (EYE)
Qty: 0 | Refills: 0 | DISCHARGE
Start: 2024-05-14

## 2024-05-14 RX ORDER — CHOLECALCIFEROL (VITAMIN D3) 125 MCG
1000 CAPSULE ORAL
Qty: 0 | Refills: 0 | DISCHARGE
Start: 2024-05-14

## 2024-05-14 RX ADMIN — DONEPEZIL HYDROCHLORIDE 5 MILLIGRAM(S): 10 TABLET, FILM COATED ORAL at 09:37

## 2024-05-14 RX ADMIN — SERTRALINE 100 MILLIGRAM(S): 25 TABLET, FILM COATED ORAL at 09:37

## 2024-05-14 RX ADMIN — QUETIAPINE FUMARATE 12.5 MILLIGRAM(S): 200 TABLET, FILM COATED ORAL at 13:22

## 2024-05-14 RX ADMIN — Medication 81 MILLIGRAM(S): at 09:37

## 2024-05-14 RX ADMIN — Medication 1 APPLICATION(S): at 09:49

## 2024-05-14 RX ADMIN — Medication 1 APPLICATION(S): at 00:12

## 2024-05-14 RX ADMIN — QUETIAPINE FUMARATE 12.5 MILLIGRAM(S): 200 TABLET, FILM COATED ORAL at 09:38

## 2024-05-14 RX ADMIN — HEPARIN SODIUM 5000 UNIT(S): 5000 INJECTION INTRAVENOUS; SUBCUTANEOUS at 06:06

## 2024-05-14 RX ADMIN — POLYETHYLENE GLYCOL 3350 17 GRAM(S): 17 POWDER, FOR SOLUTION ORAL at 09:38

## 2024-05-14 RX ADMIN — Medication 1000 UNIT(S): at 09:38

## 2024-05-14 NOTE — DISCHARGE NOTE PROVIDER - NSDCCPCAREPLAN_GEN_ALL_CORE_FT
PRINCIPAL DISCHARGE DIAGNOSIS  Diagnosis: Adult failure to thrive  Assessment and Plan of Treatment:       SECONDARY DISCHARGE DIAGNOSES  Diagnosis: Dementia  Assessment and Plan of Treatment:     Diagnosis: Bacterial conjunctivitis  Assessment and Plan of Treatment: cont erythromycin ointment through 5/18

## 2024-05-14 NOTE — DISCHARGE NOTE PROVIDER - ATTENDING DISCHARGE PHYSICAL EXAMINATION:
VITALS:   T(C): 36.6 (05-14-24 @ 08:55), Max: 36.9 (05-13-24 @ 21:00)  HR: 69 (05-14-24 @ 08:55) (69 - 86)  BP: 138/85 (05-14-24 @ 08:55) (122/73 - 146/83)  RR: 18 (05-14-24 @ 08:55) (16 - 18)  SpO2: 95% (05-14-24 @ 08:55) (94% - 98%)    GENERAL: NAD, lying in bed comfortably  HEAD:  Atraumatic, Normocephalic  EYES: EOMI, PERRLA, conjunctiva and sclera clear  ENT: Moist mucous membranes  NECK: Supple, No JVD  CHEST/LUNG: Clear to auscultation bilaterally; No rales, rhonchi, wheezing, or rubs. Unlabored respirations  HEART: Regular rate and rhythm; No murmurs, rubs, or gallops  ABDOMEN: BSx4; Soft, nontender, nondistended  EXTREMITIES:  2+ Peripheral Pulses, brisk capillary refill. No clubbing, cyanosis, or edema  NERVOUS SYSTEM:  A&Ox3, no focal deficits   SKIN: No rashes or lesions  PSYCH: Normal affect, euthymic mood

## 2024-05-14 NOTE — DISCHARGE NOTE NURSING/CASE MANAGEMENT/SOCIAL WORK - PATIENT PORTAL LINK FT
You can access the FollowMyHealth Patient Portal offered by [facility] by registering at the following website: [email or web address]. By joining [employer]’s FollowMyHealth portal, you will also be able to view your health information using other applications (apps) compatible with our system.

## 2024-05-14 NOTE — DISCHARGE NOTE PROVIDER - HOSPITAL COURSE
73M w/ PMH of CKD3, HLD, HTN, fecal/urinary incontinence), Falls, Hx of Dementia,,hx recurrent fall with unsteady gait, presenting to the ER with progressive decline mental status with worsening functional status. Pt was seen by psychiatry, increased zoloft Seroquel doses. Family having a more difficult time taking care of him.    EMANUEL on CKD stage 3b  Likely combination of poor oral intake and urinary obstruction  Hollins Placed  S/p IVF  Nephro followed.   Creat now at baseline     Metabolic acidosis    Na bicarbonate 500mg bid     Progressive decline cognitive function likely from progression of Dementia  Aricept Seroquel, Sertraline     Recurrent fall  -CTH,C-SPINE no fracture     HLP  -Statin     HTN   -Hold Lisinopril    Plan for MELANIE

## 2024-05-14 NOTE — DISCHARGE NOTE NURSING/CASE MANAGEMENT/SOCIAL WORK - NSDCPEFALRISK_GEN_ALL_CORE
For information on Fall & Injury Prevention, visit: [email or web address] OR  [email or web address] OR  https://www.cdc.gov/steadi/patient.html

## 2024-05-14 NOTE — DISCHARGE NOTE PROVIDER - NSDCMRMEDTOKEN_GEN_ALL_CORE_FT
Aricept 5 mg oral tablet: 1 tab(s) orally 2 times a day  aspirin 81 mg oral tablet: 1 tab(s) orally once a day  cholecalciferol oral tablet: 1000 unit(s) orally once a day  erythromycin 0.5% ophthalmic ointment: 1 application to each affected eye 4 times a day  melatonin 3 mg oral tablet: 1 tab(s) orally once a day (at bedtime) As needed Insomnia  polyethylene glycol 3350 oral powder for reconstitution: 17 gram(s) orally once a day  QUEtiapine 25 mg oral tablet: 1 tab(s) orally once a day (at bedtime)  senna leaf extract oral tablet: 2 tab(s) orally once a day (at bedtime)  sertraline 50 mg oral tablet: 2 tab(s) orally once a day  sodium bicarbonate 650 mg oral tablet: 1 tab(s) orally 2 times a day  tamsulosin 0.4 mg oral capsule: 2 cap(s) orally once a day (at bedtime)  Zetia 10 mg oral tablet: 1 tab(s) orally once a day (at bedtime)

## 2024-05-14 NOTE — DISCHARGE NOTE PROVIDER - NSDCFUSCHEDAPPT_GEN_ALL_CORE_FT
[facility] Physician [facility]  GERIATRICS [address]   Scheduled Appointment: 05/28/2024    John Gloria  [facility]  UROLOGY [address] [facility]  Scheduled Appointment: 08/08/2024

## 2024-05-15 ENCOUNTER — NON-APPOINTMENT (OUTPATIENT)
Age: 74
End: 2024-05-15

## 2024-05-24 ENCOUNTER — NON-APPOINTMENT (OUTPATIENT)
Age: 74
End: 2024-05-24

## 2024-05-28 ENCOUNTER — APPOINTMENT (OUTPATIENT)
Dept: GERIATRICS | Facility: CLINIC | Age: 74
End: 2024-05-28
Payer: MEDICARE

## 2024-05-28 PROCEDURE — ZZZZZ: CPT

## 2024-05-30 ENCOUNTER — RX RENEWAL (OUTPATIENT)
Age: 74
End: 2024-05-30

## 2024-05-30 RX ORDER — SERTRALINE HYDROCHLORIDE 100 MG/1
100 TABLET, FILM COATED ORAL DAILY
Qty: 30 | Refills: 4 | Status: ACTIVE | COMMUNITY
Start: 2022-01-11 | End: 1900-01-01

## 2024-06-02 NOTE — H&P ADULT - NEGATIVE GENERAL GENITOURINARY SYMPTOMS
No care due was identified.  [facility] Embedded Care Due Messages. Reference number: [id number].   6/02/2024 12:15:04 PM CDT   no flank pain L/no flank pain R

## 2024-06-10 ENCOUNTER — RX RENEWAL (OUTPATIENT)
Age: 74
End: 2024-06-10

## 2024-06-10 RX ORDER — LISINOPRIL 40 MG/1
40 TABLET ORAL
Qty: 90 | Refills: 1 | Status: ACTIVE | COMMUNITY
Start: 2022-12-28 | End: 1900-01-01

## 2024-06-10 RX ORDER — ATORVASTATIN CALCIUM 10 MG/1
10 TABLET, FILM COATED ORAL
Qty: 90 | Refills: 1 | Status: ACTIVE | COMMUNITY
Start: 2019-09-06 | End: 1900-01-01

## 2024-06-17 ENCOUNTER — NON-APPOINTMENT (OUTPATIENT)
Age: 74
End: 2024-06-17

## 2024-06-27 ENCOUNTER — NON-APPOINTMENT (OUTPATIENT)
Age: 74
End: 2024-06-27

## 2024-08-06 NOTE — ADDENDUM
[FreeTextEntry1] : Blood work is good with PSA at 3.7 with previously noted March 2019 via urology at 3.2.\par Results faxed to urology and patient to discuss with him. Initiate Treatment: mirtazapine 7.5 mg tablet \\nTake one tablet po nightly. Detail Level: Zone Render In Strict Bullet Format?: No Initiate Treatment: minocycline 100 mg capsule \\nTake one capsule PO BID with food.

## 2024-08-08 ENCOUNTER — APPOINTMENT (OUTPATIENT)
Dept: UROLOGY | Facility: CLINIC | Age: 74
End: 2024-08-08

## 2024-09-10 ENCOUNTER — NON-APPOINTMENT (OUTPATIENT)
Age: 74
End: 2024-09-10

## 2024-10-07 ENCOUNTER — NON-APPOINTMENT (OUTPATIENT)
Age: 74
End: 2024-10-07

## 2024-10-18 ENCOUNTER — TRANSCRIPTION ENCOUNTER (OUTPATIENT)
Age: 74
End: 2024-10-18

## 2024-10-19 ENCOUNTER — TRANSCRIPTION ENCOUNTER (OUTPATIENT)
Age: 74
End: 2024-10-19

## 2024-10-21 ENCOUNTER — NON-APPOINTMENT (OUTPATIENT)
Age: 74
End: 2024-10-21

## 2024-11-01 ENCOUNTER — NON-APPOINTMENT (OUTPATIENT)
Age: 74
End: 2024-11-01

## 2024-11-07 ENCOUNTER — APPOINTMENT (OUTPATIENT)
Dept: NEUROLOGY | Facility: CLINIC | Age: 74
End: 2024-11-07

## 2024-11-26 PROCEDURE — 80048 BASIC METABOLIC PNL TOTAL CA: CPT

## 2024-11-26 PROCEDURE — 96374 THER/PROPH/DIAG INJ IV PUSH: CPT

## 2024-11-26 PROCEDURE — 70552 MRI BRAIN STEM W/DYE: CPT | Mod: MC

## 2024-11-26 PROCEDURE — 85730 THROMBOPLASTIN TIME PARTIAL: CPT

## 2024-11-26 PROCEDURE — 70450 CT HEAD/BRAIN W/O DYE: CPT | Mod: MC

## 2024-11-26 PROCEDURE — 0225U NFCT DS DNA&RNA 21 SARSCOV2: CPT

## 2024-11-26 PROCEDURE — 74177 CT ABD & PELVIS W/CONTRAST: CPT | Mod: MC

## 2024-11-26 PROCEDURE — 93005 ELECTROCARDIOGRAM TRACING: CPT

## 2024-11-26 PROCEDURE — 84443 ASSAY THYROID STIM HORMONE: CPT

## 2024-11-26 PROCEDURE — 76770 US EXAM ABDO BACK WALL COMP: CPT

## 2024-11-26 PROCEDURE — 85610 PROTHROMBIN TIME: CPT

## 2024-11-26 PROCEDURE — 82607 VITAMIN B-12: CPT

## 2024-11-26 PROCEDURE — 96375 TX/PRO/DX INJ NEW DRUG ADDON: CPT

## 2024-11-26 PROCEDURE — 87637 SARSCOV2&INF A&B&RSV AMP PRB: CPT

## 2024-11-26 PROCEDURE — 76857 US EXAM PELVIC LIMITED: CPT

## 2024-11-26 PROCEDURE — 80074 ACUTE HEPATITIS PANEL: CPT

## 2024-11-26 PROCEDURE — 99285 EMERGENCY DEPT VISIT HI MDM: CPT

## 2024-11-26 PROCEDURE — 87150 DNA/RNA AMPLIFIED PROBE: CPT

## 2024-11-26 PROCEDURE — 83605 ASSAY OF LACTIC ACID: CPT

## 2024-11-26 PROCEDURE — 81001 URINALYSIS AUTO W/SCOPE: CPT

## 2024-11-26 PROCEDURE — 82140 ASSAY OF AMMONIA: CPT

## 2024-11-26 PROCEDURE — 36415 COLL VENOUS BLD VENIPUNCTURE: CPT

## 2024-11-26 PROCEDURE — 0241U: CPT

## 2024-11-26 PROCEDURE — 85025 COMPLETE CBC W/AUTO DIFF WBC: CPT

## 2024-11-26 PROCEDURE — 87086 URINE CULTURE/COLONY COUNT: CPT

## 2024-11-26 PROCEDURE — G0103: CPT

## 2024-11-26 PROCEDURE — 83735 ASSAY OF MAGNESIUM: CPT

## 2024-11-26 PROCEDURE — 84100 ASSAY OF PHOSPHORUS: CPT

## 2024-11-26 PROCEDURE — 83036 HEMOGLOBIN GLYCOSYLATED A1C: CPT

## 2024-11-26 PROCEDURE — 87641 MR-STAPH DNA AMP PROBE: CPT

## 2024-11-26 PROCEDURE — 80061 LIPID PANEL: CPT

## 2024-11-26 PROCEDURE — 80053 COMPREHEN METABOLIC PANEL: CPT

## 2024-11-26 PROCEDURE — 87640 STAPH A DNA AMP PROBE: CPT

## 2024-11-26 PROCEDURE — 82962 GLUCOSE BLOOD TEST: CPT

## 2024-11-26 PROCEDURE — 71045 X-RAY EXAM CHEST 1 VIEW: CPT

## 2024-11-26 PROCEDURE — 87077 CULTURE AEROBIC IDENTIFY: CPT

## 2024-11-26 PROCEDURE — 71260 CT THORAX DX C+: CPT | Mod: MC

## 2024-11-26 PROCEDURE — 84145 PROCALCITONIN (PCT): CPT

## 2024-11-26 PROCEDURE — 72125 CT NECK SPINE W/O DYE: CPT | Mod: MC

## 2024-11-26 PROCEDURE — 87040 BLOOD CULTURE FOR BACTERIA: CPT

## 2025-04-10 NOTE — PROGRESS NOTE ADULT - SUBJECTIVE AND OBJECTIVE BOX
NEPHROLOGY INTERVAL HPI/OVERNIGHT EVENTS:  pt clinically stable  no acute distress noted    MEDICATIONS  (STANDING):  cyanocobalamin 1000 MICROGram(s) Oral daily  donepezil 5 milliGRAM(s) Oral <User Schedule>  ergocalciferol 59218 Unit(s) Oral every week  heparin   Injectable 5000 Unit(s) SubCutaneous every 12 hours  memantine 10 milliGRAM(s) Oral two times a day  QUEtiapine 12.5 milliGRAM(s) Oral at bedtime  sertraline 75 milliGRAM(s) Oral daily  sodium chloride 0.9% lock flush 3 milliLiter(s) IV Push every 8 hours  tamsulosin 0.8 milliGRAM(s) Oral at bedtime    MEDICATIONS  (PRN):  acetaminophen     Tablet .. 650 milliGRAM(s) Oral every 6 hours PRN Temp greater or equal to 38C (100.4F), Mild Pain (1 - 3)  aluminum hydroxide/magnesium hydroxide/simethicone Suspension 30 milliLiter(s) Oral every 4 hours PRN Dyspepsia  benzonatate 100 milliGRAM(s) Oral three times a day PRN Cough  melatonin 3 milliGRAM(s) Oral at bedtime PRN Insomnia  ondansetron Injectable 4 milliGRAM(s) IV Push every 8 hours PRN Nausea and/or Vomiting      Allergies    No Known Allergies          Vital Signs Last 24 Hrs  T(C): 37.3 (03 Dec 2023 08:40), Max: 37.4 (02 Dec 2023 20:38)  T(F): 99.1 (03 Dec 2023 08:40), Max: 99.3 (02 Dec 2023 20:38)  HR: 74 (03 Dec 2023 08:40) (65 - 93)  BP: 112/71 (03 Dec 2023 08:40) (112/71 - 130/75)  BP(mean): --  RR: 17 (03 Dec 2023 08:40) (17 - 18)  SpO2: 92% (03 Dec 2023 08:40) (92% - 96%)    Parameters below as of 03 Dec 2023 08:40  Patient On (Oxygen Delivery Method): room air        PHYSICAL EXAM:  GENERAL: NAD  HEENT: No periorbital edema  NECK: Supple, No JVD  NERVOUS SYSTEM:  Awake, alert; demented  CHEST/LUNG: Clear bilaterally  HEART: Regular rate and rhythm; No rub  ABDOMEN: Soft, Nontender,+BS  EXTREMITIES:  2+ Peripheral Pulses, No dependent edema  SKIN: No rashes or lesions  : Hollins    LABS:    12-03    142  |  110<H>  |  18.8  ----------------------------<  88  4.2   |  21.0<L>  |  1.25      Ca    7.9<L>      03 Dec 2023 06:17        Urinalysis Basic - ( 03 Dec 2023 06:17 )    Color: x / Appearance: x / SG: x / pH: x  Gluc: 88 mg/dL / Ketone: x  / Bili: x / Urobili: x   Blood: x / Protein: x / Nitrite: x   Leuk Esterase: x / RBC: x / WBC x   Sq Epi: x / Non Sq Epi: x / Bacteria: x          RADIOLOGY & ADDITIONAL TESTS:   NEPHROLOGY INTERVAL HPI/OVERNIGHT EVENTS:  pt clinically stable  no acute distress noted    MEDICATIONS  (STANDING):  cyanocobalamin 1000 MICROGram(s) Oral daily  donepezil 5 milliGRAM(s) Oral <User Schedule>  ergocalciferol 44852 Unit(s) Oral every week  heparin   Injectable 5000 Unit(s) SubCutaneous every 12 hours  memantine 10 milliGRAM(s) Oral two times a day  QUEtiapine 12.5 milliGRAM(s) Oral at bedtime  sertraline 75 milliGRAM(s) Oral daily  sodium chloride 0.9% lock flush 3 milliLiter(s) IV Push every 8 hours  tamsulosin 0.8 milliGRAM(s) Oral at bedtime    MEDICATIONS  (PRN):  acetaminophen     Tablet .. 650 milliGRAM(s) Oral every 6 hours PRN Temp greater or equal to 38C (100.4F), Mild Pain (1 - 3)  aluminum hydroxide/magnesium hydroxide/simethicone Suspension 30 milliLiter(s) Oral every 4 hours PRN Dyspepsia  benzonatate 100 milliGRAM(s) Oral three times a day PRN Cough  melatonin 3 milliGRAM(s) Oral at bedtime PRN Insomnia  ondansetron Injectable 4 milliGRAM(s) IV Push every 8 hours PRN Nausea and/or Vomiting      Allergies    No Known Allergies          Vital Signs Last 24 Hrs  T(C): 37.3 (03 Dec 2023 08:40), Max: 37.4 (02 Dec 2023 20:38)  T(F): 99.1 (03 Dec 2023 08:40), Max: 99.3 (02 Dec 2023 20:38)  HR: 74 (03 Dec 2023 08:40) (65 - 93)  BP: 112/71 (03 Dec 2023 08:40) (112/71 - 130/75)  BP(mean): --  RR: 17 (03 Dec 2023 08:40) (17 - 18)  SpO2: 92% (03 Dec 2023 08:40) (92% - 96%)    Parameters below as of 03 Dec 2023 08:40  Patient On (Oxygen Delivery Method): room air        PHYSICAL EXAM:  GENERAL: NAD  HEENT: No periorbital edema  NECK: Supple, No JVD  NERVOUS SYSTEM:  Awake, alert; demented  CHEST/LUNG: Clear bilaterally  HEART: Regular rate and rhythm; No rub  ABDOMEN: Soft, Nontender,+BS  EXTREMITIES:  2+ Peripheral Pulses, No dependent edema  SKIN: No rashes or lesions  : Hollins    LABS:    12-03    142  |  110<H>  |  18.8  ----------------------------<  88  4.2   |  21.0<L>  |  1.25      Ca    7.9<L>      03 Dec 2023 06:17        Urinalysis Basic - ( 03 Dec 2023 06:17 )    Color: x / Appearance: x / SG: x / pH: x  Gluc: 88 mg/dL / Ketone: x  / Bili: x / Urobili: x   Blood: x / Protein: x / Nitrite: x   Leuk Esterase: x / RBC: x / WBC x   Sq Epi: x / Non Sq Epi: x / Bacteria: x          RADIOLOGY & ADDITIONAL TESTS:   NEPHROLOGY INTERVAL HPI/OVERNIGHT EVENTS:  pt clinically stable  no acute distress noted    MEDICATIONS  (STANDING):  cyanocobalamin 1000 MICROGram(s) Oral daily  donepezil 5 milliGRAM(s) Oral <User Schedule>  ergocalciferol 51054 Unit(s) Oral every week  heparin   Injectable 5000 Unit(s) SubCutaneous every 12 hours  memantine 10 milliGRAM(s) Oral two times a day  QUEtiapine 12.5 milliGRAM(s) Oral at bedtime  sertraline 75 milliGRAM(s) Oral daily  sodium chloride 0.9% lock flush 3 milliLiter(s) IV Push every 8 hours  tamsulosin 0.8 milliGRAM(s) Oral at bedtime    MEDICATIONS  (PRN):  acetaminophen     Tablet .. 650 milliGRAM(s) Oral every 6 hours PRN Temp greater or equal to 38C (100.4F), Mild Pain (1 - 3)  aluminum hydroxide/magnesium hydroxide/simethicone Suspension 30 milliLiter(s) Oral every 4 hours PRN Dyspepsia  benzonatate 100 milliGRAM(s) Oral three times a day PRN Cough  melatonin 3 milliGRAM(s) Oral at bedtime PRN Insomnia  ondansetron Injectable 4 milliGRAM(s) IV Push every 8 hours PRN Nausea and/or Vomiting      Allergies    No Known Allergies          Vital Signs Last 24 Hrs  T(C): 37.3 (03 Dec 2023 08:40), Max: 37.4 (02 Dec 2023 20:38)  T(F): 99.1 (03 Dec 2023 08:40), Max: 99.3 (02 Dec 2023 20:38)  HR: 74 (03 Dec 2023 08:40) (65 - 93)  BP: 112/71 (03 Dec 2023 08:40) (112/71 - 130/75)  BP(mean): --  RR: 17 (03 Dec 2023 08:40) (17 - 18)  SpO2: 92% (03 Dec 2023 08:40) (92% - 96%)    Parameters below as of 03 Dec 2023 08:40  Patient On (Oxygen Delivery Method): room air        PHYSICAL EXAM:  GENERAL: NAD  HEENT: No periorbital edema  NECK: Supple, No JVD  NERVOUS SYSTEM:  Awake, alert; demented  CHEST/LUNG: Clear bilaterally  HEART: Regular rate and rhythm; No rub  ABDOMEN: Soft, Nontender,+BS  EXTREMITIES:  2+ Peripheral Pulses, No dependent edema  SKIN: No rashes or lesions  : Hollins    LABS:    12-03    142  |  110<H>  |  18.8  ----------------------------<  88  4.2   |  21.0<L>  |  1.25      Ca    7.9<L>      03 Dec 2023 06:17        Urinalysis Basic - ( 03 Dec 2023 06:17 )    Color: x / Appearance: x / SG: x / pH: x  Gluc: 88 mg/dL / Ketone: x  / Bili: x / Urobili: x   Blood: x / Protein: x / Nitrite: x   Leuk Esterase: x / RBC: x / WBC x   Sq Epi: x / Non Sq Epi: x / Bacteria: x          RADIOLOGY & ADDITIONAL TESTS:   Yes...

## 2025-04-29 ENCOUNTER — APPOINTMENT (OUTPATIENT)
Dept: DERMATOLOGY | Facility: CLINIC | Age: 75
End: 2025-04-29